# Patient Record
Sex: MALE | Race: WHITE | NOT HISPANIC OR LATINO | Employment: FULL TIME | URBAN - METROPOLITAN AREA
[De-identification: names, ages, dates, MRNs, and addresses within clinical notes are randomized per-mention and may not be internally consistent; named-entity substitution may affect disease eponyms.]

---

## 2017-01-17 ENCOUNTER — COMMUNICATION - HEALTHEAST (OUTPATIENT)
Dept: SCHEDULING | Facility: CLINIC | Age: 32
End: 2017-01-17

## 2017-02-16 ENCOUNTER — COMMUNICATION - HEALTHEAST (OUTPATIENT)
Dept: FAMILY MEDICINE | Facility: CLINIC | Age: 32
End: 2017-02-16

## 2017-02-22 ENCOUNTER — COMMUNICATION - HEALTHEAST (OUTPATIENT)
Dept: SCHEDULING | Facility: CLINIC | Age: 32
End: 2017-02-22

## 2017-03-27 ENCOUNTER — COMMUNICATION - HEALTHEAST (OUTPATIENT)
Dept: SCHEDULING | Facility: CLINIC | Age: 32
End: 2017-03-27

## 2017-04-04 ENCOUNTER — OFFICE VISIT - HEALTHEAST (OUTPATIENT)
Dept: FAMILY MEDICINE | Facility: CLINIC | Age: 32
End: 2017-04-04

## 2017-04-04 DIAGNOSIS — F90.9 ADHD (ATTENTION DEFICIT HYPERACTIVITY DISORDER): ICD-10-CM

## 2017-04-04 DIAGNOSIS — F41.9 ANXIETY: ICD-10-CM

## 2017-04-04 DIAGNOSIS — F90.9 ATTENTION DEFICIT HYPERACTIVITY DISORDER (ADHD), UNSPECIFIED ADHD TYPE: ICD-10-CM

## 2017-04-04 ASSESSMENT — MIFFLIN-ST. JEOR: SCORE: 1859.53

## 2017-07-07 ENCOUNTER — COMMUNICATION - HEALTHEAST (OUTPATIENT)
Dept: FAMILY MEDICINE | Facility: CLINIC | Age: 32
End: 2017-07-07

## 2017-07-07 DIAGNOSIS — F41.9 ANXIETY: ICD-10-CM

## 2017-10-05 ENCOUNTER — COMMUNICATION - HEALTHEAST (OUTPATIENT)
Dept: FAMILY MEDICINE | Facility: CLINIC | Age: 32
End: 2017-10-05

## 2017-10-05 DIAGNOSIS — F41.9 ANXIETY: ICD-10-CM

## 2017-10-26 ENCOUNTER — OFFICE VISIT - HEALTHEAST (OUTPATIENT)
Dept: FAMILY MEDICINE | Facility: CLINIC | Age: 32
End: 2017-10-26

## 2017-10-26 DIAGNOSIS — Z23 NEED FOR DIPHTHERIA-TETANUS-PERTUSSIS (TDAP) VACCINE, ADULT/ADOLESCENT: ICD-10-CM

## 2017-10-26 DIAGNOSIS — Z23 NEED FOR IMMUNIZATION AGAINST INFLUENZA: ICD-10-CM

## 2017-10-26 DIAGNOSIS — H53.9 VISUAL CHANGES: ICD-10-CM

## 2017-10-26 DIAGNOSIS — F90.9 ATTENTION DEFICIT HYPERACTIVITY DISORDER (ADHD), UNSPECIFIED ADHD TYPE: ICD-10-CM

## 2017-10-26 DIAGNOSIS — Z00.00 HEALTH CARE MAINTENANCE: ICD-10-CM

## 2017-10-26 DIAGNOSIS — F41.9 ANXIETY: ICD-10-CM

## 2017-10-26 LAB
CHOLEST SERPL-MCNC: 283 MG/DL
FASTING STATUS PATIENT QL REPORTED: NO
HDLC SERPL-MCNC: 60 MG/DL
LDLC SERPL CALC-MCNC: 206 MG/DL
TRIGL SERPL-MCNC: 86 MG/DL

## 2017-10-31 ENCOUNTER — COMMUNICATION - HEALTHEAST (OUTPATIENT)
Dept: FAMILY MEDICINE | Facility: CLINIC | Age: 32
End: 2017-10-31

## 2017-11-21 ENCOUNTER — COMMUNICATION - HEALTHEAST (OUTPATIENT)
Dept: FAMILY MEDICINE | Facility: CLINIC | Age: 32
End: 2017-11-21

## 2017-11-21 DIAGNOSIS — F90.9 ATTENTION DEFICIT HYPERACTIVITY DISORDER (ADHD), UNSPECIFIED ADHD TYPE: ICD-10-CM

## 2017-12-24 ENCOUNTER — COMMUNICATION - HEALTHEAST (OUTPATIENT)
Dept: SCHEDULING | Facility: CLINIC | Age: 32
End: 2017-12-24

## 2017-12-24 DIAGNOSIS — F90.9 ATTENTION DEFICIT HYPERACTIVITY DISORDER (ADHD), UNSPECIFIED ADHD TYPE: ICD-10-CM

## 2018-01-24 ENCOUNTER — COMMUNICATION - HEALTHEAST (OUTPATIENT)
Dept: FAMILY MEDICINE | Facility: CLINIC | Age: 33
End: 2018-01-24

## 2018-01-24 DIAGNOSIS — F90.9 ATTENTION DEFICIT HYPERACTIVITY DISORDER (ADHD), UNSPECIFIED ADHD TYPE: ICD-10-CM

## 2018-02-27 ENCOUNTER — COMMUNICATION - HEALTHEAST (OUTPATIENT)
Dept: SCHEDULING | Facility: CLINIC | Age: 33
End: 2018-02-27

## 2018-02-27 DIAGNOSIS — F90.9 ATTENTION DEFICIT HYPERACTIVITY DISORDER (ADHD), UNSPECIFIED ADHD TYPE: ICD-10-CM

## 2018-03-26 ENCOUNTER — COMMUNICATION - HEALTHEAST (OUTPATIENT)
Dept: FAMILY MEDICINE | Facility: CLINIC | Age: 33
End: 2018-03-26

## 2018-03-26 DIAGNOSIS — F90.9 ATTENTION DEFICIT HYPERACTIVITY DISORDER (ADHD), UNSPECIFIED ADHD TYPE: ICD-10-CM

## 2018-04-26 ENCOUNTER — COMMUNICATION - HEALTHEAST (OUTPATIENT)
Dept: FAMILY MEDICINE | Facility: CLINIC | Age: 33
End: 2018-04-26

## 2018-04-26 DIAGNOSIS — F90.9 ATTENTION DEFICIT HYPERACTIVITY DISORDER (ADHD), UNSPECIFIED ADHD TYPE: ICD-10-CM

## 2018-04-27 ENCOUNTER — OFFICE VISIT - HEALTHEAST (OUTPATIENT)
Dept: FAMILY MEDICINE | Facility: CLINIC | Age: 33
End: 2018-04-27

## 2018-04-27 DIAGNOSIS — F90.0 ATTENTION DEFICIT HYPERACTIVITY DISORDER (ADHD), PREDOMINANTLY INATTENTIVE TYPE: ICD-10-CM

## 2018-04-27 DIAGNOSIS — Z00.00 HEALTHCARE MAINTENANCE: ICD-10-CM

## 2018-04-27 DIAGNOSIS — E55.9 VITAMIN D DEFICIENCY: ICD-10-CM

## 2018-04-27 DIAGNOSIS — E78.01 FAMILIAL HYPERCHOLESTEROLEMIA: ICD-10-CM

## 2018-04-27 LAB
ALBUMIN SERPL-MCNC: 4.5 G/DL (ref 3.5–5)
ALP SERPL-CCNC: 69 U/L (ref 45–120)
ALT SERPL W P-5'-P-CCNC: 20 U/L (ref 0–45)
ANION GAP SERPL CALCULATED.3IONS-SCNC: 15 MMOL/L (ref 5–18)
AST SERPL W P-5'-P-CCNC: 20 U/L (ref 0–40)
BILIRUB SERPL-MCNC: 0.8 MG/DL (ref 0–1)
BUN SERPL-MCNC: 13 MG/DL (ref 8–22)
CALCIUM SERPL-MCNC: 9.8 MG/DL (ref 8.5–10.5)
CHLORIDE BLD-SCNC: 101 MMOL/L (ref 98–107)
CHOLEST SERPL-MCNC: 255 MG/DL
CO2 SERPL-SCNC: 24 MMOL/L (ref 22–31)
CREAT SERPL-MCNC: 1.05 MG/DL (ref 0.7–1.3)
FASTING STATUS PATIENT QL REPORTED: NO
GFR SERPL CREATININE-BSD FRML MDRD: >60 ML/MIN/1.73M2
GLUCOSE BLD-MCNC: 81 MG/DL (ref 70–125)
HDLC SERPL-MCNC: 54 MG/DL
LDLC SERPL CALC-MCNC: 182 MG/DL
POTASSIUM BLD-SCNC: 4.1 MMOL/L (ref 3.5–5)
PROT SERPL-MCNC: 7.8 G/DL (ref 6–8)
SODIUM SERPL-SCNC: 140 MMOL/L (ref 136–145)
TRIGL SERPL-MCNC: 94 MG/DL

## 2018-04-27 ASSESSMENT — MIFFLIN-ST. JEOR: SCORE: 1832.31

## 2018-04-30 ENCOUNTER — COMMUNICATION - HEALTHEAST (OUTPATIENT)
Dept: FAMILY MEDICINE | Facility: CLINIC | Age: 33
End: 2018-04-30

## 2018-04-30 LAB — 25(OH)D3 SERPL-MCNC: 36.4 NG/ML (ref 30–80)

## 2018-05-29 ENCOUNTER — COMMUNICATION - HEALTHEAST (OUTPATIENT)
Dept: FAMILY MEDICINE | Facility: CLINIC | Age: 33
End: 2018-05-29

## 2018-05-29 DIAGNOSIS — F90.9 ATTENTION DEFICIT HYPERACTIVITY DISORDER (ADHD), UNSPECIFIED ADHD TYPE: ICD-10-CM

## 2018-07-30 ENCOUNTER — COMMUNICATION - HEALTHEAST (OUTPATIENT)
Dept: SCHEDULING | Facility: CLINIC | Age: 33
End: 2018-07-30

## 2018-07-30 DIAGNOSIS — F90.9 ATTENTION DEFICIT HYPERACTIVITY DISORDER (ADHD), UNSPECIFIED ADHD TYPE: ICD-10-CM

## 2018-08-30 ENCOUNTER — COMMUNICATION - HEALTHEAST (OUTPATIENT)
Dept: FAMILY MEDICINE | Facility: CLINIC | Age: 33
End: 2018-08-30

## 2018-08-30 DIAGNOSIS — F90.9 ATTENTION DEFICIT HYPERACTIVITY DISORDER (ADHD), UNSPECIFIED ADHD TYPE: ICD-10-CM

## 2018-10-01 ENCOUNTER — COMMUNICATION - HEALTHEAST (OUTPATIENT)
Dept: FAMILY MEDICINE | Facility: CLINIC | Age: 33
End: 2018-10-01

## 2018-10-01 DIAGNOSIS — F90.9 ATTENTION DEFICIT HYPERACTIVITY DISORDER (ADHD), UNSPECIFIED ADHD TYPE: ICD-10-CM

## 2018-10-31 ENCOUNTER — COMMUNICATION - HEALTHEAST (OUTPATIENT)
Dept: FAMILY MEDICINE | Facility: CLINIC | Age: 33
End: 2018-10-31

## 2018-10-31 DIAGNOSIS — F90.9 ATTENTION DEFICIT HYPERACTIVITY DISORDER (ADHD), UNSPECIFIED ADHD TYPE: ICD-10-CM

## 2018-11-20 ENCOUNTER — OFFICE VISIT - HEALTHEAST (OUTPATIENT)
Dept: FAMILY MEDICINE | Facility: CLINIC | Age: 33
End: 2018-11-20

## 2018-11-20 ENCOUNTER — COMMUNICATION - HEALTHEAST (OUTPATIENT)
Dept: TELEHEALTH | Facility: CLINIC | Age: 33
End: 2018-11-20

## 2018-11-20 DIAGNOSIS — F90.0 ATTENTION DEFICIT HYPERACTIVITY DISORDER (ADHD), PREDOMINANTLY INATTENTIVE TYPE: ICD-10-CM

## 2018-11-20 DIAGNOSIS — F41.9 ANXIETY: ICD-10-CM

## 2018-11-20 ASSESSMENT — MIFFLIN-ST. JEOR: SCORE: 1809.63

## 2018-11-28 ENCOUNTER — COMMUNICATION - HEALTHEAST (OUTPATIENT)
Dept: FAMILY MEDICINE | Facility: CLINIC | Age: 33
End: 2018-11-28

## 2018-11-28 DIAGNOSIS — F90.9 ATTENTION DEFICIT HYPERACTIVITY DISORDER (ADHD), UNSPECIFIED ADHD TYPE: ICD-10-CM

## 2018-12-17 ENCOUNTER — COMMUNICATION - HEALTHEAST (OUTPATIENT)
Dept: FAMILY MEDICINE | Facility: CLINIC | Age: 33
End: 2018-12-17

## 2018-12-17 DIAGNOSIS — F41.9 ANXIETY: ICD-10-CM

## 2018-12-31 ENCOUNTER — COMMUNICATION - HEALTHEAST (OUTPATIENT)
Dept: FAMILY MEDICINE | Facility: CLINIC | Age: 33
End: 2018-12-31

## 2018-12-31 DIAGNOSIS — F90.9 ATTENTION DEFICIT HYPERACTIVITY DISORDER (ADHD), UNSPECIFIED ADHD TYPE: ICD-10-CM

## 2019-02-01 ENCOUNTER — COMMUNICATION - HEALTHEAST (OUTPATIENT)
Dept: FAMILY MEDICINE | Facility: CLINIC | Age: 34
End: 2019-02-01

## 2019-02-01 DIAGNOSIS — F90.9 ATTENTION DEFICIT HYPERACTIVITY DISORDER (ADHD), UNSPECIFIED ADHD TYPE: ICD-10-CM

## 2019-03-04 ENCOUNTER — COMMUNICATION - HEALTHEAST (OUTPATIENT)
Dept: FAMILY MEDICINE | Facility: CLINIC | Age: 34
End: 2019-03-04

## 2019-03-04 DIAGNOSIS — F90.9 ATTENTION DEFICIT HYPERACTIVITY DISORDER (ADHD), UNSPECIFIED ADHD TYPE: ICD-10-CM

## 2019-03-05 ENCOUNTER — COMMUNICATION - HEALTHEAST (OUTPATIENT)
Dept: FAMILY MEDICINE | Facility: CLINIC | Age: 34
End: 2019-03-05

## 2019-03-05 DIAGNOSIS — F90.9 ATTENTION DEFICIT HYPERACTIVITY DISORDER (ADHD), UNSPECIFIED ADHD TYPE: ICD-10-CM

## 2019-03-06 ENCOUNTER — COMMUNICATION - HEALTHEAST (OUTPATIENT)
Dept: FAMILY MEDICINE | Facility: CLINIC | Age: 34
End: 2019-03-06

## 2019-03-06 DIAGNOSIS — F90.9 ATTENTION DEFICIT HYPERACTIVITY DISORDER (ADHD), UNSPECIFIED ADHD TYPE: ICD-10-CM

## 2019-04-04 ENCOUNTER — COMMUNICATION - HEALTHEAST (OUTPATIENT)
Dept: FAMILY MEDICINE | Facility: CLINIC | Age: 34
End: 2019-04-04

## 2019-04-04 DIAGNOSIS — F90.9 ATTENTION DEFICIT HYPERACTIVITY DISORDER (ADHD), UNSPECIFIED ADHD TYPE: ICD-10-CM

## 2019-05-03 ENCOUNTER — COMMUNICATION - HEALTHEAST (OUTPATIENT)
Dept: FAMILY MEDICINE | Facility: CLINIC | Age: 34
End: 2019-05-03

## 2019-05-03 DIAGNOSIS — F90.9 ATTENTION DEFICIT HYPERACTIVITY DISORDER (ADHD), UNSPECIFIED ADHD TYPE: ICD-10-CM

## 2019-06-03 ENCOUNTER — COMMUNICATION - HEALTHEAST (OUTPATIENT)
Dept: FAMILY MEDICINE | Facility: CLINIC | Age: 34
End: 2019-06-03

## 2019-06-03 DIAGNOSIS — F90.9 ATTENTION DEFICIT HYPERACTIVITY DISORDER (ADHD), UNSPECIFIED ADHD TYPE: ICD-10-CM

## 2019-07-02 ENCOUNTER — COMMUNICATION - HEALTHEAST (OUTPATIENT)
Dept: FAMILY MEDICINE | Facility: CLINIC | Age: 34
End: 2019-07-02

## 2019-07-02 DIAGNOSIS — F90.9 ATTENTION DEFICIT HYPERACTIVITY DISORDER (ADHD), UNSPECIFIED ADHD TYPE: ICD-10-CM

## 2019-08-01 ENCOUNTER — COMMUNICATION - HEALTHEAST (OUTPATIENT)
Dept: FAMILY MEDICINE | Facility: CLINIC | Age: 34
End: 2019-08-01

## 2019-08-01 DIAGNOSIS — F90.9 ATTENTION DEFICIT HYPERACTIVITY DISORDER (ADHD), UNSPECIFIED ADHD TYPE: ICD-10-CM

## 2019-09-03 ENCOUNTER — COMMUNICATION - HEALTHEAST (OUTPATIENT)
Dept: FAMILY MEDICINE | Facility: CLINIC | Age: 34
End: 2019-09-03

## 2019-09-03 DIAGNOSIS — F90.9 ATTENTION DEFICIT HYPERACTIVITY DISORDER (ADHD), UNSPECIFIED ADHD TYPE: ICD-10-CM

## 2019-09-06 ENCOUNTER — RECORDS - HEALTHEAST (OUTPATIENT)
Dept: GENERAL RADIOLOGY | Facility: CLINIC | Age: 34
End: 2019-09-06

## 2019-09-06 ENCOUNTER — OFFICE VISIT - HEALTHEAST (OUTPATIENT)
Dept: FAMILY MEDICINE | Facility: CLINIC | Age: 34
End: 2019-09-06

## 2019-09-06 DIAGNOSIS — M54.50 CHRONIC MIDLINE LOW BACK PAIN WITHOUT SCIATICA: ICD-10-CM

## 2019-09-06 DIAGNOSIS — Z98.890 OTHER SPECIFIED POSTPROCEDURAL STATES: ICD-10-CM

## 2019-09-06 DIAGNOSIS — F90.0 ATTENTION DEFICIT HYPERACTIVITY DISORDER (ADHD), PREDOMINANTLY INATTENTIVE TYPE: ICD-10-CM

## 2019-09-06 DIAGNOSIS — G89.29 OTHER CHRONIC PAIN: ICD-10-CM

## 2019-09-06 DIAGNOSIS — M62.830 BACK MUSCLE SPASM: ICD-10-CM

## 2019-09-06 DIAGNOSIS — Z11.4 SCREENING FOR HIV (HUMAN IMMUNODEFICIENCY VIRUS): ICD-10-CM

## 2019-09-06 DIAGNOSIS — Z00.00 HEALTHCARE MAINTENANCE: ICD-10-CM

## 2019-09-06 DIAGNOSIS — E78.01 FAMILIAL HYPERCHOLESTEROLEMIA: ICD-10-CM

## 2019-09-06 DIAGNOSIS — F41.9 ANXIETY: ICD-10-CM

## 2019-09-06 DIAGNOSIS — G89.29 CHRONIC MIDLINE LOW BACK PAIN WITHOUT SCIATICA: ICD-10-CM

## 2019-09-06 DIAGNOSIS — M54.50 LOW BACK PAIN: ICD-10-CM

## 2019-09-06 DIAGNOSIS — Z98.890 HISTORY OF BACK SURGERY: ICD-10-CM

## 2019-09-06 LAB — HIV 1+2 AB+HIV1 P24 AG SERPL QL IA: NEGATIVE

## 2019-09-06 ASSESSMENT — MIFFLIN-ST. JEOR: SCORE: 1849.32

## 2019-09-07 LAB
ALBUMIN SERPL-MCNC: 4.6 G/DL (ref 3.5–5)
ALP SERPL-CCNC: 59 U/L (ref 45–120)
ALT SERPL W P-5'-P-CCNC: 22 U/L (ref 0–45)
ANION GAP SERPL CALCULATED.3IONS-SCNC: 13 MMOL/L (ref 5–18)
AST SERPL W P-5'-P-CCNC: 21 U/L (ref 0–40)
BILIRUB SERPL-MCNC: 0.6 MG/DL (ref 0–1)
BUN SERPL-MCNC: 16 MG/DL (ref 8–22)
CALCIUM SERPL-MCNC: 10.1 MG/DL (ref 8.5–10.5)
CHLORIDE BLD-SCNC: 100 MMOL/L (ref 98–107)
CHOLEST SERPL-MCNC: 264 MG/DL
CO2 SERPL-SCNC: 26 MMOL/L (ref 22–31)
CREAT SERPL-MCNC: 1.1 MG/DL (ref 0.7–1.3)
FASTING STATUS PATIENT QL REPORTED: NO
GFR SERPL CREATININE-BSD FRML MDRD: >60 ML/MIN/1.73M2
GLUCOSE BLD-MCNC: 80 MG/DL (ref 70–125)
HDLC SERPL-MCNC: 65 MG/DL
LDLC SERPL CALC-MCNC: 177 MG/DL
POTASSIUM BLD-SCNC: 4.1 MMOL/L (ref 3.5–5)
PROT SERPL-MCNC: 7.7 G/DL (ref 6–8)
SODIUM SERPL-SCNC: 139 MMOL/L (ref 136–145)
TRIGL SERPL-MCNC: 112 MG/DL

## 2019-09-17 ENCOUNTER — HOSPITAL ENCOUNTER (OUTPATIENT)
Dept: MRI IMAGING | Facility: CLINIC | Age: 34
Discharge: HOME | End: 2019-09-17
Attending: FAMILY MEDICINE

## 2019-09-17 DIAGNOSIS — M54.50 CHRONIC MIDLINE LOW BACK PAIN WITHOUT SCIATICA: ICD-10-CM

## 2019-09-17 DIAGNOSIS — M62.830 BACK MUSCLE SPASM: ICD-10-CM

## 2019-09-17 DIAGNOSIS — Z98.890 HISTORY OF BACK SURGERY: ICD-10-CM

## 2019-09-17 DIAGNOSIS — G89.29 CHRONIC MIDLINE LOW BACK PAIN WITHOUT SCIATICA: ICD-10-CM

## 2019-09-19 ENCOUNTER — OFFICE VISIT - HEALTHEAST (OUTPATIENT)
Dept: PHYSICAL MEDICINE AND REHAB | Facility: REHABILITATION | Age: 34
End: 2019-09-19

## 2019-09-19 ENCOUNTER — COMMUNICATION - HEALTHEAST (OUTPATIENT)
Dept: FAMILY MEDICINE | Facility: CLINIC | Age: 34
End: 2019-09-19

## 2019-09-19 DIAGNOSIS — M48.061 STENOSIS OF LATERAL RECESS OF LUMBAR SPINE: ICD-10-CM

## 2019-09-19 DIAGNOSIS — M54.50 CHRONIC LEFT-SIDED LOW BACK PAIN WITHOUT SCIATICA: ICD-10-CM

## 2019-09-19 DIAGNOSIS — M51.26 PROTRUSION OF LUMBAR INTERVERTEBRAL DISC: ICD-10-CM

## 2019-09-19 DIAGNOSIS — M54.50 ACUTE LEFT-SIDED LOW BACK PAIN WITHOUT SCIATICA: ICD-10-CM

## 2019-09-19 DIAGNOSIS — G89.29 CHRONIC LEFT-SIDED LOW BACK PAIN WITHOUT SCIATICA: ICD-10-CM

## 2019-10-01 ENCOUNTER — COMMUNICATION - HEALTHEAST (OUTPATIENT)
Dept: FAMILY MEDICINE | Facility: CLINIC | Age: 34
End: 2019-10-01

## 2019-10-01 DIAGNOSIS — F90.9 ATTENTION DEFICIT HYPERACTIVITY DISORDER (ADHD), UNSPECIFIED ADHD TYPE: ICD-10-CM

## 2019-10-30 ENCOUNTER — COMMUNICATION - HEALTHEAST (OUTPATIENT)
Dept: FAMILY MEDICINE | Facility: CLINIC | Age: 34
End: 2019-10-30

## 2019-10-30 DIAGNOSIS — F90.9 ATTENTION DEFICIT HYPERACTIVITY DISORDER (ADHD), UNSPECIFIED ADHD TYPE: ICD-10-CM

## 2019-12-02 ENCOUNTER — COMMUNICATION - HEALTHEAST (OUTPATIENT)
Dept: FAMILY MEDICINE | Facility: CLINIC | Age: 34
End: 2019-12-02

## 2019-12-02 DIAGNOSIS — F90.9 ATTENTION DEFICIT HYPERACTIVITY DISORDER (ADHD), UNSPECIFIED ADHD TYPE: ICD-10-CM

## 2019-12-29 ENCOUNTER — COMMUNICATION - HEALTHEAST (OUTPATIENT)
Dept: FAMILY MEDICINE | Facility: CLINIC | Age: 34
End: 2019-12-29

## 2019-12-29 DIAGNOSIS — F41.9 ANXIETY: ICD-10-CM

## 2019-12-30 ENCOUNTER — COMMUNICATION - HEALTHEAST (OUTPATIENT)
Dept: FAMILY MEDICINE | Facility: CLINIC | Age: 34
End: 2019-12-30

## 2019-12-30 DIAGNOSIS — F90.9 ATTENTION DEFICIT HYPERACTIVITY DISORDER (ADHD), UNSPECIFIED ADHD TYPE: ICD-10-CM

## 2020-01-27 ENCOUNTER — COMMUNICATION - HEALTHEAST (OUTPATIENT)
Dept: FAMILY MEDICINE | Facility: CLINIC | Age: 35
End: 2020-01-27

## 2020-01-27 DIAGNOSIS — F90.9 ATTENTION DEFICIT HYPERACTIVITY DISORDER (ADHD), UNSPECIFIED ADHD TYPE: ICD-10-CM

## 2020-02-26 ENCOUNTER — COMMUNICATION - HEALTHEAST (OUTPATIENT)
Dept: FAMILY MEDICINE | Facility: CLINIC | Age: 35
End: 2020-02-26

## 2020-02-26 DIAGNOSIS — F90.9 ATTENTION DEFICIT HYPERACTIVITY DISORDER (ADHD), UNSPECIFIED ADHD TYPE: ICD-10-CM

## 2020-04-13 ENCOUNTER — COMMUNICATION - HEALTHEAST (OUTPATIENT)
Dept: FAMILY MEDICINE | Facility: CLINIC | Age: 35
End: 2020-04-13

## 2020-04-13 DIAGNOSIS — F90.9 ATTENTION DEFICIT HYPERACTIVITY DISORDER (ADHD), UNSPECIFIED ADHD TYPE: ICD-10-CM

## 2020-05-22 ENCOUNTER — COMMUNICATION - HEALTHEAST (OUTPATIENT)
Dept: FAMILY MEDICINE | Facility: CLINIC | Age: 35
End: 2020-05-22

## 2020-05-22 DIAGNOSIS — F90.9 ATTENTION DEFICIT HYPERACTIVITY DISORDER (ADHD), UNSPECIFIED ADHD TYPE: ICD-10-CM

## 2020-06-24 ENCOUNTER — COMMUNICATION - HEALTHEAST (OUTPATIENT)
Dept: FAMILY MEDICINE | Facility: CLINIC | Age: 35
End: 2020-06-24

## 2020-06-24 DIAGNOSIS — F90.9 ATTENTION DEFICIT HYPERACTIVITY DISORDER (ADHD), UNSPECIFIED ADHD TYPE: ICD-10-CM

## 2020-07-30 ENCOUNTER — COMMUNICATION - HEALTHEAST (OUTPATIENT)
Dept: FAMILY MEDICINE | Facility: CLINIC | Age: 35
End: 2020-07-30

## 2020-07-30 DIAGNOSIS — F90.9 ATTENTION DEFICIT HYPERACTIVITY DISORDER (ADHD), UNSPECIFIED ADHD TYPE: ICD-10-CM

## 2020-08-27 ENCOUNTER — COMMUNICATION - HEALTHEAST (OUTPATIENT)
Dept: FAMILY MEDICINE | Facility: CLINIC | Age: 35
End: 2020-08-27

## 2020-08-27 DIAGNOSIS — F90.9 ATTENTION DEFICIT HYPERACTIVITY DISORDER (ADHD), UNSPECIFIED ADHD TYPE: ICD-10-CM

## 2020-08-31 ENCOUNTER — COMMUNICATION - HEALTHEAST (OUTPATIENT)
Dept: FAMILY MEDICINE | Facility: CLINIC | Age: 35
End: 2020-08-31

## 2020-08-31 DIAGNOSIS — F90.9 ATTENTION DEFICIT HYPERACTIVITY DISORDER (ADHD), UNSPECIFIED ADHD TYPE: ICD-10-CM

## 2020-09-16 ENCOUNTER — COMMUNICATION - HEALTHEAST (OUTPATIENT)
Dept: FAMILY MEDICINE | Facility: CLINIC | Age: 35
End: 2020-09-16

## 2020-09-16 DIAGNOSIS — M62.830 BACK MUSCLE SPASM: ICD-10-CM

## 2020-09-17 ENCOUNTER — COMMUNICATION - HEALTHEAST (OUTPATIENT)
Dept: FAMILY MEDICINE | Facility: CLINIC | Age: 35
End: 2020-09-17

## 2020-09-17 DIAGNOSIS — Z98.890 HISTORY OF BACK SURGERY: ICD-10-CM

## 2020-09-17 DIAGNOSIS — M54.50 CHRONIC MIDLINE LOW BACK PAIN WITHOUT SCIATICA: ICD-10-CM

## 2020-09-17 DIAGNOSIS — G89.29 CHRONIC MIDLINE LOW BACK PAIN WITHOUT SCIATICA: ICD-10-CM

## 2020-09-17 DIAGNOSIS — M62.830 BACK MUSCLE SPASM: ICD-10-CM

## 2020-09-17 RX ORDER — CYCLOBENZAPRINE HCL 10 MG
10 TABLET ORAL 3 TIMES DAILY PRN
Qty: 30 TABLET | Refills: 1 | Status: SHIPPED | OUTPATIENT
Start: 2020-09-17 | End: 2022-08-18

## 2020-09-23 ENCOUNTER — COMMUNICATION - HEALTHEAST (OUTPATIENT)
Dept: FAMILY MEDICINE | Facility: CLINIC | Age: 35
End: 2020-09-23

## 2020-09-23 DIAGNOSIS — F41.9 ANXIETY: ICD-10-CM

## 2020-10-07 ENCOUNTER — HOSPITAL ENCOUNTER (OUTPATIENT)
Dept: PHYSICAL MEDICINE AND REHAB | Facility: CLINIC | Age: 35
Discharge: HOME | End: 2020-10-07
Attending: NURSE PRACTITIONER

## 2020-10-07 ENCOUNTER — RECORDS - HEALTHEAST (OUTPATIENT)
Dept: ADMINISTRATIVE | Facility: OTHER | Age: 35
End: 2020-10-07

## 2020-10-07 DIAGNOSIS — M54.50 CHRONIC LEFT-SIDED LOW BACK PAIN WITHOUT SCIATICA: ICD-10-CM

## 2020-10-07 DIAGNOSIS — M51.26 PROTRUSION OF LUMBAR INTERVERTEBRAL DISC: ICD-10-CM

## 2020-10-07 DIAGNOSIS — M48.061 STENOSIS OF LATERAL RECESS OF LUMBAR SPINE: ICD-10-CM

## 2020-10-07 DIAGNOSIS — G89.29 CHRONIC LEFT-SIDED LOW BACK PAIN WITHOUT SCIATICA: ICD-10-CM

## 2020-10-08 ENCOUNTER — AMBULATORY - HEALTHEAST (OUTPATIENT)
Dept: PHYSICAL MEDICINE AND REHAB | Facility: CLINIC | Age: 35
End: 2020-10-08

## 2020-10-08 ENCOUNTER — COMMUNICATION - HEALTHEAST (OUTPATIENT)
Dept: FAMILY MEDICINE | Facility: CLINIC | Age: 35
End: 2020-10-08

## 2020-10-08 DIAGNOSIS — G89.29 CHRONIC LEFT-SIDED LOW BACK PAIN WITHOUT SCIATICA: ICD-10-CM

## 2020-10-08 DIAGNOSIS — F90.9 ATTENTION DEFICIT HYPERACTIVITY DISORDER (ADHD), UNSPECIFIED ADHD TYPE: ICD-10-CM

## 2020-10-08 DIAGNOSIS — M54.50 ACUTE LEFT-SIDED LOW BACK PAIN WITHOUT SCIATICA: ICD-10-CM

## 2020-10-08 DIAGNOSIS — M54.50 CHRONIC LEFT-SIDED LOW BACK PAIN WITHOUT SCIATICA: ICD-10-CM

## 2020-10-08 DIAGNOSIS — M51.26 PROTRUSION OF LUMBAR INTERVERTEBRAL DISC: ICD-10-CM

## 2020-10-08 DIAGNOSIS — M48.061 STENOSIS OF LATERAL RECESS OF LUMBAR SPINE: ICD-10-CM

## 2020-10-09 ENCOUNTER — COMMUNICATION - HEALTHEAST (OUTPATIENT)
Dept: PHYSICAL MEDICINE AND REHAB | Facility: CLINIC | Age: 35
End: 2020-10-09

## 2020-10-16 ENCOUNTER — HOSPITAL ENCOUNTER (OUTPATIENT)
Dept: PHYSICAL MEDICINE AND REHAB | Facility: CLINIC | Age: 35
Discharge: HOME | End: 2020-10-16
Attending: PAIN MEDICINE

## 2020-10-16 DIAGNOSIS — M51.26 PROTRUSION OF LUMBAR INTERVERTEBRAL DISC: ICD-10-CM

## 2020-10-16 DIAGNOSIS — M54.50 CHRONIC LEFT-SIDED LOW BACK PAIN WITHOUT SCIATICA: ICD-10-CM

## 2020-10-16 DIAGNOSIS — M48.061 STENOSIS OF LATERAL RECESS OF LUMBAR SPINE: ICD-10-CM

## 2020-10-16 DIAGNOSIS — M54.50 ACUTE LEFT-SIDED LOW BACK PAIN WITHOUT SCIATICA: ICD-10-CM

## 2020-10-16 DIAGNOSIS — G89.29 CHRONIC LEFT-SIDED LOW BACK PAIN WITHOUT SCIATICA: ICD-10-CM

## 2020-10-20 ENCOUNTER — RECORDS - HEALTHEAST (OUTPATIENT)
Dept: RADIOLOGY | Facility: CLINIC | Age: 35
End: 2020-10-20

## 2020-10-20 ENCOUNTER — OFFICE VISIT - HEALTHEAST (OUTPATIENT)
Dept: FAMILY MEDICINE | Facility: CLINIC | Age: 35
End: 2020-10-20

## 2020-10-20 DIAGNOSIS — F41.9 ANXIETY: ICD-10-CM

## 2020-10-20 DIAGNOSIS — Z00.00 HEALTHCARE MAINTENANCE: ICD-10-CM

## 2020-10-20 DIAGNOSIS — M54.50 CHRONIC MIDLINE LOW BACK PAIN, UNSPECIFIED WHETHER SCIATICA PRESENT: ICD-10-CM

## 2020-10-20 DIAGNOSIS — G89.29 CHRONIC MIDLINE LOW BACK PAIN, UNSPECIFIED WHETHER SCIATICA PRESENT: ICD-10-CM

## 2020-10-20 LAB
ALBUMIN SERPL-MCNC: 4.3 G/DL (ref 3.5–5)
ALP SERPL-CCNC: 59 U/L (ref 45–120)
ALT SERPL W P-5'-P-CCNC: 28 U/L (ref 0–45)
ANION GAP SERPL CALCULATED.3IONS-SCNC: 8 MMOL/L (ref 5–18)
AST SERPL W P-5'-P-CCNC: 20 U/L (ref 0–40)
BILIRUB SERPL-MCNC: 0.6 MG/DL (ref 0–1)
BUN SERPL-MCNC: 16 MG/DL (ref 8–22)
CALCIUM SERPL-MCNC: 9.7 MG/DL (ref 8.5–10.5)
CHLORIDE BLD-SCNC: 100 MMOL/L (ref 98–107)
CHOLEST SERPL-MCNC: 257 MG/DL
CO2 SERPL-SCNC: 30 MMOL/L (ref 22–31)
CREAT SERPL-MCNC: 1 MG/DL (ref 0.7–1.3)
FASTING STATUS PATIENT QL REPORTED: YES
GFR SERPL CREATININE-BSD FRML MDRD: >60 ML/MIN/1.73M2
GLUCOSE BLD-MCNC: 98 MG/DL (ref 70–125)
HDLC SERPL-MCNC: 56 MG/DL
LDLC SERPL CALC-MCNC: 166 MG/DL
POTASSIUM BLD-SCNC: 5.1 MMOL/L (ref 3.5–5)
PROT SERPL-MCNC: 7.2 G/DL (ref 6–8)
SODIUM SERPL-SCNC: 138 MMOL/L (ref 136–145)
TRIGL SERPL-MCNC: 173 MG/DL

## 2020-10-20 RX ORDER — BUPROPION HYDROCHLORIDE 150 MG/1
150 TABLET ORAL DAILY
Qty: 90 TABLET | Refills: 3 | Status: SHIPPED | OUTPATIENT
Start: 2020-10-20 | End: 2021-10-12

## 2020-10-20 ASSESSMENT — MIFFLIN-ST. JEOR: SCORE: 1858.39

## 2020-11-05 ENCOUNTER — HOSPITAL ENCOUNTER (OUTPATIENT)
Dept: PHYSICAL MEDICINE AND REHAB | Facility: CLINIC | Age: 35
Discharge: HOME | End: 2020-11-05
Attending: NURSE PRACTITIONER

## 2020-11-05 DIAGNOSIS — G89.29 CHRONIC LEFT-SIDED LOW BACK PAIN WITHOUT SCIATICA: ICD-10-CM

## 2020-11-05 DIAGNOSIS — M51.26 PROTRUSION OF LUMBAR INTERVERTEBRAL DISC: ICD-10-CM

## 2020-11-05 DIAGNOSIS — M54.50 CHRONIC LEFT-SIDED LOW BACK PAIN WITHOUT SCIATICA: ICD-10-CM

## 2020-11-05 DIAGNOSIS — M51.369 DDD (DEGENERATIVE DISC DISEASE), LUMBAR: ICD-10-CM

## 2020-11-05 DIAGNOSIS — M48.061 STENOSIS OF LATERAL RECESS OF LUMBAR SPINE: ICD-10-CM

## 2020-11-11 ENCOUNTER — COMMUNICATION - HEALTHEAST (OUTPATIENT)
Dept: FAMILY MEDICINE | Facility: CLINIC | Age: 35
End: 2020-11-11

## 2020-11-11 DIAGNOSIS — F90.9 ATTENTION DEFICIT HYPERACTIVITY DISORDER (ADHD), UNSPECIFIED ADHD TYPE: ICD-10-CM

## 2020-11-12 ENCOUNTER — COMMUNICATION - HEALTHEAST (OUTPATIENT)
Dept: FAMILY MEDICINE | Facility: CLINIC | Age: 35
End: 2020-11-12

## 2020-11-12 DIAGNOSIS — F90.9 ATTENTION DEFICIT HYPERACTIVITY DISORDER (ADHD), UNSPECIFIED ADHD TYPE: ICD-10-CM

## 2020-11-13 ENCOUNTER — OFFICE VISIT - HEALTHEAST (OUTPATIENT)
Dept: PHYSICAL THERAPY | Facility: CLINIC | Age: 35
End: 2020-11-13

## 2020-11-13 DIAGNOSIS — M54.50 CHRONIC LEFT-SIDED LOW BACK PAIN WITHOUT SCIATICA: ICD-10-CM

## 2020-11-13 DIAGNOSIS — M51.27 PROTRUSION OF INTERVERTEBRAL DISC OF LUMBOSACRAL REGION: ICD-10-CM

## 2020-11-13 DIAGNOSIS — M62.81 GENERALIZED MUSCLE WEAKNESS: ICD-10-CM

## 2020-11-13 DIAGNOSIS — G89.29 CHRONIC LEFT-SIDED LOW BACK PAIN WITHOUT SCIATICA: ICD-10-CM

## 2020-11-17 ENCOUNTER — OFFICE VISIT - HEALTHEAST (OUTPATIENT)
Dept: PHYSICAL THERAPY | Facility: CLINIC | Age: 35
End: 2020-11-17

## 2020-11-17 DIAGNOSIS — M54.50 CHRONIC LEFT-SIDED LOW BACK PAIN WITHOUT SCIATICA: ICD-10-CM

## 2020-11-17 DIAGNOSIS — M51.27 PROTRUSION OF INTERVERTEBRAL DISC OF LUMBOSACRAL REGION: ICD-10-CM

## 2020-11-17 DIAGNOSIS — G89.29 CHRONIC LEFT-SIDED LOW BACK PAIN WITHOUT SCIATICA: ICD-10-CM

## 2020-11-17 DIAGNOSIS — M62.81 GENERALIZED MUSCLE WEAKNESS: ICD-10-CM

## 2020-11-20 ENCOUNTER — OFFICE VISIT - HEALTHEAST (OUTPATIENT)
Dept: PHYSICAL THERAPY | Facility: CLINIC | Age: 35
End: 2020-11-20

## 2020-11-20 DIAGNOSIS — G89.29 CHRONIC LEFT-SIDED LOW BACK PAIN WITHOUT SCIATICA: ICD-10-CM

## 2020-11-20 DIAGNOSIS — M62.81 GENERALIZED MUSCLE WEAKNESS: ICD-10-CM

## 2020-11-20 DIAGNOSIS — M54.50 CHRONIC LEFT-SIDED LOW BACK PAIN WITHOUT SCIATICA: ICD-10-CM

## 2020-11-20 DIAGNOSIS — M51.27 PROTRUSION OF INTERVERTEBRAL DISC OF LUMBOSACRAL REGION: ICD-10-CM

## 2020-11-27 ENCOUNTER — OFFICE VISIT - HEALTHEAST (OUTPATIENT)
Dept: PHYSICAL THERAPY | Facility: CLINIC | Age: 35
End: 2020-11-27

## 2020-11-27 DIAGNOSIS — G89.29 CHRONIC LEFT-SIDED LOW BACK PAIN WITHOUT SCIATICA: ICD-10-CM

## 2020-11-27 DIAGNOSIS — M54.50 CHRONIC LEFT-SIDED LOW BACK PAIN WITHOUT SCIATICA: ICD-10-CM

## 2020-11-27 DIAGNOSIS — M51.27 PROTRUSION OF INTERVERTEBRAL DISC OF LUMBOSACRAL REGION: ICD-10-CM

## 2020-11-27 DIAGNOSIS — M62.81 GENERALIZED MUSCLE WEAKNESS: ICD-10-CM

## 2020-12-01 ENCOUNTER — OFFICE VISIT - HEALTHEAST (OUTPATIENT)
Dept: PHYSICAL THERAPY | Facility: CLINIC | Age: 35
End: 2020-12-01

## 2020-12-01 DIAGNOSIS — M54.50 CHRONIC LEFT-SIDED LOW BACK PAIN WITHOUT SCIATICA: ICD-10-CM

## 2020-12-01 DIAGNOSIS — M51.27 PROTRUSION OF INTERVERTEBRAL DISC OF LUMBOSACRAL REGION: ICD-10-CM

## 2020-12-01 DIAGNOSIS — G89.29 CHRONIC LEFT-SIDED LOW BACK PAIN WITHOUT SCIATICA: ICD-10-CM

## 2020-12-01 DIAGNOSIS — M62.81 GENERALIZED MUSCLE WEAKNESS: ICD-10-CM

## 2020-12-04 ENCOUNTER — OFFICE VISIT - HEALTHEAST (OUTPATIENT)
Dept: PHYSICAL THERAPY | Facility: CLINIC | Age: 35
End: 2020-12-04

## 2020-12-04 DIAGNOSIS — M54.50 CHRONIC LEFT-SIDED LOW BACK PAIN WITHOUT SCIATICA: ICD-10-CM

## 2020-12-04 DIAGNOSIS — G89.29 CHRONIC LEFT-SIDED LOW BACK PAIN WITHOUT SCIATICA: ICD-10-CM

## 2020-12-04 DIAGNOSIS — M62.81 GENERALIZED MUSCLE WEAKNESS: ICD-10-CM

## 2020-12-04 DIAGNOSIS — M51.27 PROTRUSION OF INTERVERTEBRAL DISC OF LUMBOSACRAL REGION: ICD-10-CM

## 2020-12-08 ENCOUNTER — OFFICE VISIT - HEALTHEAST (OUTPATIENT)
Dept: PHYSICAL THERAPY | Facility: CLINIC | Age: 35
End: 2020-12-08

## 2020-12-08 DIAGNOSIS — G89.29 CHRONIC LEFT-SIDED LOW BACK PAIN WITHOUT SCIATICA: ICD-10-CM

## 2020-12-08 DIAGNOSIS — M54.50 CHRONIC LEFT-SIDED LOW BACK PAIN WITHOUT SCIATICA: ICD-10-CM

## 2020-12-08 DIAGNOSIS — M51.27 PROTRUSION OF INTERVERTEBRAL DISC OF LUMBOSACRAL REGION: ICD-10-CM

## 2020-12-08 DIAGNOSIS — M62.81 GENERALIZED MUSCLE WEAKNESS: ICD-10-CM

## 2020-12-09 ENCOUNTER — HOSPITAL ENCOUNTER (OUTPATIENT)
Dept: PHYSICAL MEDICINE AND REHAB | Facility: CLINIC | Age: 35
Discharge: HOME | End: 2020-12-09
Attending: NURSE PRACTITIONER

## 2020-12-09 DIAGNOSIS — M51.26 PROTRUSION OF LUMBAR INTERVERTEBRAL DISC: ICD-10-CM

## 2020-12-09 DIAGNOSIS — G89.29 CHRONIC LEFT-SIDED LOW BACK PAIN WITHOUT SCIATICA: ICD-10-CM

## 2020-12-09 DIAGNOSIS — M54.50 CHRONIC LEFT-SIDED LOW BACK PAIN WITHOUT SCIATICA: ICD-10-CM

## 2020-12-09 DIAGNOSIS — M48.061 STENOSIS OF LATERAL RECESS OF LUMBAR SPINE: ICD-10-CM

## 2020-12-09 DIAGNOSIS — M51.369 DDD (DEGENERATIVE DISC DISEASE), LUMBAR: ICD-10-CM

## 2020-12-11 ENCOUNTER — COMMUNICATION - HEALTHEAST (OUTPATIENT)
Dept: FAMILY MEDICINE | Facility: CLINIC | Age: 35
End: 2020-12-11

## 2020-12-11 DIAGNOSIS — F90.9 ATTENTION DEFICIT HYPERACTIVITY DISORDER (ADHD), UNSPECIFIED ADHD TYPE: ICD-10-CM

## 2020-12-18 ENCOUNTER — OFFICE VISIT - HEALTHEAST (OUTPATIENT)
Dept: PHYSICAL THERAPY | Facility: CLINIC | Age: 35
End: 2020-12-18

## 2020-12-18 DIAGNOSIS — G89.29 CHRONIC LEFT-SIDED LOW BACK PAIN WITHOUT SCIATICA: ICD-10-CM

## 2020-12-18 DIAGNOSIS — M51.27 PROTRUSION OF INTERVERTEBRAL DISC OF LUMBOSACRAL REGION: ICD-10-CM

## 2020-12-18 DIAGNOSIS — M62.81 GENERALIZED MUSCLE WEAKNESS: ICD-10-CM

## 2020-12-18 DIAGNOSIS — M54.50 CHRONIC LEFT-SIDED LOW BACK PAIN WITHOUT SCIATICA: ICD-10-CM

## 2020-12-22 ENCOUNTER — HOSPITAL ENCOUNTER (OUTPATIENT)
Dept: PHYSICAL MEDICINE AND REHAB | Facility: CLINIC | Age: 35
Discharge: HOME | End: 2020-12-22
Attending: PAIN MEDICINE

## 2020-12-22 DIAGNOSIS — M54.50 CHRONIC LEFT-SIDED LOW BACK PAIN WITHOUT SCIATICA: ICD-10-CM

## 2020-12-22 DIAGNOSIS — M51.369 DDD (DEGENERATIVE DISC DISEASE), LUMBAR: ICD-10-CM

## 2020-12-22 DIAGNOSIS — G89.29 CHRONIC LEFT-SIDED LOW BACK PAIN WITHOUT SCIATICA: ICD-10-CM

## 2020-12-22 DIAGNOSIS — M51.26 PROTRUSION OF LUMBAR INTERVERTEBRAL DISC: ICD-10-CM

## 2020-12-22 DIAGNOSIS — M48.061 STENOSIS OF LATERAL RECESS OF LUMBAR SPINE: ICD-10-CM

## 2021-01-05 ENCOUNTER — OFFICE VISIT - HEALTHEAST (OUTPATIENT)
Dept: PHYSICAL THERAPY | Facility: CLINIC | Age: 36
End: 2021-01-05

## 2021-01-05 DIAGNOSIS — M51.27 PROTRUSION OF INTERVERTEBRAL DISC OF LUMBOSACRAL REGION: ICD-10-CM

## 2021-01-05 DIAGNOSIS — M62.81 GENERALIZED MUSCLE WEAKNESS: ICD-10-CM

## 2021-01-05 DIAGNOSIS — M54.50 CHRONIC LEFT-SIDED LOW BACK PAIN WITHOUT SCIATICA: ICD-10-CM

## 2021-01-05 DIAGNOSIS — G89.29 CHRONIC LEFT-SIDED LOW BACK PAIN WITHOUT SCIATICA: ICD-10-CM

## 2021-01-06 ENCOUNTER — HOSPITAL ENCOUNTER (OUTPATIENT)
Dept: PHYSICAL MEDICINE AND REHAB | Facility: CLINIC | Age: 36
Discharge: HOME | End: 2021-01-06
Attending: NURSE PRACTITIONER

## 2021-01-06 DIAGNOSIS — M51.26 PROTRUSION OF LUMBAR INTERVERTEBRAL DISC: ICD-10-CM

## 2021-01-06 DIAGNOSIS — M48.061 STENOSIS OF LATERAL RECESS OF LUMBAR SPINE: ICD-10-CM

## 2021-01-06 DIAGNOSIS — M54.50 CHRONIC LEFT-SIDED LOW BACK PAIN WITHOUT SCIATICA: ICD-10-CM

## 2021-01-06 DIAGNOSIS — M51.369 DDD (DEGENERATIVE DISC DISEASE), LUMBAR: ICD-10-CM

## 2021-01-06 DIAGNOSIS — G89.29 CHRONIC LEFT-SIDED LOW BACK PAIN WITHOUT SCIATICA: ICD-10-CM

## 2021-01-08 ENCOUNTER — OFFICE VISIT - HEALTHEAST (OUTPATIENT)
Dept: PHYSICAL THERAPY | Facility: CLINIC | Age: 36
End: 2021-01-08

## 2021-01-08 DIAGNOSIS — M54.50 CHRONIC LEFT-SIDED LOW BACK PAIN WITHOUT SCIATICA: ICD-10-CM

## 2021-01-08 DIAGNOSIS — M51.27 PROTRUSION OF INTERVERTEBRAL DISC OF LUMBOSACRAL REGION: ICD-10-CM

## 2021-01-08 DIAGNOSIS — M62.81 GENERALIZED MUSCLE WEAKNESS: ICD-10-CM

## 2021-01-08 DIAGNOSIS — G89.29 CHRONIC LEFT-SIDED LOW BACK PAIN WITHOUT SCIATICA: ICD-10-CM

## 2021-01-12 ENCOUNTER — OFFICE VISIT - HEALTHEAST (OUTPATIENT)
Dept: PHYSICAL THERAPY | Facility: CLINIC | Age: 36
End: 2021-01-12

## 2021-01-12 DIAGNOSIS — M54.50 CHRONIC LEFT-SIDED LOW BACK PAIN WITHOUT SCIATICA: ICD-10-CM

## 2021-01-12 DIAGNOSIS — M62.81 GENERALIZED MUSCLE WEAKNESS: ICD-10-CM

## 2021-01-12 DIAGNOSIS — G89.29 CHRONIC LEFT-SIDED LOW BACK PAIN WITHOUT SCIATICA: ICD-10-CM

## 2021-01-12 DIAGNOSIS — M51.27 PROTRUSION OF INTERVERTEBRAL DISC OF LUMBOSACRAL REGION: ICD-10-CM

## 2021-01-13 ENCOUNTER — COMMUNICATION - HEALTHEAST (OUTPATIENT)
Dept: FAMILY MEDICINE | Facility: CLINIC | Age: 36
End: 2021-01-13

## 2021-01-13 DIAGNOSIS — F90.9 ATTENTION DEFICIT HYPERACTIVITY DISORDER (ADHD), UNSPECIFIED ADHD TYPE: ICD-10-CM

## 2021-01-15 ENCOUNTER — OFFICE VISIT - HEALTHEAST (OUTPATIENT)
Dept: PHYSICAL THERAPY | Facility: CLINIC | Age: 36
End: 2021-01-15

## 2021-01-15 DIAGNOSIS — M51.27 PROTRUSION OF INTERVERTEBRAL DISC OF LUMBOSACRAL REGION: ICD-10-CM

## 2021-01-15 DIAGNOSIS — M54.50 CHRONIC LEFT-SIDED LOW BACK PAIN WITHOUT SCIATICA: ICD-10-CM

## 2021-01-15 DIAGNOSIS — M62.81 GENERALIZED MUSCLE WEAKNESS: ICD-10-CM

## 2021-01-15 DIAGNOSIS — G89.29 CHRONIC LEFT-SIDED LOW BACK PAIN WITHOUT SCIATICA: ICD-10-CM

## 2021-01-22 ENCOUNTER — OFFICE VISIT - HEALTHEAST (OUTPATIENT)
Dept: PHYSICAL THERAPY | Facility: CLINIC | Age: 36
End: 2021-01-22

## 2021-01-22 DIAGNOSIS — M51.27 PROTRUSION OF INTERVERTEBRAL DISC OF LUMBOSACRAL REGION: ICD-10-CM

## 2021-01-22 DIAGNOSIS — G89.29 CHRONIC LEFT-SIDED LOW BACK PAIN WITHOUT SCIATICA: ICD-10-CM

## 2021-01-22 DIAGNOSIS — M54.50 CHRONIC LEFT-SIDED LOW BACK PAIN WITHOUT SCIATICA: ICD-10-CM

## 2021-01-22 DIAGNOSIS — M62.81 GENERALIZED MUSCLE WEAKNESS: ICD-10-CM

## 2021-01-26 ENCOUNTER — OFFICE VISIT - HEALTHEAST (OUTPATIENT)
Dept: PHYSICAL THERAPY | Facility: CLINIC | Age: 36
End: 2021-01-26

## 2021-01-26 DIAGNOSIS — M62.81 GENERALIZED MUSCLE WEAKNESS: ICD-10-CM

## 2021-01-26 DIAGNOSIS — M51.27 PROTRUSION OF INTERVERTEBRAL DISC OF LUMBOSACRAL REGION: ICD-10-CM

## 2021-01-26 DIAGNOSIS — M54.50 CHRONIC LEFT-SIDED LOW BACK PAIN WITHOUT SCIATICA: ICD-10-CM

## 2021-01-26 DIAGNOSIS — G89.29 CHRONIC LEFT-SIDED LOW BACK PAIN WITHOUT SCIATICA: ICD-10-CM

## 2021-01-29 ENCOUNTER — OFFICE VISIT - HEALTHEAST (OUTPATIENT)
Dept: PHYSICAL THERAPY | Facility: CLINIC | Age: 36
End: 2021-01-29

## 2021-01-29 DIAGNOSIS — G89.29 CHRONIC LEFT-SIDED LOW BACK PAIN WITHOUT SCIATICA: ICD-10-CM

## 2021-01-29 DIAGNOSIS — M54.50 CHRONIC LEFT-SIDED LOW BACK PAIN WITHOUT SCIATICA: ICD-10-CM

## 2021-01-29 DIAGNOSIS — M51.27 PROTRUSION OF INTERVERTEBRAL DISC OF LUMBOSACRAL REGION: ICD-10-CM

## 2021-01-29 DIAGNOSIS — M62.81 GENERALIZED MUSCLE WEAKNESS: ICD-10-CM

## 2021-02-05 ENCOUNTER — OFFICE VISIT - HEALTHEAST (OUTPATIENT)
Dept: PHYSICAL THERAPY | Facility: CLINIC | Age: 36
End: 2021-02-05

## 2021-02-05 DIAGNOSIS — M54.50 CHRONIC LEFT-SIDED LOW BACK PAIN WITHOUT SCIATICA: ICD-10-CM

## 2021-02-05 DIAGNOSIS — M51.27 PROTRUSION OF INTERVERTEBRAL DISC OF LUMBOSACRAL REGION: ICD-10-CM

## 2021-02-05 DIAGNOSIS — G89.29 CHRONIC LEFT-SIDED LOW BACK PAIN WITHOUT SCIATICA: ICD-10-CM

## 2021-02-05 DIAGNOSIS — M62.81 GENERALIZED MUSCLE WEAKNESS: ICD-10-CM

## 2021-02-11 ENCOUNTER — COMMUNICATION - HEALTHEAST (OUTPATIENT)
Dept: FAMILY MEDICINE | Facility: CLINIC | Age: 36
End: 2021-02-11

## 2021-02-11 DIAGNOSIS — F90.9 ATTENTION DEFICIT HYPERACTIVITY DISORDER (ADHD), UNSPECIFIED ADHD TYPE: ICD-10-CM

## 2021-02-12 ENCOUNTER — OFFICE VISIT - HEALTHEAST (OUTPATIENT)
Dept: PHYSICAL THERAPY | Facility: CLINIC | Age: 36
End: 2021-02-12

## 2021-02-12 DIAGNOSIS — M54.50 CHRONIC LEFT-SIDED LOW BACK PAIN WITHOUT SCIATICA: ICD-10-CM

## 2021-02-12 DIAGNOSIS — G89.29 CHRONIC LEFT-SIDED LOW BACK PAIN WITHOUT SCIATICA: ICD-10-CM

## 2021-02-12 DIAGNOSIS — M62.81 GENERALIZED MUSCLE WEAKNESS: ICD-10-CM

## 2021-02-12 DIAGNOSIS — M51.27 PROTRUSION OF INTERVERTEBRAL DISC OF LUMBOSACRAL REGION: ICD-10-CM

## 2021-02-26 ENCOUNTER — OFFICE VISIT - HEALTHEAST (OUTPATIENT)
Dept: PHYSICAL THERAPY | Facility: CLINIC | Age: 36
End: 2021-02-26

## 2021-02-26 DIAGNOSIS — M62.81 GENERALIZED MUSCLE WEAKNESS: ICD-10-CM

## 2021-02-26 DIAGNOSIS — M54.50 CHRONIC LEFT-SIDED LOW BACK PAIN WITHOUT SCIATICA: ICD-10-CM

## 2021-02-26 DIAGNOSIS — M51.27 PROTRUSION OF INTERVERTEBRAL DISC OF LUMBOSACRAL REGION: ICD-10-CM

## 2021-02-26 DIAGNOSIS — G89.29 CHRONIC LEFT-SIDED LOW BACK PAIN WITHOUT SCIATICA: ICD-10-CM

## 2021-03-05 ENCOUNTER — OFFICE VISIT - HEALTHEAST (OUTPATIENT)
Dept: PHYSICAL THERAPY | Facility: CLINIC | Age: 36
End: 2021-03-05

## 2021-03-05 DIAGNOSIS — M51.27 PROTRUSION OF INTERVERTEBRAL DISC OF LUMBOSACRAL REGION: ICD-10-CM

## 2021-03-05 DIAGNOSIS — M62.81 GENERALIZED MUSCLE WEAKNESS: ICD-10-CM

## 2021-03-05 DIAGNOSIS — G89.29 CHRONIC LEFT-SIDED LOW BACK PAIN WITHOUT SCIATICA: ICD-10-CM

## 2021-03-05 DIAGNOSIS — M54.50 CHRONIC LEFT-SIDED LOW BACK PAIN WITHOUT SCIATICA: ICD-10-CM

## 2021-03-15 ENCOUNTER — COMMUNICATION - HEALTHEAST (OUTPATIENT)
Dept: FAMILY MEDICINE | Facility: CLINIC | Age: 36
End: 2021-03-15

## 2021-03-15 DIAGNOSIS — F90.9 ATTENTION DEFICIT HYPERACTIVITY DISORDER (ADHD), UNSPECIFIED ADHD TYPE: ICD-10-CM

## 2021-04-13 ENCOUNTER — COMMUNICATION - HEALTHEAST (OUTPATIENT)
Dept: FAMILY MEDICINE | Facility: CLINIC | Age: 36
End: 2021-04-13

## 2021-04-13 DIAGNOSIS — F90.9 ATTENTION DEFICIT HYPERACTIVITY DISORDER (ADHD), UNSPECIFIED ADHD TYPE: ICD-10-CM

## 2021-04-30 ENCOUNTER — OFFICE VISIT - HEALTHEAST (OUTPATIENT)
Dept: PHYSICAL THERAPY | Facility: CLINIC | Age: 36
End: 2021-04-30

## 2021-04-30 DIAGNOSIS — G89.29 CHRONIC LEFT-SIDED LOW BACK PAIN WITHOUT SCIATICA: ICD-10-CM

## 2021-04-30 DIAGNOSIS — M51.27 PROTRUSION OF INTERVERTEBRAL DISC OF LUMBOSACRAL REGION: ICD-10-CM

## 2021-04-30 DIAGNOSIS — M54.50 CHRONIC LEFT-SIDED LOW BACK PAIN WITHOUT SCIATICA: ICD-10-CM

## 2021-04-30 DIAGNOSIS — M62.81 GENERALIZED MUSCLE WEAKNESS: ICD-10-CM

## 2021-05-14 ENCOUNTER — COMMUNICATION - HEALTHEAST (OUTPATIENT)
Dept: FAMILY MEDICINE | Facility: CLINIC | Age: 36
End: 2021-05-14

## 2021-05-14 DIAGNOSIS — F90.9 ATTENTION DEFICIT HYPERACTIVITY DISORDER (ADHD), UNSPECIFIED ADHD TYPE: ICD-10-CM

## 2021-05-19 ENCOUNTER — HOSPITAL ENCOUNTER (OUTPATIENT)
Dept: PHYSICAL MEDICINE AND REHAB | Facility: CLINIC | Age: 36
Discharge: HOME | End: 2021-05-19
Attending: NURSE PRACTITIONER

## 2021-05-19 DIAGNOSIS — M51.26 PROTRUSION OF LUMBAR INTERVERTEBRAL DISC: ICD-10-CM

## 2021-05-19 DIAGNOSIS — M48.061 STENOSIS OF LATERAL RECESS OF LUMBAR SPINE: ICD-10-CM

## 2021-05-19 DIAGNOSIS — G89.29 CHRONIC LEFT-SIDED LOW BACK PAIN WITH LEFT-SIDED SCIATICA: ICD-10-CM

## 2021-05-19 DIAGNOSIS — M51.369 DDD (DEGENERATIVE DISC DISEASE), LUMBAR: ICD-10-CM

## 2021-05-19 DIAGNOSIS — M54.42 CHRONIC LEFT-SIDED LOW BACK PAIN WITH LEFT-SIDED SCIATICA: ICD-10-CM

## 2021-05-19 DIAGNOSIS — M54.16 LEFT LUMBAR RADICULITIS: ICD-10-CM

## 2021-05-27 NOTE — TELEPHONE ENCOUNTER
Controlled Substance Refill Request  Medication:   Requested Prescriptions     Pending Prescriptions Disp Refills     dextroamphetamine-amphetamine (ADDERALL XR) 25 MG 24 hr capsule 30 capsule 0     Sig: Take 1 capsule (25 mg total) by mouth daily. In morning     dextroamphetamine-amphetamine (ADDERALL) 10 mg Tab tablet 30 tablet 0     Sig: Take 1 tablet by mouth daily. In afternoon     Date Last Fill: 3/7/19  Pharmacy: [id number]   Submit electronically to pharmacy  Controlled Substance Agreement on File:   Encounter-Level CSA Scan Date:    There are no encounter-level csa scan date.       Last office visit: Last office visit pertaining to requested medication was 11/20/18.

## 2021-05-28 NOTE — TELEPHONE ENCOUNTER
Controlled Substance Refill Request  Medication:   Requested Prescriptions     Pending Prescriptions Disp Refills     dextroamphetamine-amphetamine (ADDERALL XR) 25 MG 24 hr capsule 30 capsule 0     Sig: Take 1 capsule (25 mg total) by mouth daily. In morning     dextroamphetamine-amphetamine (ADDERALL) 10 mg Tab tablet 30 tablet 0     Sig: Take 1 tablet by mouth daily. In afternoon     Date Last Fill: 4/5/19  Pharmacy: [facility] [address] [place] [address] [address] AT [place] [id number]   Submit electronically to pharmacy  Controlled Substance Agreement on File:   Encounter-Level CSA Scan Date:    There are no encounter-level csa scan date.       Last office visit: Last office visit pertaining to requested medication was 11/20/18.  Fariba Sun RN, MA  [facility] RN Triage Nurse Advisor

## 2021-05-29 NOTE — TELEPHONE ENCOUNTER
Controlled Substance Refill Request  Medication:   Requested Prescriptions     Pending Prescriptions Disp Refills     dextroamphetamine-amphetamine (ADDERALL XR) 25 MG 24 hr capsule 30 capsule 0     Sig: Take 1 capsule (25 mg total) by mouth daily. In morning     dextroamphetamine-amphetamine (ADDERALL) 10 mg Tab tablet 30 tablet 0     Sig: Take 1 tablet by mouth daily. In afternoon     Date Last Fill: 5/6/19  Pharmacy: [facility]   Submit electronically to pharmacy    Controlled Substance Agreement on File:   Encounter-Level CSA Scan Date:    There are no encounter-level csa scan date.       Last office visit with primary: Visit date not found

## 2021-05-30 VITALS — WEIGHT: 197 LBS | BODY MASS INDEX: 26.11 KG/M2 | HEIGHT: 73 IN

## 2021-05-30 NOTE — TELEPHONE ENCOUNTER
Controlled Substance Refill Request  Medication:   Requested Prescriptions     Pending Prescriptions Disp Refills     dextroamphetamine-amphetamine (ADDERALL XR) 25 MG 24 hr capsule 30 capsule 0     Sig: Take 1 capsule (25 mg total) by mouth daily. In morning     dextroamphetamine-amphetamine (ADDERALL) 10 mg Tab tablet 30 tablet 0     Sig: Take 1 tablet by mouth daily. In afternoon     Date Last Fill: 6/3/19  Pharmacy: [id number]   Submit electronically to pharmacy  Controlled Substance Agreement on File:   Encounter-Level CSA Scan Date:    There are no encounter-level csa scan date.       Last office visit: Last office visit pertaining to requested medication was 11/20/18.

## 2021-05-31 VITALS — WEIGHT: 195.13 LBS | BODY MASS INDEX: 26.1 KG/M2

## 2021-05-31 NOTE — TELEPHONE ENCOUNTER
Controlled Substance Refill Request  Medication:   Requested Prescriptions     Pending Prescriptions Disp Refills     dextroamphetamine-amphetamine (ADDERALL XR) 25 MG 24 hr capsule 30 capsule 0     Sig: Take 1 capsule (25 mg total) by mouth daily. In morning     dextroamphetamine-amphetamine (ADDERALL) 10 mg Tab tablet 30 tablet 0     Sig: Take 1 tablet by mouth daily. In afternoon     Date Last Fill: 7/3/19  Pharmacy: [id number]   Submit electronically to pharmacy  Controlled Substance Agreement on File:   Encounter-Level CSA Scan Date:    There are no encounter-level csa scan date.       Last office visit: Last office visit pertaining to requested medication was 11/20/18.

## 2021-05-31 NOTE — TELEPHONE ENCOUNTER
Controlled Substance Refill Request  Medication:   Requested Prescriptions     Pending Prescriptions Disp Refills     dextroamphetamine-amphetamine (ADDERALL XR) 25 MG 24 hr capsule 30 capsule 0     Sig: Take 1 capsule (25 mg total) by mouth daily. In morning     dextroamphetamine-amphetamine (ADDERALL) 10 mg Tab tablet 30 tablet 0     Sig: Take 1 tablet by mouth daily. In afternoon     Date Last Fill: 8/1/19  Pharmacy: [id number]   Submit electronically to pharmacy  Controlled Substance Agreement on File:   Encounter-Level CSA Scan Date:    There are no encounter-level csa scan date.       Last office visit: Last office visit pertaining to requested medication was 11/20/18.

## 2021-06-01 ENCOUNTER — HOSPITAL ENCOUNTER (OUTPATIENT)
Dept: MRI IMAGING | Facility: CLINIC | Age: 36
Discharge: HOME | End: 2021-06-01

## 2021-06-01 VITALS — BODY MASS INDEX: 25.31 KG/M2 | HEIGHT: 73 IN | WEIGHT: 191 LBS

## 2021-06-01 DIAGNOSIS — M51.369 DDD (DEGENERATIVE DISC DISEASE), LUMBAR: ICD-10-CM

## 2021-06-01 DIAGNOSIS — M48.061 STENOSIS OF LATERAL RECESS OF LUMBAR SPINE: ICD-10-CM

## 2021-06-01 DIAGNOSIS — M54.42 CHRONIC LEFT-SIDED LOW BACK PAIN WITH LEFT-SIDED SCIATICA: ICD-10-CM

## 2021-06-01 DIAGNOSIS — G89.29 CHRONIC LEFT-SIDED LOW BACK PAIN WITH LEFT-SIDED SCIATICA: ICD-10-CM

## 2021-06-01 DIAGNOSIS — M54.16 LEFT LUMBAR RADICULITIS: ICD-10-CM

## 2021-06-01 DIAGNOSIS — M51.26 PROTRUSION OF LUMBAR INTERVERTEBRAL DISC: ICD-10-CM

## 2021-06-01 NOTE — TELEPHONE ENCOUNTER
Controlled Substance Refill Request  Medication:   Requested Prescriptions     Pending Prescriptions Disp Refills     dextroamphetamine-amphetamine (ADDERALL XR) 25 MG 24 hr capsule 30 capsule 0     Sig: Take 1 capsule (25 mg total) by mouth daily. In morning     dextroamphetamine-amphetamine (ADDERALL) 10 mg Tab tablet 30 tablet 0     Sig: Take 1 tablet by mouth daily. In afternoon     Date Last Fill: 9/3/19  Pharmacy: [id number]   Submit electronically to pharmacy  Controlled Substance Agreement on File:   Encounter-Level CSA Scan Date:    There are no encounter-level csa scan date.       Last office visit: Last office visit pertaining to requested medication was 9/6/19.

## 2021-06-01 NOTE — PATIENT INSTRUCTIONS - HE
[facility] Physical Therapy location  [facility]  [phone]    Discussed the importance of core strengthening, ROM, stretching exercises with the patient and how each of these entities is important in decreasing pain.  Explained to the patient that the purpose of physical therapy is to teach the patient a home exercise program.  These exercises need to be performed every day in order to decrease pain and prevent future occurrences of pain.        Prescribed naproxen today. Please take 1 tablet 2-3 times daily as needed for pain control as well as to aid in decreasing inflammation. Take medication with a full glass of water and food. Do not take Advil, Ibuprofen, Aleve, or Naproxen while taking this medication.     ~Please call Nurse Navigation line [phone] with any questions or concerns about your treatment plan, if symptoms worsen and you would like to be seen urgently, or if you have problems controlling bladder and bowel function.  ~Follow Up Appointment time slots with Brittney Mejia, CNP with the Spine Center, are also available at the [facility] location near [facility] on the first and third THURSDAY afternoons of each month.

## 2021-06-01 NOTE — PROGRESS NOTES
ASSESSMENT: Grady Koo is a 34 y.o. male who presents for consultation at the request of HE PCP Kristen Royal MD, with a past medical history significant for anxiety, ADHD, familial hypercholesterolemia, history of bilateral carpal tunnel release surgery who presents today for new patient evaluation of:    -Chronic intermittent left low back pain with recent flareup x4 to 6 weeks.  MRI shows degenerative disc changes at L4-5 with disc protrusion and left lateral recess stenosis.    Patient is neurologically intact on exam. No myelopathic or red flag symptoms.     MARILOU Score: 26    WHO 5: 16     Diagnoses and all orders for this visit:    Acute left-sided low back pain without sciatica  -     Ambulatory referral to PT/OT  -     gabapentin (NEURONTIN) 300 MG capsule; Take 1 capsule (300 mg total) by mouth 3 (three) times a day. Follow Gabapentin Dosing chart given  Dispense: 90 capsule; Refill: 3  -     naproxen (NAPROSYN) 500 MG tablet; Take 1 tablet (500 mg total) by mouth 3 (three) times a day as needed.  Dispense: 42 tablet; Refill: 1    Chronic left-sided low back pain without sciatica  -     Ambulatory referral to PT/OT  -     gabapentin (NEURONTIN) 300 MG capsule; Take 1 capsule (300 mg total) by mouth 3 (three) times a day. Follow Gabapentin Dosing chart given  Dispense: 90 capsule; Refill: 3    Protrusion of lumbar intervertebral disc  -     Ambulatory referral to PT/OT    Stenosis of lateral recess of lumbar spine  -     Ambulatory referral to PT/OT      PLAN:  Reviewed spine anatomy and disease process. Discussed diagnosis and treatment options with the patient today. A shared decision making model was used.  The patient's values and choices were respected. The following represents what was discussed and decided upon by the provider and the patient.      -DIAGNOSTIC TESTS:  Images were personally reviewed and interpreted and explained to patient today using spine model.   --Lumbar spine MRI  9/17/2019 shows L4-5 left disc protrusion with mild spinal canal and left greater than right subarticular stenosis, foraminal stenosis.    -PHYSICAL THERAPY: Referral to physical therapy [facility] for lumbar MedX program for more intensive core strengthening as well as to establish home exercise program.  Discussed the importance of core strengthening, ROM, stretching exercises with the patient and how each of these entities is important in decreasing pain.  Explained to the patient that the purpose of physical therapy is to teach the patient a home exercise program.  These exercises need to be performed every day in order to decrease pain and prevent future occurrences of pain.        -MEDICATIONS: Prescribed naproxen 500 mg 1 tablet 3 times daily as needed pain inflammation at this time.  Advised patient to take this medication with food and full glass water and to avoid other NSAIDs while taking this medication.  -Also prescribed gabapentin 300 mg 1 tablet 3 times daily as tolerated for radicular type left low back pain.  Discussed multiple medication options today with patient. Discussed risks, side effects, and proper use of medications. Patient verbalized understanding.    -INTERVENTIONS: No recommendations for injections at this time.  Discussed however that his symptoms are not improving with physical therapy or aggravated by PT at any time I would recommend trialing a left L4-5 transforaminal epidural steroid injection.  Discussed risks and benefits of injections with patient today.    -PATIENT EDUCATION:  45 minutes of total visit time was spent face to face with the patient today, greater than 50% of total time spent with patient was spent on counseling, education, and coordinating care.   -10 minutes spent outside of visit time, non-face-to-face time, reviewing chart.    -FOLLOW-UP:   Follow-up in 4 weeks if symptoms are not improving, sooner pain is worsening or new symptoms  arise.    Advised patient to call the Spine Center if symptoms worsen or you have problems controlling bladder and bowel function.   ______________________________________________________________________    SUBJECTIVE:  HPI:  Grady Koo  Is a 34 y.o. male who presents today for new patient evaluation of low back pain left low back at the beltline that is been ongoing intermittent since 2006 however typically only flares up once or so a year lasting 5 to 7 days, has typically resolved on its own.  Symptoms had flared up after bending and twisting 4 to 6 weeks ago and has been more constant since currently 6/10, 9 at its worst, a 3 at its best it is more of a toothache/sore type of feeling the left low back again, patient currently denies any lower extremity pain, does report that back in 2006 he was having pain into the left leg at that time posterior hamstring however that did improve after he had an injection and completely resolved.  Patient denies numbness or tingling sensations, denies recent trips falls or balance changes, denies lower externally weakness, denies bowel or bladder loss control.    Patient has been quite active in the past, this summer had been running 3 to 4 miles on a regular basis, wondering if that did aggravate, for the last month or so he has not been able to do his normal activities due to limitations from his pain.    -Treatment to Date: No prior spinal surgery.  No prior physical therapy.    Lumbar Inj 2006 with resolution pain.    -Medications:  Flexeril 10 mg    Current Outpatient Medications on File Prior to Visit   Medication Sig Dispense Refill     cyclobenzaprine (FLEXERIL) 10 MG tablet Take 1 tablet (10 mg total) by mouth 3 (three) times a day as needed for muscle spasms. 30 tablet 1     buPROPion (WELLBUTRIN XL) 300 MG 24 hr tablet Take 1 tablet (300 mg total) by mouth daily. 90 tablet 3     dextroamphetamine-amphetamine (ADDERALL XR) 25 MG 24 hr capsule Take 1 capsule (25  mg total) by mouth daily. In morning 30 capsule 0     dextroamphetamine-amphetamine (ADDERALL) 10 mg Tab tablet Take 1 tablet by mouth daily. In afternoon 30 tablet 0     No current facility-administered medications on file prior to visit.        Allergies   Allergen Reactions     Morphine Nausea And Vomiting     Penicillins        Past Medical History:   Diagnosis Date     ADHD (attention deficit hyperactivity disorder) 9/23/2016     Anxiety 9/23/2016        Patient Active Problem List   Diagnosis     ADHD (attention deficit hyperactivity disorder)     Anxiety     Familial hypercholesterolemia       Past Surgical History:   Procedure Laterality Date     BICEPS TENDON REPAIR Right      CYST REMOVAL Left     behind left eye       Family History   Problem Relation Age of Onset     Hyperlipidemia Mother      Hyperlipidemia Father      Cancer Neg Hx      Heart disease Neg Hx        Reviewed past medical, surgical, and family history with patient found on new patient intake packet located in EMR Media tab.     SOCIAL HX: Patient is  works as an .  Patient denies smoking/tobacco use, does drink 3-5 drinks on weekends but denies history being heavy drinker, denies occasional drug use.    ROS: Positive for joint pain, muscle pain, anxiety/depression.  Specifically negative for bowel/bladder dysfunction, balance changes, headache, dizziness, foot drop, fevers, chills, appetite changes, nausea/vomiting, unexplained weight loss. Otherwise 13 systems reviewed are negative. Please see the patient's intake questionnaire from today for details.    OBJECTIVE:  /79 (Patient Site: Right Arm, Patient Position: Sitting)   Pulse 70   Wt 188 lb (85.3 kg)   BMI 24.80 kg/m      PHYSICAL EXAMINATION:    --CONSTITUTIONAL:  Vital signs as above.  No acute distress.  The patient is well nourished and well groomed.  --PSYCHIATRIC:  Appropriate mood and affect. The patient is awake, alert, oriented to person, place, time  and answering questions appropriately with clear speech.    --SKIN:  Skin over the face, bilateral lower extremities, and posterior torso is clean, dry, intact without rashes.    --RESPIRATORY: Normal rhythm and effort. No abnormal accessory muscle breathing patterns noted.   --STANDING EXAMINATION:  Normal lumbar lordosis noted, no lateral shift.  --MUSCULOSKELETAL: Lumbar spine inspection reveals no evidence of deformity. Range of motion is not limited in lumbar flexion, lateral rotation, limitations with lumbar extension however due to pain. No tenderness to palpation lumbar spine. Straight leg raising in the seated position is negative to radicular pain, positive to left low back pain on the left. Sciatic notch non-tender.  --SACROILIAC JOINT: One Finger point test negative.  --GROSS MOTOR: Gait is non-antalgic. Easily arises from a seated position. Toe walking and heel walking are normal without significant difficulty.    --LOWER EXTREMITY MOTOR TESTING:  Plantar flexion left 5/5, right 5/5   Dorsiflexion left 5/5, right 5/5   Great toe MTP extension left 5/5, right 5/5  Knee flexion left 5/5, right 5/5  Knee extension left 5/5, right 5/5   Hip flexion left 5/5, right 5/5  Hip abduction left 5/5, right 5/5  Hip adduction left 5/5, right 5/5   --HIPS: Full range of motion bilaterally.   --NEUROLOGICAL:  2/4 patellar, medial hamstring, and achilles reflexes bilaterally.  Sensation to light touch is intact in the bilateral L4, L5, and S1 dermatomes. Babinski is negative. No clonus.  Negative Rohan reflex bilaterally.  --VASCULAR:  2/4 dorsalis pedis and posterior tibialsi pulses bilaterally.  Bilateral lower extremities are warm.  There is no pitting edema of the bilateral lower extremities.    RESULTS: Prior medical records from [facility] 10/10/2016 to current were reviewed today.    Imaging: Lumbar spine Imaging was personally reviewed and interpreted today. The images were shown to the patient and the  findings were explained using a spine model.      Xr Lumbar Spine 2 Or 3 Vws  Result Date: 9/9/2019  EXAM: XR LUMBAR SPINE 2 OR 3 VWS LOCATION: [facility] DATE/TIME: 9/6/2019 4:21 PM INDICATION: Low back pain COMPARISON: None.   5 nonrib-bearing lumbar type vertebral bodies. Lumbar spine lordosis is maintained. Vertebral body heights are maintained. Lateral alignment is anatomic. Intervertebral disc space heights are maintained.      Mr Lumbar Spine Without Contrast  Result Date: 9/18/2019  INDICATION: Back pain or radiculopathy, > 6 weeks. Back pain or radiculopathy. COMPARISON: 09/06/2019 radiograph. TECHNIQUE: Without IV contrast FINDINGS: Nomenclature is based on five lumbar-type vertebral bodies. Vertebral body heights are maintained. Developing Schmorl's node along the inferior endplate of L4 with reactive marrow edema. The conus tip is identified at T12-L1. Cauda equina nerve roots are  unremarkable. Posterior paraspinal soft tissues are within normal limits.   T12-L1: Normal disc height and signal. No herniation. No facet arthropathy. No spinal canal stenosis. No right neural foraminal stenosis. No left neural foraminal stenosis.   L1-L2: Normal disc height and signal. No herniation. No facet arthropathy. No spinal canal stenosis. No right neural foraminal stenosis. No left neural foraminal stenosis.   L2-L3: Normal disc height and signal. No herniation. No facet arthropathy. No spinal canal stenosis. No right neural foraminal stenosis. No left neural foraminal stenosis.   L3-L4: Mild diffuse disc bulge. No facet arthropathy. No significant spinal canal stenosis. No right neural foraminal stenosis. No left neural foraminal stenosis.   L4-L5: Central/left paracentral disc protrusion. This results in mild spinal canal and left greater than right subarticular zone stenosis. No significant foraminal narrowing. No facet arthropathy.   L5-S1: Normal disc height and signal. No herniation. No facet arthropathy.  No spinal canal stenosis. No right neural foraminal stenosis. No left neural foraminal stenosis.   1.  At L4-L5, there is a central/left paracentral disc protrusion which results in mild spinal canal and left greater than right subarticular zone stenosis.   2.  No high-grade foraminal narrowing.   3.  Developing Schmorl's node along the inferior endplate of L4 with marrow edema.

## 2021-06-01 NOTE — PROGRESS NOTES
Assessment/Plan:     Patient presents today for routine physical examination.    Healthcare Maintenance: USPSTF recommendations for age 34;  Patient has been counseled on/screened for:  - intimate partner violence and there are no concerns at this time  - a healthful diet and physical activity for CVD disease prevention  - Diabetes and hyperlipidemia: screening was performed.   Sexually transmitted infections: Patient would not like to be screened for chlamydia, gonorrhea, syphilis, HIV, and hepatitis, ok for HIV  Immunizations: up to date except for influenza which was recommended    Additional concerns are as detailed below:    1. History of back surgery  2. Chronic midline low back pain without sciatica  3. Back muscle spasm  - xray lumbar spine, anticipate needing an MRI; will then refer to spine center  - cyclobenzaprine (FLEXERIL) 10 MG tablet; Take 1 tablet (10 mg total) by mouth 3 (three) times a day as needed for muscle spasms.  Dispense: 30 tablet; Refill: 1  - Ambulatory referral to [facility]    4. Attention deficit hyperactivity disorder (ADHD), predominantly inattentive type  Continue current medication; no sleep disturbance, no weight concerns  Controlled substance agreement signed    5. Anxiety  wellbutrin controlling symptoms well, no interest in changing dosing    6. Familial hypercholesterolemia  - Lipid Cascade RANDOM      Return to clinic in 1 year.    I have had an Advance Directives discussion with the patient.    Total time spent with patient was 40 minutes with greater than 50% spent in face-to-face counseling regarding the above plan.    This note has been dictated using voice recognition software. Any grammatical or context distortions are unintentional and inherent to the the software.     Kristen Royal MD  Family Medicine [facility]    Subjective:     Grady Koo is a 34 y.o. male who presents to clinic for routine physical.    Additional concerns include:    1. ADHD  -  no concerns, medication working well, no side effects, desires no change in dosing    2. Back pain:  - in 2006 patient had a 'minor surgery' where they 'put some fluid' in between his joints  - he has also had a steroid shot  - he thought he pulled a hamstring in college and it got worse and worse  - he then had MRI indicated a 'nerve was pinched'; after surgery it was miraculously better  - he used to have symptoms of radiating numbness in his foot  - he had several years where he felt much better and had no symptoms  - now he is starting to notice a recurrence; it is not triggered by lifting heavy things  - it only occurs intermittently, he thinks it might be triggered by running  - he often has to stop running to resolve the back pain  - however about 1 month ago he felt he was 'walking sideways' 2/2 to pain, today is the first day he feels good in a full month; he has been going to see the chiropractor which helped but it's not perfect  - he wonders if this is a recurrence, if the nerve is again pinched, if he has a herniated disk or if this will get worse; he is not noticing any numbness yet  - he often awakens and knows his back is warning him that it could 'go out' at any sudden movement; those days he is very cautious  - the pain is so sudden it will take your breath away, he knows he sometimes has nerve pain and sometimes has muscle spasms; it was so severe on Monday that he couldn't move and had to crawl to the bathrooms  - the pain is located just left of midline in the low back; it feels like there should be a bruise there    Diet: well balanced diet  Physical Activity: The patient maintains a regular, healthy exercise program.    PHQ-2 Score:  0    Health Care Directive: not on file but handout provided    Patient Care Team:   PCP: Kristen Royal     Past Medical History, Family History, and Social History reviewed.     Review of systems is as stated in HPI.  Patient endorses: back pain  The  "remainder of the 10 system review is otherwise negative.    Objective:     /88   Pulse 76   Ht 6' 1\" (1.854 m)   Wt 193 lb (87.5 kg)   SpO2 99%   BMI 25.46 kg/m    Gen: Alert, NAD, appears stated age, normal hygiene   Eyes: conjunctivae without injection, sclera clear, EOMI  ENT/mouth: nares clear, septum midline, absent rhinorrhea,absent pharyngeal injection, neck is supple, no thyroid enlargement  CV: RRR, no murmur appreciated, pedal edema absent bilaterally  Resp: CTAB, no wheezes, rales or ronchi  ABD: normoactive, non-tender to palpation, nondistended  MSK: Left shoulder is approximate 1 cm higher than the spine, no significant tenderness palpation along any of the spinous processes, no piriformis pain, able to toe touch without difficulty but refused to back bend, side bending to the right is painful, side bending to the left was normal and twisting was without difficulty  Neuro: CN II-XII grossly intact, no deficits in coordination  Psych: no apparent hallucinations or delusions, no pressured speech; alert, oriented x3  SKIN: dry and without lesions  Heme/lymph: no pallor, no active bleeding/bruising, no adenopathy appreciated    Medications:  Current Outpatient Medications   Medication Sig     buPROPion (WELLBUTRIN XL) 300 MG 24 hr tablet Take 1 tablet (300 mg total) by mouth daily.     dextroamphetamine-amphetamine (ADDERALL XR) 25 MG 24 hr capsule Take 1 capsule (25 mg total) by mouth daily. In morning     dextroamphetamine-amphetamine (ADDERALL) 10 mg Tab tablet Take 1 tablet by mouth daily. In afternoon     cyclobenzaprine (FLEXERIL) 10 MG tablet Take 1 tablet (10 mg total) by mouth 3 (three) times a day as needed for muscle spasms.       Allergies:  Allergies   Allergen Reactions     Penicillins        PMH:  Past Medical History:   Diagnosis Date     ADHD (attention deficit hyperactivity disorder) 9/23/2016     Anxiety 9/23/2016       PSH:  Past Surgical History:   Procedure Laterality Date "     BICEPS TENDON REPAIR Right      CYST REMOVAL Left     behind left eye       Family Hx:  Family History   Problem Relation Age of Onset     Hyperlipidemia Mother      Hyperlipidemia Father      Cancer Neg Hx      Heart disease Neg Hx        Social History:  Social History     Socioeconomic History     Marital status:      Spouse name: Not on file     Number of children: Not on file     Years of education: Not on file     Highest education level: Not on file   Occupational History     Not on file   Social Needs     Financial resource strain: Not on file     Food insecurity:     Worry: Not on file     Inability: Not on file     Transportation needs:     Medical: Not on file     Non-medical: Not on file   Tobacco Use     Smoking status: Never Smoker     Smokeless tobacco: Never Used   Substance and Sexual Activity     Alcohol use: Yes     Comment: social 2-3 times a week     Drug use: No     Sexual activity: Yes     Partners: Female   Lifestyle     Physical activity:     Days per week: Not on file     Minutes per session: Not on file     Stress: Not on file   Relationships     Social connections:     Talks on phone: Not on file     Gets together: Not on file     Attends Confucianist service: Not on file     Active member of club or organization: Not on file     Attends meetings of clubs or organizations: Not on file     Relationship status: Not on file     Intimate partner violence:     Fear of current or ex partner: Not on file     Emotionally abused: Not on file     Physically abused: Not on file     Forced sexual activity: Not on file   Other Topics Concern     Not on file   Social History Narrative     Not on file       LDL Calculated (mg/dL)   Date Value   04/27/2018 182 (H)   10/26/2017 206 (H)        Immunization History   Administered Date(s) Administered     Influenza, inj, historic,unspecified 10/26/2017     Influenza,seasonal,quad inj =/> 6months 10/26/2017, 11/20/2018, 09/06/2019     Tdap 10/26/2017      There are no preventive care reminders to display for this patient.

## 2021-06-02 ENCOUNTER — COMMUNICATION - HEALTHEAST (OUTPATIENT)
Dept: PHYSICAL MEDICINE AND REHAB | Facility: CLINIC | Age: 36
End: 2021-06-02

## 2021-06-02 ENCOUNTER — AMBULATORY - HEALTHEAST (OUTPATIENT)
Dept: PHYSICAL MEDICINE AND REHAB | Facility: CLINIC | Age: 36
End: 2021-06-02

## 2021-06-02 VITALS — WEIGHT: 186 LBS | HEIGHT: 73 IN | BODY MASS INDEX: 24.65 KG/M2

## 2021-06-02 DIAGNOSIS — G89.29 CHRONIC LEFT-SIDED LOW BACK PAIN WITH LEFT-SIDED SCIATICA: ICD-10-CM

## 2021-06-02 DIAGNOSIS — M54.16 LEFT LUMBAR RADICULITIS: ICD-10-CM

## 2021-06-02 DIAGNOSIS — M51.26 PROTRUSION OF LUMBAR INTERVERTEBRAL DISC: ICD-10-CM

## 2021-06-02 DIAGNOSIS — M54.42 CHRONIC LEFT-SIDED LOW BACK PAIN WITH LEFT-SIDED SCIATICA: ICD-10-CM

## 2021-06-02 DIAGNOSIS — M48.061 STENOSIS OF LATERAL RECESS OF LUMBAR SPINE: ICD-10-CM

## 2021-06-02 DIAGNOSIS — M51.369 DDD (DEGENERATIVE DISC DISEASE), LUMBAR: ICD-10-CM

## 2021-06-02 NOTE — TELEPHONE ENCOUNTER
Controlled Substance Refill Request  Medication:   Requested Prescriptions     Pending Prescriptions Disp Refills     dextroamphetamine-amphetamine (ADDERALL XR) 25 MG 24 hr capsule 30 capsule 0     Sig: Take 1 capsule (25 mg total) by mouth daily. In morning     dextroamphetamine-amphetamine (ADDERALL) 10 mg Tab tablet 30 tablet 0     Sig: Take 1 tablet by mouth daily. In afternoon     Date Last Fill: 10/3/19  Pharmacy: [id number]   Submit electronically to pharmacy  Controlled Substance Agreement on File:   Encounter-Level CSA Scan Date:    There are no encounter-level csa scan date.       Last office visit: Last office visit pertaining to requested medication was 9/6/19.

## 2021-06-03 VITALS
SYSTOLIC BLOOD PRESSURE: 137 MMHG | BODY MASS INDEX: 24.8 KG/M2 | WEIGHT: 188 LBS | HEART RATE: 70 BPM | DIASTOLIC BLOOD PRESSURE: 79 MMHG

## 2021-06-03 VITALS
WEIGHT: 193 LBS | HEIGHT: 73 IN | BODY MASS INDEX: 25.58 KG/M2 | OXYGEN SATURATION: 99 % | SYSTOLIC BLOOD PRESSURE: 120 MMHG | DIASTOLIC BLOOD PRESSURE: 88 MMHG | HEART RATE: 76 BPM

## 2021-06-03 NOTE — TELEPHONE ENCOUNTER
Controlled Substance Refill Request  Medication:   Requested Prescriptions     Pending Prescriptions Disp Refills     dextroamphetamine-amphetamine (ADDERALL XR) 25 MG 24 hr capsule 30 capsule 0     Sig: Take 1 capsule (25 mg total) by mouth daily. In morning     dextroamphetamine-amphetamine (ADDERALL) 10 mg Tab tablet 30 tablet 0     Sig: Take 1 tablet by mouth daily. In afternoon     Date Last Fill: 10.31.19  [facility]   Submit electronically to pharmacy  Controlled Substance Agreement on File:   Encounter-Level CSA Scan Date:    There are no encounter-level csa scan date.       Last office visit: Last office visit pertaining to requested medication was 9.19.19.

## 2021-06-04 NOTE — TELEPHONE ENCOUNTER
Refill Approved    Rx renewed per Medication Renewal Policy. Medication was last renewed on 11/20/18.    Lupe Orellana, [facility] Triage/Med Refill 12/30/2019     Requested Prescriptions   Pending Prescriptions Disp Refills     buPROPion (WELLBUTRIN XL) 300 MG 24 hr tablet [Pharmacy Med Name: BUPROPION XL 300MG TABLETS] 90 tablet 3     Sig: TAKE 1 TABLET BY MOUTH DAILY       Tricyclics/Misc Antidepressant/Antianxiety Meds Refill Protocol Passed - 12/29/2019  2:46 PM        Passed - PCP or prescribing provider visit in last year     Last office visit with prescriber/PCP: 11/20/2018 Kristen Royal MD OR same dept: Visit date not found OR same specialty: 11/20/2018 Kristen Royal MD  Last physical: 9/6/2019 Last MTM visit: Visit date not found   Next visit within 3 mo: Visit date not found  Next physical within 3 mo: Visit date not found  Prescriber OR PCP: Kristen Royal MD  Last diagnosis associated with med order: 1. Anxiety  - buPROPion (WELLBUTRIN XL) 300 MG 24 hr tablet [Pharmacy Med Name: BUPROPION XL 300MG TABLETS]; TAKE 1 TABLET BY MOUTH DAILY  Dispense: 90 tablet; Refill: 3    If protocol passes may refill for 12 months if within 3 months of last provider visit (or a total of 15 months).

## 2021-06-04 NOTE — TELEPHONE ENCOUNTER
Controlled Substance Refill Request  Medication:   Requested Prescriptions     Pending Prescriptions Disp Refills     dextroamphetamine-amphetamine (ADDERALL XR) 25 MG 24 hr capsule 30 capsule 0     Sig: Take 1 capsule (25 mg total) by mouth daily. In morning     dextroamphetamine-amphetamine (ADDERALL) 10 mg Tab tablet 30 tablet 0     Sig: Take 1 tablet by mouth daily. In afternoon     Date Last Fill:   dextroamphetamine-amphetamine (ADDERALL XR) 25 MG 24 hr capsule 30 capsule 0 12/3/2019  No   Sig - Route: Take 1 capsule (25 mg total) by mouth daily. In morning - Oral   Sent to pharmacy as: dextroamphetamine-amphetamine ER 25 mg 24hr capsule,extend release (ADDERALL XR)   Earliest Fill Date: 12/3/2019   Notes to Pharmacy: Rx 1 of 3     dextroamphetamine-amphetamine (ADDERALL) 10 mg Tab tablet 30 tablet 0 12/3/2019  No   Sig - Route: Take 1 tablet by mouth daily. In afternoon - Oral   Sent to pharmacy as: dextroamphetamine-amphetamine 10 mg tablet (ADDERALL)   Earliest Fill Date: 12/3/2019   E-Prescribing Status: Receipt confirmed by pharmacy (12/3/2019  7:00 AM CST)       [facility]   Submit electronically to pharmacy  Controlled Substance Agreement on File:   Encounter-Level CSA Scan Date:    There are no encounter-level csa scan date.       Last office visit: Last office visit pertaining to requested medication was 9/6/19.

## 2021-06-05 VITALS
SYSTOLIC BLOOD PRESSURE: 128 MMHG | HEIGHT: 73 IN | DIASTOLIC BLOOD PRESSURE: 88 MMHG | WEIGHT: 195 LBS | HEART RATE: 66 BPM | BODY MASS INDEX: 25.84 KG/M2 | OXYGEN SATURATION: 98 %

## 2021-06-06 NOTE — TELEPHONE ENCOUNTER
Controlled Substance Refill Request  Medication Name:   Requested Prescriptions     Pending Prescriptions Disp Refills     dextroamphetamine-amphetamine (ADDERALL) 10 mg Tab tablet 30 tablet 0     Sig: Take 1 tablet by mouth daily. In afternoon     dextroamphetamine-amphetamine (ADDERALL XR) 15 MG 24 hr capsule 30 capsule 0     Sig: Take 1 capsule (15 mg total) by mouth daily.     Date Last Fill: 1/27/20  [facility]ed [facility]  Submit electronically to pharmacy  Controlled Substance Agreement on file:   Encounter-Level CSA Scan Date:    There are no encounter-level csa scan date.        Last office visit:  9/19/19

## 2021-06-07 NOTE — TELEPHONE ENCOUNTER
Controlled Substance Refill Request  Medication Name:   Requested Prescriptions     Pending Prescriptions Disp Refills     dextroamphetamine-amphetamine (ADDERALL) 10 mg Tab tablet 30 tablet 0     Sig: Take 1 tablet by mouth daily. In afternoon     dextroamphetamine-amphetamine (ADDERALL XR) 15 MG 24 hr capsule 30 capsule 0     Sig: Take 1 capsule (15 mg total) by mouth daily.     Date Last Fill: 2/27/20  [facility]ed [facility]  Submit electronically to pharmacy  Controlled Substance Agreement on file:   Encounter-Level CSA Scan Date:    There are no encounter-level csa scan date.        Last office visit:  9/19/19

## 2021-06-08 NOTE — TELEPHONE ENCOUNTER
Controlled Substance Refill Request  Medication Name:   Requested Prescriptions     Pending Prescriptions Disp Refills     dextroamphetamine-amphetamine (ADDERALL) 10 mg Tab tablet 30 tablet 0     Sig: Take 1 tablet by mouth daily. In afternoon     dextroamphetamine-amphetamine (ADDERALL XR) 15 MG 24 hr capsule 30 capsule 0     Sig: Take 1 capsule (15 mg total) by mouth daily.     Date Last Fill: 4/13/20  [facility]ed [facility]  Submit electronically to pharmacy  Controlled Substance Agreement on file:   Encounter-Level CSA Scan Date:    There are no encounter-level csa scan date.        Last office visit:  9/6/19

## 2021-06-09 NOTE — PROGRESS NOTES
Assessment/Plan:     1. Anxiety  Currently well controlled on Wellbutrin refill provided declines need for therapy or further workup at this time.      2. ADHD (attention deficit hyperactivity disorder)  Currently well controlled without any signs of abuse or misuse.  Patient states it is working well has been on medication long-term.  Reviewed [place] prescription monitoring report without any apparent use.  Provided refill with 6 months postdated prescriptions.  Patient should plan to follow-up 6 months from today's visit for evaluation or if there are any concerns with dosage adjustment.    Recent labs or preventative care workup but he declines today.  May consider in future.      Subjective:      Grady Koo is a 32 y.o. male comes in today for medication review.  Saw him approximately 6 months ago as a new patient.  He was stable on adult ADHD medications.  Provided refill he states it is working well.  He is not having any significant difficulty concentrating.  Weight has been stable and actually slightly increased from previous visit but that is okay as patient is on the inside.  Blood pressures been stable no other significant side effects.  He has no other new or significant concerns.  Feels that anxiety is well controlled.  Did review with patient that we have no    Current Outpatient Prescriptions   Medication Sig Dispense Refill     buPROPion (WELLBUTRIN XL) 300 MG 24 hr tablet Take 1 tablet (300 mg total) by mouth daily. 90 tablet 0     dextroamphetamine-amphetamine (ADDERALL XR) 20 MG 24 hr capsule TAKE 1 CAPSULE BY MOUTH EVERY MORNING 30 capsule 0     dextroamphetamine-amphetamine (ADDERALL) 10 mg Tab tablet TAKE 1 TABLET BY MOUTH AT 4PM DAILY. 30 tablet 0     No current facility-administered medications for this visit.        Past Medical History, Family History, and Social History reviewed.  Past Medical History:   Diagnosis Date     ADHD (attention deficit hyperactivity disorder)  "9/23/2016     Anxiety 9/23/2016     Past Surgical History:   Procedure Laterality Date     BICEPS TENDON REPAIR Right      Penicillins  Family History   Problem Relation Age of Onset     No Medical Problems Mother      Hyperlipidemia Father      Cancer Neg Hx      Heart disease Neg Hx      Social History     Social History     Marital status:      Spouse name: N/A     Number of children: N/A     Years of education: N/A     Occupational History     Not on file.     Social History Main Topics     Smoking status: Never Smoker     Smokeless tobacco: Not on file     Alcohol use Yes      Comment: social 2-3 times a week     Drug use: No     Sexual activity: Yes     Partners: Female     Other Topics Concern     Not on file     Social History Narrative         Review of systems is as stated in HPI, and the remainder of the 10 system review is otherwise negative.    Objective:     Vitals:    04/04/17 1411   BP: 122/72   Pulse: 85   SpO2: 99%   Weight: 197 lb (89.4 kg)   Height: 6' 0.5\" (1.842 m)    Body mass index is 26.35 kg/(m^2).      General Appearance:    Alert, cooperative, no distress, appears stated age   Head:    Normocephalic, without obvious abnormality, atraumatic   Neck:   Supple, symmetrical, no adenopathy, no thyromegally       Lungs:     Clear to auscultation bilaterally, respirations unlabored    Heart:    Regular rate and rhythm, S1 and S2 normal, no murmur   Psych :    normal mood and affect without acute psychosis or anxiety.     Extremities:   Extremities normal, atraumatic, no cyanosis or edema   Skin:   No rashes or lesions       This note has been dictated using voice recognition software. Any grammatical or context distortions are unintentional and inherent to the the software.   "

## 2021-06-09 NOTE — TELEPHONE ENCOUNTER
Controlled Substance Refill Request  Medication Name:   Requested Prescriptions     Pending Prescriptions Disp Refills     dextroamphetamine-amphetamine (ADDERALL) 10 mg Tab tablet 30 tablet 0     Sig: Take 1 tablet by mouth daily. In afternoon     dextroamphetamine-amphetamine (ADDERALL XR) 15 MG 24 hr capsule 30 capsule 0     Sig: Take 1 capsule (15 mg total) by mouth daily.     Date Last Fill: 5/26/20  [facility]  Submit electronically to pharmacy  Controlled Substance Agreement on file:   Encounter-Level CSA Scan Date:    There are no encounter-level csa scan date.        Last office visit:  9/19/19       EXAM DESCRIPTION:  PICC INSERTION; FLUORO/CV PLACEMENT; U/S GUIDE FOR VASCULAR ACCESS



COMPLETED DATE/TIME:  3/5/2019 11:23 am



REASON FOR STUDY:  PICC LINE INSERTION FOR IV ANTIBIOTICS; IV ABX; IV ACCESS



COMPARISON:  AP chest 3/3/2019



FLUOROSCOPY TIME:  4 seconds

1 ultrasound and 1 C-arm fluoro images saved to PACS.



TECHNIQUE:  Fluoroscopic and ultrasound guided PICC placement.



LIMITATIONS:  None.



PROCEDURE:  After written consent and assessment were obtained, the patient was brought into the fluo
roscopy room and placed supine on the table. Ultrasound evaluation of potential access sites were per
formed. After successfully identifying a patent right basilic vein, the right arm was prepped and liban
ped in a sterile fashion along with the ultrasound probe. The entry site was anesthetized with 1% lid
ocaine. A 21 gauge 7 cm needle was advanced through the skin and into the basilic vein under live ult
rasound guidance.  An ultrasound image was saved to PACS confirming access site.  A .018 guide wire w
as then inserted through the needle and into the venous system. The needle was then removed and an 11
 blade scalpel was used to make a 1cm skin incision.  A 5 fr peel-away sheath was advanced over the w
kevan and into the venous system. A measurement was then made using the existing wire and live fluorosc
opic guidance. The wire was then removed and trimmed. The PICC was advanced through the peel-away she
ath and into the venous system. The peel-away sheath was removed and the catheter was adhered to the 
patients arm with a stat lock. The catheter was then aspirated and flushed and a sterile bandage was 
placed over the access site.  A fluoroscopic spot image was saved to PACS confirming the catheter tip
 within the superior vena cava.



IMPRESSION:  SUCCESSFUL PLACEMENT OF A 5 FR DUAL LUMEN 41 CM PICC IN THE RIGHT BASILIC VEIN.



COMMENT:  Patient medication list reviewed: Yes- Quality ID# 130:Eligible professional attests to doc
umenting in the medical record they obtained, updated, or reviewed the patient's current medications.
.

Quality :  Final reports for procedures using fluoroscopy that document radiation exposure lópez
yayo, or exposure time and number of fluorographic images (if radiation exposure indices are not avail
able)

Quality ID #[id number]: The patient was prepped and draped using maximum sterile barrier technique including 
cap, mask, sterile gown, sterile gloves, a large sterile sheet, hand hygiene, and 2% Chlorhexidine fo
r cutaneous antisepsis. When ultrasound is used, sterile ultrasound techniques are followed requiring
 sterile gel and sterile probes.



TECHNICAL DOCUMENTATION:  JOB ID:  [id number]

 2011 [employer]- All Rights Reserved                           rev-5/18



Reading location - IP/workstation name: [email or web address]

## 2021-06-10 NOTE — TELEPHONE ENCOUNTER
Controlled Substance Refill Request  Medication Name:   Requested Prescriptions     Pending Prescriptions Disp Refills     dextroamphetamine-amphetamine (ADDERALL) 10 mg Tab tablet 30 tablet 0     Sig: Take 1 tablet by mouth daily. In afternoon     dextroamphetamine-amphetamine (ADDERALL XR) 15 MG 24 hr capsule 30 capsule 0     Sig: Take 1 capsule (15 mg total) by mouth daily.     Date Last Fill: 6/25/20  [facility]  Submit electronically to pharmacy  Controlled Substance Agreement on file:   Encounter-Level CSA Scan Date:    There are no encounter-level csa scan date.        Last office visit:  9/6/19

## 2021-06-11 ENCOUNTER — COMMUNICATION - HEALTHEAST (OUTPATIENT)
Dept: FAMILY MEDICINE | Facility: CLINIC | Age: 36
End: 2021-06-11

## 2021-06-11 DIAGNOSIS — F90.9 ATTENTION DEFICIT HYPERACTIVITY DISORDER (ADHD), UNSPECIFIED ADHD TYPE: ICD-10-CM

## 2021-06-11 RX ORDER — DEXTROAMPHETAMINE SACCHARATE, AMPHETAMINE ASPARTATE MONOHYDRATE, DEXTROAMPHETAMINE SULFATE AND AMPHETAMINE SULFATE 3.75; 3.75; 3.75; 3.75 MG/1; MG/1; MG/1; MG/1
15 CAPSULE, EXTENDED RELEASE ORAL DAILY
Qty: 30 CAPSULE | Refills: 0 | Status: SHIPPED | OUTPATIENT
Start: 2021-06-11 | End: 2021-07-16

## 2021-06-11 RX ORDER — DEXTROAMPHETAMINE SACCHARATE, AMPHETAMINE ASPARTATE, DEXTROAMPHETAMINE SULFATE AND AMPHETAMINE SULFATE 2.5; 2.5; 2.5; 2.5 MG/1; MG/1; MG/1; MG/1
10 TABLET ORAL DAILY
Qty: 30 TABLET | Refills: 0 | Status: SHIPPED | OUTPATIENT
Start: 2021-06-11 | End: 2021-07-16

## 2021-06-11 NOTE — TELEPHONE ENCOUNTER
Controlled Substance Refill Request  Medication Name:   Requested Prescriptions     Pending Prescriptions Disp Refills     dextroamphetamine-amphetamine (ADDERALL) 10 mg Tab tablet 30 tablet 0     Sig: Take 1 tablet by mouth daily. In afternoon     dextroamphetamine-amphetamine (ADDERALL XR) 15 MG 24 hr capsule 30 capsule 0     Sig: Take 1 capsule (15 mg total) by mouth daily.     Date Last Fill: 7/30/20  [facility]  Submit electronically to pharmacy  Controlled Substance Agreement on file:   Encounter-Level CSA Scan Date:    There are no encounter-level csa scan date.        Last office visit:  9/19/19

## 2021-06-11 NOTE — TELEPHONE ENCOUNTER
RN cannot approve Refill Request    RN can NOT refill this medication Protocol failed and NO refill given. Last office visit: 11/20/2018 Kristen Royal MD Last Physical: 9/6/2019 Last MTM visit: Visit date not found Last visit same specialty: 11/20/2018 Kristen Royal MD.  Next visit within 3 mo: Visit date not found  Next physical within 3 mo: Visit date not found      Lupe Orellana, [facility] Triage/Med Refill 9/24/2020    Requested Prescriptions   Pending Prescriptions Disp Refills     buPROPion (WELLBUTRIN XL) 300 MG 24 hr tablet 90 tablet 2     Sig: Take 1 tablet (300 mg total) by mouth daily.       Tricyclics/Misc Antidepressant/Antianxiety Meds Refill Protocol Failed - 9/23/2020  1:48 PM        Failed - PCP or prescribing provider visit in last year     Last office visit with prescriber/PCP: 11/20/2018 Kristen Royal MD OR same dept: Visit date not found OR same specialty: 11/20/2018 Kristen Royal MD  Last physical: 9/6/2019 Last MTM visit: Visit date not found   Next visit within 3 mo: Visit date not found  Next physical within 3 mo: Visit date not found  Prescriber OR PCP: Kristen Royal MD  Last diagnosis associated with med order: 1. Anxiety  - buPROPion (WELLBUTRIN XL) 300 MG 24 hr tablet; Take 1 tablet (300 mg total) by mouth daily.  Dispense: 90 tablet; Refill: 2    If protocol passes may refill for 12 months if within 3 months of last provider visit (or a total of 15 months).

## 2021-06-12 NOTE — PROGRESS NOTES
Assessment:     Diagnoses and all orders for this visit:    Chronic left-sided low back pain without sciatica  -     Ambulatory referral to PT/OT    Stenosis of lateral recess of lumbar spine  -     Ambulatory referral to PT/OT    Protrusion of lumbar intervertebral disc  -     Ambulatory referral to PT/OT    DDD (degenerative disc disease), lumbar  -     Ambulatory referral to PT/OT       Grady YENI Koo is a 35 y.o. y.o. male with past medical history significant for anxiety, ADHD, familial hypercholesterolemia, history of bilateral carpal tunnel release surgery who presents today for follow-up regarding:    -Acute on chronic midline low back pain with left lower extremity paresthesias with good relief with recent left L4-5 TFESI.  MRI 2019 showed degenerative disc changes with protrusion at L4-5 with left subarticular recess stenosis, no high-grade foraminal stenosis.     Plan:     A shared decision making plan was used. The patient's values and choices were respected. Prior medical records from 10/7/2020 to current were reviewed today. The following represents what was discussed and decided upon by the provider and the patient.        -DIAGNOSTIC TESTS: Images were personally reviewed and interpreted.   --Lumbar spine MRI 9/17/2019 shows L4-5 left disc protrusion with mild spinal canal and left greater than right subarticular stenosis, no foraminal stenosis at any level.    -INTERVENTIONS: No further injection recommendations at this time.    -MEDICATIONS: Patient is not interested in further medications and doing well without.  Discussed side effects of medications and proper use. Patient verbalized understanding.    -PHYSICAL THERAPY: Referral to physical therapy placed to [facility] for intensive core strengthening lumbar MedX program as well as establishing home exercises for core strengthening long-term maintenance and nerve glides.  Discussed the importance of core strengthening, ROM,  stretching exercises with the patient and how each of these entities is important in decreasing pain.  Explained to the patient that the purpose of physical therapy is to teach the patient a home exercise program.  These exercises need to be performed every day in order to decrease pain and prevent future occurrences of pain.        -PATIENT EDUCATION:  20 minutes of total visit time was spent face to face with the patient today, 60 % of the visit was spent on counseling, education, and coordinating care.   -5 minutes spent outside of visit time, non-face-to-face time, reviewing chart.  -Today we also discussed the issues related to the current COVID-19 pandemic, the pros and cons of the current treatment plan, the CDC guidelines such as social distancing, washing the hands, and covering the cough.    -FOLLOW UP: Follow-up as needed if symptoms are worsening or changing at any time.  Advised to contact clinic if symptoms worsen or change.    Subjective:     Grady Koo is a 35 y.o. male who presents today for follow-up regarding ongoing intermittent chronic bilateral low back pain that have been worse in the last couple of years on the left lumbosacral junction where he was having numbness and tingling down the left lower extremity as well.  He has gotten good relief with recent left L4-5 TFESI with Dr. Schwartz and does feel much improvement.  Currently his pain is a 3/10 from a 6 at its worst me 1 at its best still.  He does report that being active is aggravating or sitting does make his pain feel better.  Patient denies any lower extremity weakness, denies any episodes of his legs giving out on him, denies recent trips or falls or balance changes, denies bowel or bladder loss control.    -Treatment to Date: No prior spinal surgery.  No prior physical therapy.     Lumbar Inj 2006 with resolution pain.  Lumbar CHRISTIANO July 2020 in [place], awaiting records for specific level and laterality completed.  Left  L4-5 TFESI 10/16/2020 with significant relief.  Preprocedure pain 5/10, post 3/10.     -Medications:  Flexeril 10 mg     Past medications:  Gabapentin 300 mg with no relief and significant side effects    Patient Active Problem List   Diagnosis     ADHD (attention deficit hyperactivity disorder)     Anxiety     Familial hypercholesterolemia     Chronic back pain       Current Outpatient Medications on File Prior to Encounter   Medication Sig Dispense Refill     cyclobenzaprine (FLEXERIL) 10 MG tablet Take 1 tablet (10 mg total) by mouth 3 (three) times a day as needed for muscle spasms. 30 tablet 1     buPROPion (WELLBUTRIN XL) 150 MG 24 hr tablet Take 1 tablet (150 mg total) by mouth daily. 90 tablet 3     dextroamphetamine-amphetamine (ADDERALL XR) 15 MG 24 hr capsule Take 1 capsule (15 mg total) by mouth daily. 30 capsule 0     dextroamphetamine-amphetamine (ADDERALL) 10 mg Tab tablet Take 1 tablet by mouth daily. In afternoon 30 tablet 0     No current facility-administered medications on file prior to encounter.        Allergies   Allergen Reactions     Morphine Nausea And Vomiting     Penicillins        Past Medical History:   Diagnosis Date     ADHD (attention deficit hyperactivity disorder) 9/23/2016     Anxiety 9/23/2016        Review of Systems  ROS:  Specifically negative for bowel/bladder dysfunction, balance changes, headache, dizziness, foot drop, fevers, chills, appetite changes, nausea/vomiting, unexplained weight loss. Otherwise 13 systems reviewed are negative. Please see the patient's intake questionnaire from today for details.    Reviewed Social, Family, Past Medical and Past Surgical history with patient, no significant changes noted since prior visit.     Objective:     /72 (Patient Site: Right Arm, Patient Position: Sitting)     PHYSICAL EXAMINATION:    --CONSTITUTIONAL: Well developed, well nourished, healthy appearing individual.  --PSYCHIATRIC: Appropriate mood and affect. No  difficulty interacting due to temper, social withdrawal, or memory issues.  --SKIN: Lumbar region is dry and intact.   --RESPIRATORY: Normal rhythm and effort. No abnormal accessory muscle breathing patterns noted.   --MUSCULOSKELETAL:  Normal lumbar lordosis noted, no lateral shift.  --GROSS MOTOR: Easily arises from a seated position. Gait is non-antalgic  --LUMBAR SPINE:  Inspection reveals no evidence of deformity.     RESULTS:   Imaging: Lumbar spine imaging was reviewed today. The images were shown to the patient and the findings were explained using a spine model.      Xr Lumbar Spine 2 Or 3 Vws  Result Date: 9/9/2019  EXAM: XR LUMBAR SPINE 2 OR 3 VWS LOCATION: [facility] DATE/TIME: 9/6/2019 4:21 PM INDICATION: Low back pain COMPARISON: None.   5 nonrib-bearing lumbar type vertebral bodies. Lumbar spine lordosis is maintained. Vertebral body heights are maintained. Lateral alignment is anatomic. Intervertebral disc space heights are maintained.        Mr Lumbar Spine Without Contrast  Result Date: 9/18/2019  INDICATION: Back pain or radiculopathy, > 6 weeks. Back pain or radiculopathy. COMPARISON: 09/06/2019 radiograph. TECHNIQUE: Without IV contrast FINDINGS: Nomenclature is based on five lumbar-type vertebral bodies. Vertebral body heights are maintained. Developing Schmorl's node along the inferior endplate of L4 with reactive marrow edema. The conus tip is identified at T12-L1. Cauda equina nerve roots are  unremarkable. Posterior paraspinal soft tissues are within normal limits.   T12-L1: Normal disc height and signal. No herniation. No facet arthropathy. No spinal canal stenosis. No right neural foraminal stenosis. No left neural foraminal stenosis.   L1-L2: Normal disc height and signal. No herniation. No facet arthropathy. No spinal canal stenosis. No right neural foraminal stenosis. No left neural foraminal stenosis.   L2-L3: Normal disc height and signal. No herniation. No facet arthropathy. No  spinal canal stenosis. No right neural foraminal stenosis. No left neural foraminal stenosis.   L3-L4: Mild diffuse disc bulge. No facet arthropathy. No significant spinal canal stenosis. No right neural foraminal stenosis. No left neural foraminal stenosis.   L4-L5: Central/left paracentral disc protrusion. This results in mild spinal canal and left greater than right subarticular zone stenosis. No significant foraminal narrowing. No facet arthropathy.   L5-S1: Normal disc height and signal. No herniation. No facet arthropathy. No spinal canal stenosis. No right neural foraminal stenosis. No left neural foraminal stenosis.   1.  At L4-L5, there is a central/left paracentral disc protrusion which results in mild spinal canal and left greater than right subarticular zone stenosis.   2.  No high-grade foraminal narrowing.   3.  Developing Schmorl's node along the inferior endplate of L4 with marrow edema.         Lumbar spine x-ray 7/22/2020 [facility]  Transitional vertebra lumbar  thoracolumbar junction.  Mild list to the left.  Some sclerosis of the sacroiliac joints is noted.  Lateral x-ray shows flattening of the lumbar spine lordosis.  Irregularity at the L4-5 disc space involving superior endplate of L5.  Left oblique view shows some narrowing of the L4-5 facet.  No evidence of spondylolysis.  Right oblique view shows some narrowing of the L4-5 facet.  Facet arthropathy L5-S1.        Lumbar spine MRI 2005-[facility]– [place][note 2]  Impression: Degenerative disc findings at L4-5 with posterior annular tear at L4-5, small central disc protrusion and left neuroforaminal disc protrusion.

## 2021-06-12 NOTE — PATIENT INSTRUCTIONS - HE
Follow-up visit in 2 weeks with Brittney Mejia CNP to discuss injection outcome and determine care plan going forward.     DISCHARGE INSTRUCTIONS    During office hours (8:00 a.m.- 4:00 p.m.) questions or concerns may be answered  by calling Spine Center Navigation Nurses at  [phone].  Messages received after hours will be returned the following business day.      In the case of an emergency, please dial 911 or seek assistance at the nearest Emergency Room/Urgent Care facility.     All Patients:    ? You may experience an increase in your symptoms for the first 2 days (It may take anywhere between 2 days- 2 weeks for the steroid to have maximum effect).    ? You may use ice on the injection site, as frequently as 20 minutes each hour if needed.    ? You may take your pain medicine.    ? You may continue taking your regular medication after your injection. If you have had a Medial Branch Block you may resume pain medication once your pain diary is completed.    ? You may shower. No swimming, tub bath or hot tub for 48 hours.  You may remove your bandaid/bandage as soon as you are home.    ? You may resume light activities, as tolerated.    ? Resume your usual diet as tolerated.    ? It is strongly advised that you do not drive for 1-3 hours post injection.    ? If you have had oral sedation:  Do not drive for 8 hours post injection.      ? If you have had IV sedation:  Do not drive for 24 hours post injection.  Do not operate hazardous machinery or make important personal/business decisions for 24 hours.      POSSIBLE STEROID SIDE EFFECTS (If steroid/cortisone was used for your procedure)    -If you experience these symptoms, it should only last for a short period      Swelling of the legs                Skin redness (flushing)       Mouth (oral) irritation     Blood sugar (glucose) levels              Sweats                      Mood changes    Headache    Sleeplessness         POSSIBLE PROCEDURE SIDE  EFFECTS  -Call the Spine Center if you are concerned    Increased Pain             Increased numbness/tingling        Nausea/Vomiting            Bruising/bleeding at site        Redness or swelling                                                Difficulty walking        Weakness             Fever greater than 100.5    *In the event of a severe headache after an epidural steroid injection that is relieved by lying down, please call the [facility] to speak with a clinical staff member*

## 2021-06-12 NOTE — PATIENT INSTRUCTIONS - HE
~Please call our [facility] Nurse Navigation line [phone] with any questions or concerns about your treatment plan, if symptoms worsen and you would like to be seen urgently, or if you have problems controlling bladder and bowel function.      You have been referred for Physical Therapy to [facility] for lumbar MedX program. They will call you to schedule an appointment.   Discussed the importance of core strengthening, ROM, stretching exercises and how each of these entities is important in decreasing pain and improving long term spine health.  The purpose of physical therapy is to teach you an individualized home exercise program.  These exercises need to be performed every day in order to decrease pain and prevent future occurrences of pain.          [facility] location:  Spine Center - [phone]  [address] [place] [id number]

## 2021-06-12 NOTE — PROGRESS NOTES
Assessment:     Diagnoses and all orders for this visit:    Chronic left-sided low back pain without sciatica    Protrusion of lumbar intervertebral disc    Stenosis of lateral recess of lumbar spine       Grady YENI Koo is a 35 y.o. y.o. male with past medical history significant for anxiety, ADHD, familial hypercholesterolemia, history of bilateral carpal tunnel release surgery who presents today for follow-up regarding:    -Acute on chronic midline low back pain slightly off to the left at the L4-5 level with intermittent left lower extremity paresthesias.  MRI of 2019 showed degenerative changes with protrusion at L4-5 on the left with left subarticular recess stenosis but no high-grade foraminal stenosis.    -Left lateral tilt of the thoracic spine on standing exam which could be related to muscle spasm/tightness, no clear scoliosis noted but this cannot ruled out at this time.  Patient does feel that this is muscular because of his pain at this time.     Plan:     A shared decision making plan was used. The patient's values and choices were respected. Prior medical records from 9/19/2019 were reviewed today. The following represents what was discussed and decided upon by the provider and the patient.        -DIAGNOSTIC TESTS: Images were personally reviewed and interpreted.   --Consider thoracic imaging if lateral shift does not improve in the thoracic region and shoulders.  --Discussed if he does not get further relief with conservative treatments would recommend updated imaging however currently he is neurologically intact on exam and his symptoms are chronic and recurrent therefore we can hold off.  Patient agrees with this plan.  --Lumbar spine MRI 9/17/2019 shows L4-5 left disc protrusion with mild spinal canal and left greater than right subarticular stenosis, no foraminal stenosis at any level.    -INTERVENTIONS: Could consider further injections however awaiting what injection was completed in July  as he got minimal lasting benefit.  We could potentially consider left L4-5 TFESI versus bilateral L4-5 TFESI depending on what was done.  Patient is hoping for another injection to calm down symptoms as he does not feel he would tolerate physical therapy at this time.    -MEDICATIONS: Encourage patient continue naproxen as needed as well as Flexeril.  He has trialed multiple medications in the past including multiple muscle relaxant and gabapentin and does not like the effects of these medications and got minimal relief therefore is not interested in further medication options at this time.  Discussed side effects of medications and proper use. Patient verbalized understanding.    -PHYSICAL THERAPY: Did discuss that we would highly recommend physical therapy at some point however he wants to obtain an injection first as he does not feel he would tolerate physical therapy.  He is done PT in the past but years ago and would likely benefit from reestablishing home exercises once tolerable.  If symptoms improve he may be a good candidate for lumbar MedX program but currently would not tolerate this intensive core strengthening program.  Discussed the importance of core strengthening, ROM, stretching exercises with the patient and how each of these entities is important in decreasing pain.  Explained to the patient that the purpose of physical therapy is to teach the patient a home exercise program.  These exercises need to be performed every day in order to decrease pain and prevent future occurrences of pain.        -PATIENT EDUCATION:  20 minutes of total visit time was spent face to face with the patient today, 60 % of the visit was spent on counseling, education, and coordinating care.   -5 minutes spent outside of visit time, non-face-to-face time, reviewing chart.  -Today we also discussed the issues related to the current COVID-19 pandemic, the pros and cons of the current treatment plan, the CDC guidelines such  as social distancing, washing the hands, and covering the cough.    -FOLLOW UP: Awaiting records to see what injection was completed in July, then we can call patient and discuss other options which he prefers.  Advised patient to notify us if he has not heard anything in the next week and we can resubmit for records.  Advised to contact clinic if symptoms worsen or change.    Subjective:     Grady Koo is a 35 y.o. male who presents today for follow-up regarding ongoing intermittent chronic low back pain that is been more constant in the last couple of years typically worse on the left at the belt line with intermittent numbness and tingling into his lower extremities specifically the left lateral thigh and posterior thigh and the bottoms of his feet bilaterally.  He does report that his back pain is his biggest concern currently a 6/1029 at its worst, a 4 at its best in any type of movement is aggravating as well as position changes at nighttime with sleeping and driving, sitting does give him some benefit.  Patient denies any lower extremity weakness or episodes of his leg giving out on him but he does report there is been a couple of times where his back pain is been severe and then he has felt his leg buckle a bit.  Otherwise he denies any weakness where his feet are flopping when he walks or catching his  toes.  Patient denies bowel or bladder loss control, denies saddle anesthesia.     -Treatment to Date: No prior spinal surgery.  No prior physical therapy.     Lumbar Inj 2006 with resolution pain.  Lumbar CHRISTIANO July 2020 in [place], awaiting records for specific level and laterality completed.     -Medications:  Flexeril 10 mg    Past medications:  Gabapentin 300 mg with no relief and significant side effects    Patient Active Problem List   Diagnosis     ADHD (attention deficit hyperactivity disorder)     Anxiety     Familial hypercholesterolemia       Current Outpatient Medications on File Prior to  Visit   Medication Sig Dispense Refill     buPROPion (WELLBUTRIN XL) 300 MG 24 hr tablet Take 1 tablet (300 mg total) by mouth daily. 90 tablet 3     cyclobenzaprine (FLEXERIL) 10 MG tablet Take 1 tablet (10 mg total) by mouth 3 (three) times a day as needed for muscle spasms. 30 tablet 1     dextroamphetamine-amphetamine (ADDERALL XR) 15 MG 24 hr capsule Take 1 capsule (15 mg total) by mouth daily. 30 capsule 0     dextroamphetamine-amphetamine (ADDERALL) 10 mg Tab tablet Take 1 tablet by mouth daily. In afternoon 30 tablet 0     gabapentin (NEURONTIN) 300 MG capsule Take 1 capsule (300 mg total) by mouth 3 (three) times a day. Follow Gabapentin Dosing chart given 90 capsule 3     naproxen (NAPROSYN) 500 MG tablet Take 1 tablet (500 mg total) by mouth 3 (three) times a day as needed. 42 tablet 1     No current facility-administered medications on file prior to visit.        Allergies   Allergen Reactions     Morphine Nausea And Vomiting     Penicillins        Past Medical History:   Diagnosis Date     ADHD (attention deficit hyperactivity disorder) 9/23/2016     Anxiety 9/23/2016        Review of Systems  ROS: Positive for numbness and tingling and weakness. Specifically negative for bowel/bladder dysfunction, balance changes, headache, dizziness, foot drop, fevers, chills, appetite changes, nausea/vomiting, unexplained weight loss. Otherwise 13 systems reviewed are negative. Please see the patient's intake questionnaire from today for details.    Reviewed Social, Family, Past Medical and Past Surgical history with patient, no significant changes noted since prior visit.     Objective:     There were no vitals taken for this visit.    PHYSICAL EXAMINATION:    --CONSTITUTIONAL: Well developed, well nourished, healthy appearing individual.  --PSYCHIATRIC: Appropriate mood and affect. No difficulty interacting due to temper, social withdrawal, or memory issues.  --SKIN: Lumbar region is dry and intact.    --RESPIRATORY: Normal rhythm and effort. No abnormal accessory muscle breathing patterns noted.   --MUSCULOSKELETAL:  Normal lumbar lordosis noted, no lateral shift.  --GROSS MOTOR: Easily arises from a seated position. Gait is non-antalgic  --LUMBAR SPINE:  Inspection reveals left lateral shift of the thoracic spine and shoulders. Range of motion is limited in all movements: Lumbar flexion, extension, lateral rotation. No tenderness to palpation lumbar spine. Straight leg raising in the seated position is negative to radicular pain. Sciatic notch non-tender.   --SACROILIAC JOINT:  One Finger point test negative.  --LOWER EXTREMITY MOTOR TESTING:  Plantar flexion left 5/5, right 5/5   Dorsiflexion left 5/5, right 5/5   Great toe MTP extension left 5/5, right 5/5  Knee flexion left 5/5, right 5/5  Knee extension left 5/5, right 5/5   Hip flexion left 5/5, right 5/5  Hip abduction left 5/5, right 5/5  Hip adduction left 5/5, right 5/5   --HIPS: Full range of motion bilaterally.    --NEUROLOGIC: Bilateral patellar and achilles reflexes are physiologic and symmetric. Sensation to light touch is intact in the bilateral L4, L5, and S1 dermatomes.    RESULTS:   Imaging: Lumbar spine imaging was reviewed today. The images were shown to the patient and the findings were explained using a spine model.      Xr Lumbar Spine 2 Or 3 Vws  Result Date: 9/9/2019  EXAM: XR LUMBAR SPINE 2 OR 3 VWS LOCATION: [facility] DATE/TIME: 9/6/2019 4:21 PM INDICATION: Low back pain COMPARISON: None.   5 nonrib-bearing lumbar type vertebral bodies. Lumbar spine lordosis is maintained. Vertebral body heights are maintained. Lateral alignment is anatomic. Intervertebral disc space heights are maintained.        Mr Lumbar Spine Without Contrast  Result Date: 9/18/2019  INDICATION: Back pain or radiculopathy, > 6 weeks. Back pain or radiculopathy. COMPARISON: 09/06/2019 radiograph. TECHNIQUE: Without IV contrast FINDINGS: Nomenclature is based  on five lumbar-type vertebral bodies. Vertebral body heights are maintained. Developing Schmorl's node along the inferior endplate of L4 with reactive marrow edema. The conus tip is identified at T12-L1. Cauda equina nerve roots are  unremarkable. Posterior paraspinal soft tissues are within normal limits.   T12-L1: Normal disc height and signal. No herniation. No facet arthropathy. No spinal canal stenosis. No right neural foraminal stenosis. No left neural foraminal stenosis.   L1-L2: Normal disc height and signal. No herniation. No facet arthropathy. No spinal canal stenosis. No right neural foraminal stenosis. No left neural foraminal stenosis.   L2-L3: Normal disc height and signal. No herniation. No facet arthropathy. No spinal canal stenosis. No right neural foraminal stenosis. No left neural foraminal stenosis.   L3-L4: Mild diffuse disc bulge. No facet arthropathy. No significant spinal canal stenosis. No right neural foraminal stenosis. No left neural foraminal stenosis.   L4-L5: Central/left paracentral disc protrusion. This results in mild spinal canal and left greater than right subarticular zone stenosis. No significant foraminal narrowing. No facet arthropathy.   L5-S1: Normal disc height and signal. No herniation. No facet arthropathy. No spinal canal stenosis. No right neural foraminal stenosis. No left neural foraminal stenosis.   1.  At L4-L5, there is a central/left paracentral disc protrusion which results in mild spinal canal and left greater than right subarticular zone stenosis.   2.  No high-grade foraminal narrowing.   3.  Developing Schmorl's node along the inferior endplate of L4 with marrow edema.        Lumbar spine x-ray 7/22/2020 [facility]  Transitional vertebra lumbar  thoracolumbar junction.  Mild list to the left.  Some sclerosis of the sacroiliac joints is noted.  Lateral x-ray shows flattening of the lumbar spine lordosis.  Irregularity at the L4-5 disc space involving  superior endplate of L5.  Left oblique view shows some narrowing of the L4-5 facet.  No evidence of spondylolysis.  Right oblique view shows some narrowing of the L4-5 facet.  Facet arthropathy L5-S1.      Lumbar spine MRI 2005-[facility]  Impression: Degenerative disc findings at L4-5 with posterior annular tear at L4-5, small central disc protrusion and left neuroforaminal disc protrusion.

## 2021-06-12 NOTE — TELEPHONE ENCOUNTER
Controlled Substance Refill Request  Medication Name:   Requested Prescriptions     Pending Prescriptions Disp Refills     dextroamphetamine-amphetamine (ADDERALL) 10 mg Tab tablet 30 tablet 0     Sig: Take 1 tablet by mouth daily. In afternoon     dextroamphetamine-amphetamine (ADDERALL XR) 15 MG 24 hr capsule 30 capsule 0     Sig: Take 1 capsule (15 mg total) by mouth daily.     Date Last Fill: 8/31/20  [facility]ed [facility]: [facility]  Submit electronically to pharmacy  Controlled Substance Agreement on file:   Encounter-Level CSA Scan Date:    There are no encounter-level csa scan date.        Last office visit:  9/6/19

## 2021-06-12 NOTE — PROGRESS NOTES
Assessment/Plan:     Patient presents today for routine physical examination.    Healthcare Maintenance: USPSTF recommendations for age 35;  Patient has been counseled on/screened for:  - intimate partner violence and there are no concerns at this time  - a healthful diet and physical activity for CVD prevention  - Diabetes and hyperlipidemia: screening was performed.   Sexually transmitted infections: Patient would not like to be screened for chlamydia, gonorrhea, syphilis, HIV, and hepatitis  Immunizations: up to date except for influenza which was recommended      Additional concerns are as detailed below:    1. Chronic midline low back pain, unspecified whether sciatica present  Followed by orthopedics.  Recently had an injection and is hopeful that it will improve quality of life.    2.  Anxiety  Patient was feeling slightly flat and would like to trial a lower dose to see if he notes any improvement.  - buPROPion (WELLBUTRIN XL) 150 MG 24 hr tablet; Take 1 tablet (150 mg total) by mouth daily.  Dispense: 90 tablet; Refill: 3      AVS printed and given to patient.  Return to clinic in 6 months.    I have had an Advance Directives discussion with the patient.    This note has been dictated using voice recognition software. Any grammatical or context distortions are unintentional and inherent to the the software.     Kristen Royal MD  Family Medicine [facility]    Subjective:     Grady Koo is a 35 y.o. male who presents to clinic for routine physical.    Additional concerns include:    1.  Patient is doing well with his ADHD medication.  He does not feel that the dose needs to be adjusted at all.  He never requests early refills and has been doing full and coming in when requested.    PHQ-2 Score:  0    Health Care Directive: discussed    Patient Care Team:   PCP: Kristen Royal     Past Medical History, Family History, and Social History reviewed.     Review of systems:  Patient endorses:  "back pain  The remainder of the 10 system review is otherwise negative.    Objective:     /88   Pulse 66   Ht 6' 1\" (1.854 m)   Wt 195 lb (88.5 kg)   SpO2 98%   BMI 25.73 kg/m    Gen: Alert, NAD, appears stated age, normal hygiene   Eyes: conjunctivae without injection, sclera clear, EOMI  ENT/mouth: nares clear, septum midline, absent rhinorrhea,absent pharyngeal injection, neck is supple, no thyroid enlargement  CV: RRR, no murmur appreciated, pedal edema absent bilaterally  Resp: CTAB, no wheezes, rales or ronchi  ABD: normoactive, non-tender to palpation, nondistended  MSK: grossly full range of motion in all joints, no obvious deformity  Neuro: CN II-XII grossly intact, no deficits in coordination  Psych: no apparent hallucinations or delusions, no pressured speech; alert, oriented x3  SKIN: dry and without lesions  Heme/lymph: no pallor, no active bleeding/bruising, no adenopathy appreciated    Medications:  Current Outpatient Medications   Medication Sig     cyclobenzaprine (FLEXERIL) 10 MG tablet Take 1 tablet (10 mg total) by mouth 3 (three) times a day as needed for muscle spasms.     dextroamphetamine-amphetamine (ADDERALL XR) 15 MG 24 hr capsule Take 1 capsule (15 mg total) by mouth daily.     dextroamphetamine-amphetamine (ADDERALL) 10 mg Tab tablet Take 1 tablet by mouth daily. In afternoon     buPROPion (WELLBUTRIN XL) 150 MG 24 hr tablet Take 1 tablet (150 mg total) by mouth daily.       Allergies:  Allergies   Allergen Reactions     Morphine Nausea And Vomiting     Penicillins        PMH:  Past Medical History:   Diagnosis Date     ADHD (attention deficit hyperactivity disorder) 9/23/2016     Anxiety 9/23/2016       PSH:  Past Surgical History:   Procedure Laterality Date     BICEPS TENDON REPAIR Right      CYST REMOVAL Left     behind left eye       Family Hx:  Family History   Problem Relation Age of Onset     Hyperlipidemia Mother      Hyperlipidemia Father      Cancer Neg Hx      " Heart disease Neg Hx        Social History:  Social History     Socioeconomic History     Marital status:      Spouse name: Not on file     Number of children: Not on file     Years of education: Not on file     Highest education level: Not on file   Occupational History     Not on file   Social Needs     Financial resource strain: Not on file     Food insecurity     Worry: Not on file     Inability: Not on file     Transportation needs     Medical: Not on file     Non-medical: Not on file   Tobacco Use     Smoking status: Never Smoker     Smokeless tobacco: Never Used   Substance and Sexual Activity     Alcohol use: Yes     Comment: social 2-3 times a week     Drug use: No     Sexual activity: Yes     Partners: Female   Lifestyle     Physical activity     Days per week: Not on file     Minutes per session: Not on file     Stress: Not on file   Relationships     Social connections     Talks on phone: Not on file     Gets together: Not on file     Attends Mosque service: Not on file     Active member of club or organization: Not on file     Attends meetings of clubs or organizations: Not on file     Relationship status: Not on file     Intimate partner violence     Fear of current or ex partner: Not on file     Emotionally abused: Not on file     Physically abused: Not on file     Forced sexual activity: Not on file   Other Topics Concern     Not on file   Social History Narrative     Not on file       LDL Calculated (mg/dL)   Date Value   09/06/2019 177 (H)   04/27/2018 182 (H)   10/26/2017 206 (H)        Immunization History   Administered Date(s) Administered     Influenza, inj, historic,unspecified 10/26/2017     Influenza,seasonal,quad inj =/> 6months 10/26/2017, 11/20/2018, 09/06/2019     Tdap 10/26/2017     There are no preventive care reminders to display for this patient.

## 2021-06-12 NOTE — PATIENT INSTRUCTIONS - HE

## 2021-06-12 NOTE — TELEPHONE ENCOUNTER
----- Message from Brittney eMjia CNP sent at 10/8/2020  4:52 PM CDT -----  Regarding: Injection order  Please call patient and notify him that I did get the information on his previous injections that was completed 7/22/2020.  It does look like they did a bilateral facet joint steroid injection at L4-5 as well as a left L5-S1 epidural steroid injection all at the same time.  We do not typically do these injections at the same time. We could trial a left L4-5 transforaminal epidural steroid injection to target the disc bulge at this level and lateral recess stenosis.  An order was placed and he can schedule this injection with Dr. Schwartz if you would like.  We did discuss this in clinic previously.  Please remind him of the flu shot specifics for injections as well.  Thanks,  Brittney   ----- Message -----  From: Brittney Mejia CNP  Sent: 10/7/2020  12:20 PM CDT  To: Brittney Mejia CNP    Awaiting Injection level completed

## 2021-06-12 NOTE — TELEPHONE ENCOUNTER
Call to pt with treatment plan recommendations. Pt stated understanding and would like to proceed with the ordered injection. Did review flu shot specifics for injections. Pt was planning on getting his flu shot at his appt with his PCP on 10/21, however, he states he will do his flu shot at a later time.     Pt was transferred to scheduling to make appt.

## 2021-06-13 NOTE — PROGRESS NOTES
[facility] Daily Progress     Patient Name: Grady Koo  Today's Date: 12/18/2020  Visit #: 8/16  Date of evaluation: 11/13/2020  Referral Diagnosis: Chronic left-sided low back pain without sciatica  Referring provider: Brittney Mejia CNP  Visit Diagnosis:     ICD-10-CM    1. Chronic left-sided low back pain without sciatica  M54.5     G89.29    2. Protrusion of intervertebral disc of lumbosacral region  M51.27    3. Generalized muscle weakness  M62.81        From Evaluation:  Grady is a 35 y.o. male who presents to therapy today with complaints of ongoing intermittent chronic bilateral low back pain that have been worse in the last couple of years on the left lumbosacral junction where he was having numbness and tingling down the left lower extremity as well.  He had gotten good relief with left L4-5 TFESI at the end of October with Dr. Schwartz but over the past week the symptoms have slowly returned  Assessment:     Patient is in his third week of MedX and he didn't feel he has made much progress, though with the two traction sessions, he thinks there may be some slight improvement. So, We discussed continuing with PT (traction and medX) longer as well as him getting an injection next week.       Goal Status:  Pt. will demonstrate/verbalize independence in self-management of condition in : 12 weeks  Pt. will be independent with home exercise program in : 12 weeks    Pt will: Able to roll in bed and not wake due to pain and able to get out of bed in AM with pain 0-2/10 within 16 visits  Pt will: Able to get up from floor with more ease as strength improves and pain levels decrease 0-2/10 within 16 visits      Plan / Patient Education:       Continue per POC Patient agrees with plan.    Continue with PT - traction and medX combo.    Injection on 12-      Subjective:     Pain Rating: 3    The traction might be making a bit of a difference, has had a little more good days. He  "is going to get an injection next week.     Objective:     Less of a shift noted today in clinic.     Treatment Today     Enter Week / Visit #: Wk 3 V 2  Weight (lbs): 81#  Reps (#): 30  ROM (degrees): 0-33    Exercises:  Exercise #1: current stretches:  LTR (tries to \"pop\" back), hamstring     Anything to take tension off left side  Comment #1: sidelying positional traction, standing sideglide, sideglide with extension, lateral shift correction in standing  Exercise #2: stretches:  sitting hamstring, SKC, LTR, piriformis below 90 degrees   hold 30 seconds X 1-3 reps  Comment #2: treadmill for warm up    5 minutes  Exercise #3: supine LE nerve glide   repeat 12-15 reps    3-5X/day  Comment #3: rotary torso   90 seconds, L,   34#  Exercise #4: prone lying 1 minute - added to HEP  Comment #4: prone on elbows - repeated 5x added to HEP, 2 times with PT pressure on SIJ regiong to correct shift      TREATMENT MINUTES COMMENTS   Evaluation     Self-care/ Home management     Manual therapy Not today Sacral offloading in prone.   Manual double leg traction and left unilateral traction with a bias to the right in supine.   Neuromuscular Re-education     Therapeutic Activity     Therapeutic Exercises 10 See flow sheet or above  [id number]   Gait training     Modality__________________ 23 Set up and take down time included.       Supine  15 degree angle  100# max weight  60 sec on, 10 sec off  15 minutes  3 min ramp up and 3 min ramp down              Total 33    Blank areas are intentional and mean the treatment did not include these items.       Liane Leach, PT  12/18/2020  "

## 2021-06-13 NOTE — PROGRESS NOTES
[facility] Daily Progress     Patient Name: Grady Koo  Today's Date: 11/27/2020  Visit #: 4/16  Date of evaluation: 11/13/2020  Referral Diagnosis: Chronic left-sided low back pain without sciatica  Referring provider: Brittney Mejia CNP  Visit Diagnosis:     ICD-10-CM    1. Chronic left-sided low back pain without sciatica  M54.5     G89.29    2. Protrusion of intervertebral disc of lumbosacral region  M51.27    3. Generalized muscle weakness  M62.81        From Evaluation:  Grady is a 35 y.o. male who presents to therapy today with complaints of ongoing intermittent chronic bilateral low back pain that have been worse in the last couple of years on the left lumbosacral junction where he was having numbness and tingling down the left lower extremity as well.  He had gotten good relief with left L4-5 TFESI at the end of October with Dr. Schwartz but over the past week the symptoms have slowly returned  Assessment:     Patient felt the manual traction done in clinic today was helpful, he felt neutral (not shifted) and looser following. We will continue to do this. We have decided on manual traction vs. Mechanical d/t focusing on the bias to the right to help correct his shift and so that it can be gentle. He has done his inversion table in the past and it seems to bring him out of the pull too quickly and he is worse following. He feels the glides have helped to manage his symptoms.   He is doing prone press ups at home now and tolerating well. He was encouraged to do them more frequently throughout the day.    He continues with all other exercises.     We will continue per POC and assess the first week of Dec for progress. He was encouraged to make a f/u with referring provider in the next 2-3 weeks to check back in with progress. The last time he saw her, he was doing well s/p injection, but it didn't last long.     Patient is appropriate to continue with skilled physical therapy  "intervention, as indicated by initial plan of care.    Goal Status:  Pt. will demonstrate/verbalize independence in self-management of condition in : 12 weeks  Pt. will be independent with home exercise program in : 12 weeks    Pt will: Able to roll in bed and not wake due to pain and able to get out of bed in AM with pain 0-2/10 within 16 visits  Pt will: Able to get up from floor with more ease as strength improves and pain levels decrease 0-2/10 within 16 visits      Plan / Patient Education:     review stretches prn  Lumbar ext and rotary torso Med X exercise   Continue to trail manual traction in session. Did discuss with patient that we can trial the mechanical traction at somepoint, but he has rebound pain after inversion table and even a little after manual traction, so this way we can keep it gentle for a few more session instead of doing mechanical.       Subjective:     Pain Rating: 3-4  Patient is shifted to left today    The stretches are helpful to decrease the symptoms.    Mornings remain painful.      Objective:     Patient Active Problem List   Diagnosis     ADHD (attention deficit hyperactivity disorder)     Anxiety     Familial hypercholesterolemia     Chronic back pain     Treatment Today     Enter Week / Visit #: Wk 2 V 2  Weight (lbs): 73#  Reps (#): 30  ROM (degrees): 0-33    Exercises:  Exercise #1: current stretches:  LTR (tries to \"pop\" back), hamstring     Anything to take tension off left side  Comment #1: sidelying positional traction, standing sideglide, sideglide with extension, lateral shift correction in standing  Exercise #2: stretches:  sitting hamstring, SKC, LTR, piriformis below 90 degrees   hold 30 seconds X 1-3 reps  Comment #2: treadmill for warm up    5 minutes  Exercise #3: supine LE nerve glide   repeat 12-15 reps    3-5X/day  Comment #3: rotary torso   90 seconds, R,   30#  Exercise #4: prone lying 1 minute - added to HEP  Comment #4: prone on elbows - repeated 5x added " to HEP, 2 times with PT pressure on SIJ regiong to correct shift      TREATMENT MINUTES COMMENTS   Evaluation     Self-care/ Home management     Manual therapy 15 Sacral offloading in prone.   Manual double leg traction and left unilateral traction with a bias to the right in supine.   Neuromuscular Re-education     Therapeutic Activity     Therapeutic Exercises 15 See flow sheet or above  [id number]   Gait training     Modality__________________                Total 30    Blank areas are intentional and mean the treatment did not include these items.       Liane Leach, PT  11/27/2020

## 2021-06-13 NOTE — PROGRESS NOTES
[facility] Daily Progress     Patient Name: Grady Koo  Today's Date: 12/8/2020  Visit #: 7/16  Date of evaluation: 11/13/2020  Referral Diagnosis: Chronic left-sided low back pain without sciatica  Referring provider: Brittney Mejia CNP  Visit Diagnosis:     ICD-10-CM    1. Chronic left-sided low back pain without sciatica  M54.5     G89.29    2. Protrusion of intervertebral disc of lumbosacral region  M51.27    3. Generalized muscle weakness  M62.81        From Evaluation:  Grady is a 35 y.o. male who presents to therapy today with complaints of ongoing intermittent chronic bilateral low back pain that have been worse in the last couple of years on the left lumbosacral junction where he was having numbness and tingling down the left lower extremity as well.  He had gotten good relief with left L4-5 TFESI at the end of October with Dr. Schwartz but over the past week the symptoms have slowly returned  Assessment:     Patient is in his third week of MedX and he doesn't feel he has made much progress.  He continue to be laterally shifted and pain levels remain variable.  He had a second treatment of mechanical traction today and felt the same after the treatment.  Liked the slow release of pressure, did a couple stretches before getting up from the table and felt ok.    He will have a virtual visit with Brittney Mejia CNP tomorrow so will discuss different options and if he feels therapy is helping enough to continue.   Patient is appropriate to continue with skilled physical therapy intervention, as indicated by initial plan of care.    Goal Status:  Pt. will demonstrate/verbalize independence in self-management of condition in : 12 weeks  Pt. will be independent with home exercise program in : 12 weeks    Pt will: Able to roll in bed and not wake due to pain and able to get out of bed in AM with pain 0-2/10 within 16 visits  Pt will: Able to get up from floor with more ease as  "strength improves and pain levels decrease 0-2/10 within 16 visits      Plan / Patient Education:       Virtual visit with Brittney Mejia CNP.  Will discuss plan.  Do another round of mechanical traction next session       Subjective:     Pain Rating: 3-4  Patient is shifted to left still.    Didn't hurt as much after the traction last visit as the inversion table has in the past.    Think I got a very small relief after the traction but the muscles did tighten quickly.    This weekend was bad again but today was better than over the weekend.    Overall, hasn't noticed a huge change with PT thus far.     Objective:     Treatment Today     Enter Week / Visit #: Wk 3 V 2  Weight (lbs): 81#  Reps (#): 30  ROM (degrees): 0-33    Exercises:  Exercise #1: current stretches:  LTR (tries to \"pop\" back), hamstring     Anything to take tension off left side  Comment #1: sidelying positional traction, standing sideglide, sideglide with extension, lateral shift correction in standing  Exercise #2: stretches:  sitting hamstring, SKC, LTR, piriformis below 90 degrees   hold 30 seconds X 1-3 reps  Comment #2: treadmill for warm up    5 minutes  Exercise #3: supine LE nerve glide   repeat 12-15 reps    3-5X/day  Comment #3: rotary torso   90 seconds, L,   34#  Exercise #4: prone lying 1 minute - added to HEP  Comment #4: prone on elbows - repeated 5x added to HEP, 2 times with PT pressure on SIJ regiong to correct shift      TREATMENT MINUTES COMMENTS   Evaluation     Self-care/ Home management     Manual therapy Not today Sacral offloading in prone.   Manual double leg traction and left unilateral traction with a bias to the right in supine.   Neuromuscular Re-education     Therapeutic Activity     Therapeutic Exercises 8 See flow sheet or above  [id number]   Gait training     Modality__________________ 27 Set up and take down time included.       Supine  15 degree angle  100# max weight  60 sec on, 10 sec off  15 minutes  3 " min ramp up and 3 min ramp down              Total 35 Pt a few minutes late but kept him a few minutes over   Blank areas are intentional and mean the treatment did not include these items.       Adelina Duque, PT  12/8/2020

## 2021-06-13 NOTE — PROGRESS NOTES
Assessment/Plan:     1. Health care maintenance  Age appropriate health care screening and guidance discussed including nutrition, exercise, immunization updates and vitamin supplementation.   Screening labs and exams ordered below.   Patient declines STD screening  - Lipid [place] RANDOM  - Glucose  - Hemoglobin  - Vitamin D, Total (25-Hydroxy)  - Thyroid [place]    2. Need for immunization against influenza  Given as below  - Influenza, Seasonal,Quad Inj, 36+ MOS    3. Need for diphtheria-tetanus-pertussis (Tdap) vaccine, adult/adolescent  Given as below  - Tdap vaccine,  8yo or older,  IM    4. Attention deficit hyperactivity disorder (ADHD), unspecified ADHD type  Will increase extended release dose from 20 mg to the 25 mg dose.  Provided refill of 10 mg.  Patient will let us know how this works for him will adjust dose as needed.  If works well we will plan to send in refills.  Patient signed controlled substance agreement  - dextroamphetamine-amphetamine (ADDERALL XR) 25 MG 24 hr capsule; Take 1 capsule (25 mg total) by mouth daily.  Dispense: 30 capsule; Refill: 0  - dextroamphetamine-amphetamine (ADDERALL) 10 mg Tab tablet; TAKE 1 TABLET BY MOUTH daily in the afternoon if needed  Dispense: 30 tablet; Refill: 0    5. Anxiety  Works well for patient provided refill  - buPROPion (WELLBUTRIN XL) 300 MG 24 hr tablet; Take 1 tablet (300 mg total) by mouth daily.  Dispense: 90 tablet; Refill: 3    6. Visual changes  No significant change on vision screening.  I did recommend that he may want to visit with ophthalmologist or optometrist for further evaluation.  May want to get special glasses for looking at computers at long periods of time or take breaks when looking at the computer for long period of time.     Visual Acuity Screening    Right eye Left eye Both eyes   Without correction: 20/20 20/25 20/20-1   With correction:              Subjective:      Grady Koo is a 32 y.o. male in today for annual  physical and for medication refill.  He feels well overall.  He has been taking Wellbutrin for anxiety it works very well for him.  He also has been on Adderall for some time for ADHD.  He states that he has been on several doses with the last few years.  He states that previously when he thought a dose was not working well he did increase but the jump was too big and he had side effects.  He is noticing that his extended-release Adderall is wearing off sooner in the day and he is having to take the 10 mg dose around noon.  When he previously tried 30 mg it was too strong but he feels 20 is not enough.  Wonders if he could try 25.  He is sleeping overall well.  Vitals are stable.  No significant fluctuation in weight.  He would like to get labs.  It has been over year since last labs states that in the past he did have some elevated cholesterol he takes fish oil.  Father also has history of high cholesterol but no other significant cardiovascular concerns.  She states he is very active works out 1-2 times a week and states he feels he eats fairly well.  He also noticed some changes with his vision he thinks that it may be due to the computer and that his distance vision is getting worse he has not recently seen optometrist.  He feels he is okay when he is reading up close.  He states he feels like his eye is straining towards the end of the day.    Current Outpatient Prescriptions   Medication Sig Dispense Refill     buPROPion (WELLBUTRIN XL) 300 MG 24 hr tablet Take 1 tablet (300 mg total) by mouth daily. 90 tablet 3     dextroamphetamine-amphetamine (ADDERALL) 10 mg Tab tablet TAKE 1 TABLET BY MOUTH daily in the afternoon if needed 30 tablet 0     dextroamphetamine-amphetamine (ADDERALL XR) 25 MG 24 hr capsule Take 1 capsule (25 mg total) by mouth daily. 30 capsule 0     No current facility-administered medications for this visit.      Allergies   Allergen Reactions     Penicillins      Past Medical History,  Family History, and Social History reviewed.  Past Medical History:   Diagnosis Date     ADHD (attention deficit hyperactivity disorder) 9/23/2016     Anxiety 9/23/2016     Past Surgical History:   Procedure Laterality Date     BICEPS TENDON REPAIR Right      Penicillins  Family History   Problem Relation Age of Onset     No Medical Problems Mother      Hyperlipidemia Father      Cancer Neg Hx      Heart disease Neg Hx      Social History     Social History     Marital status:      Spouse name: N/A     Number of children: N/A     Years of education: N/A     Occupational History     Not on file.     Social History Main Topics     Smoking status: Never Smoker     Smokeless tobacco: Never Used     Alcohol use Yes      Comment: social 2-3 times a week     Drug use: No     Sexual activity: Yes     Partners: Female     Other Topics Concern     Not on file     Social History Narrative         Review of systems is as stated in HPI, and the remainder of the 10 system review is otherwise negative.    Objective:     Vitals:    10/26/17 1126   BP: 120/80   Patient Site: Right Arm   Patient Position: Standing   Cuff Size: Adult Large   Pulse: 65   Temp: 97.8  F (36.6  C)   TempSrc: Tympanic   SpO2: 99%   Weight: 195 lb 2 oz (88.5 kg)    Body mass index is 26.1 kg/(m^2).  Wt Readings from Last 3 Encounters:   10/26/17 195 lb 2 oz (88.5 kg)   04/04/17 197 lb (89.4 kg)   10/10/16 185 lb (83.9 kg)       General Appearance:    Alert, cooperative, no distress, appears stated age    Head:    Normocephalic, without obvious abnormality, atraumatic   Eyes:    PERRL, EOM's intact, no conjunctivitis    Ears:    Normal TM's and external ear canals   Nose:   Mucosa normal, no drainage     or sinus tenderness   Throat:   Oropharynx is clear   Neck:   Supple, symmetrical, no adenopathy, no thyromegally, no carotid bruit        Lungs:     Clear to auscultation bilaterally, respirations unlabored   Chest Wall:    No tenderness or deformity     Heart:    Regular rate and rhythm, S1 and S2 normal, no murmur, rub    or gallop       Abdomen:     Soft, non-tender, bowel sounds active all four quadrants,     no masses, no organomegaly           Extremities:   Extremities normal, atraumatic, no cyanosis or edema   Pulses:   2+ and symmetric all extremities   Neuro:   cranial nerves grossly intact, grossly normal sensation and reflexes in extremities    Psych:   grossly normal mood and affect without acute anxiety or psychosis    Skin:   No rashes or lesions   :          This note has been dictated using voice recognition software. Any grammatical or context distortions are unintentional and inherent to the software.

## 2021-06-13 NOTE — PROGRESS NOTES
[facility]  Lumbo-Pelvic Initial Evaluation    Patient Name: Grady Koo  Date of evaluation: 11/13/2020  Referral Diagnosis: Chronic left-sided low back pain without sciatica  Referring provider: Brittney Mejia C*  Visit Diagnosis:     ICD-10-CM    1. Chronic left-sided low back pain without sciatica  M54.5     G89.29    2. Protrusion of intervertebral disc of lumbosacral region  M51.27    3. Generalized muscle weakness  M62.81        Assessment:       Patient presents with left sided chronic low back pain and disc protrusion which symptoms decreased after an injection in October 2020 but within the past week the symptoms have all returned. He does use a TENS unit and in the past has tried to use an inversion table to symptoms increased trying to get out of the machine.   He demonstrates left trunk lateral shift, decreased trunk AROM, pain to palpation + slump test.  He is unable to perform RANCHO due to pain levels.  Functional limitations are described as occurring with: rolling in bed, getting out of bed, getting off floor, twisting and long distance driving.  Pt. is appropriate and a good candidate for skilled PT intervention as outlined in the Plan of Care (POC) to improve pain levels and function.    Goals:  Pt. will demonstrate/verbalize independence in self-management of condition in : 12 weeks  Pt. will be independent with home exercise program in : 12 weeks    Pt will: Able to roll in bed and not wake due to pain and able to get out of bed in AM with pain 0-2/10 within 16 visits  Pt will: Able to get up from floor with more ease as strength improves and pain levels decrease 0-2/10 within 16 visits      Patient's expectations/goals are realistic.    Barriers to Learning or Achieving Goals:  Patient Active Problem List   Diagnosis     ADHD (attention deficit hyperactivity disorder)     Anxiety     Familial hypercholesterolemia     Chronic back pain       POC, goals and  pathology of condition were reviewed with the patient.  Patient is in agreement with the POC.       Plan / Patient Instructions:        Plan of Care:   Authorization / Certification Start Date: 20  Authorization / Certification Number of Visits: 16  Communication with: Referral Source  Patient Related Instruction: Nature of Condition;Treatment plan and rationale;Self Care instruction;Basis of treatment;Body mechanics;Posture  Times per Week: 2  Number of Visits: 16  Therapeutic Exercise: ROM;Stretching  Neuromuscular Reeducation: kinesio tape;posture;core  Modalities: traction;TENS      Plan for next visit:   Review side glides  Intro LE and back stretches  Lumbar ext and rotary torso Med X exercise  Trial traction       Subjective:       History of Present Illness:    Grady is a 35 y.o. male who presents to therapy today with complaints of ongoing intermittent chronic bilateral low back pain that have been worse in the last couple of years on the left lumbosacral junction where he was having numbness and tingling down the left lower extremity as well.  He had gotten good relief with left L4-5 TFESI at the end of October with Dr. Schwartz but over the past week the symptoms have slowly returned.  He feels the symptoms are back to what they were prior to the injection with pain mainly on the left and more tingling into his foot when his back spasms or is tight.  Symptoms are constant and getting worse.     For about a year he did see a chiropractor, ending this past August, but stopped going due to lack of progress. Has an inversion table, used it a few times and had pain getting out of the machine each time.  No longer uses it.  He does have a TENS unit which he does use for pain control at times.    Pain Ratin  Pain rating at best: 2  Pain rating at worst: 8  Pain description: aching, sharp, shooting, tingling and weakness    Functional limitations are described as occurring with:   rolling in bed,  getting out of bed, getting off floor, twisting, driving long distances    Patient reports benefit from:  sit, rest         Objective:      Note: Items left blank indicates the item was not performed or not indicated at the time of the evaluation.    Patient Outcome Measures :        Examination  1. Chronic left-sided low back pain without sciatica     2. Protrusion of intervertebral disc of lumbosacral region     3. Generalized muscle weakness       Involved side: Left  Posture Observation:            Left lateral shift    Lumbar ROM:         Within Normal Limits unless noted  Date: 11/13/20     *Indicate scale AROM AROM AROM   Lumbar Flexion Hands to knees, leans to left, Pain     Lumbar Extension Unable,  pain      Right Left Right Left Right Left   Lumbar Sidebending 50% limited, pain on left Pain, unable       Lumbar Rotation         Thoracic Flexion      Thoracic Extension      Thoracic Sidebending         Thoracic Rotation           Lower Extremity Strength:     Date: 11/13/20     LE strength/5 Right Left Right Left Right Left   Hip Flexion (L1-3) 5 5       Hip Extension (L5-S1)         Hip Abduction (L4-5) 5 5       Hip Adduction (L2-3) 5 5       Hip External Rotation         Hip Internal Rotation         Knee Extension (L3-4) 5 5       Knee Flexion 5 5       Ankle Dorsiflexion (L4-5)         Great Toe Extension (L5)         Ankle Plantar flexion (S1)         Abdominals        Sensation           Reflex Testing  Lumbar Dermatomes Right Left UE Reflexes Right Left   Iliac Crest and Groin (L1)   Biceps (C5-6)     Anterior Medial Thigh (L2)   Brachioradialis (C5-6)     Anterior Thigh, Medial Epicondyle Femur (L3)   Triceps (C7-8)     Lateral Thigh, Anterior Knee, Medial Leg/Malleolus (L4)   Rohan s test     Lateral Leg, Dorsal Foot (L5)   LE Reflexes     Lateral Foot (S1)   Patellar (L3-4)     Posterior Leg (S2)   Achilles (S1-2)     Other:   Babinski Response       Palpation: slight discomfort left lumbar  "paraspinals  Bridging: pain  Able to toe and heel walk    Lumbar Special Tests:     Lumbar Special Tests Right Left SI Tests Right  Left   Quadrant test   SI Compression     Straight leg raise   SI Distraction     Crossover response   POSH Test     Slump  Pain in back while slumping Sacral Thrust     Sit-up test  FADIR     Trunk extensor endurance test  Resisted Abduction     Prone instability test  Other:     Pubic shotgun  Other:       Unable to get into prone on elbows due to pain    LE Screen:  hamsting length normal    Treatment Today     Enter Week / Visit #: W1V1  Weight (lbs): 65#  Reps (#): 17, stopped due to pain starting  ROM (degrees): 0-33      Exercises:  Exercise #1: current stretches:  LTR (tries to \"pop\" back), hamstring     Anything to take tension off left side  Comment #1: sidelying positional traction, standing sideglide, sideglide with extension, lateral shift correction in standing        TREATMENT MINUTES COMMENTS   Evaluation 30 lumbar   Self-care/ Home management     Manual therapy     Neuromuscular Re-education     Therapeutic Activity     Therapeutic Exercises 25 See flow sheet or above  [id number]   Gait training     Modality__________________                Total 55    Blank areas are intentional and mean the treatment did not include these items.       PT Evaluation Code: (Please list factors)  Patient History/Comorbidities:   Patient Active Problem List   Diagnosis     ADHD (attention deficit hyperactivity disorder)     Anxiety     Familial hypercholesterolemia     Chronic back pain     Examination: altered posture, poor core strength, decrease trunk AROM  Clinical Presentation: stable  Clinical Decision Making: low    Patient History/  Comorbidities Examination  (body structures and functions, activity limitations, and/or participation restrictions) Clinical Presentation Clinical Decision Making (Complexity)   No documented Comorbidities or personal factors 1-2 Elements Stable and/or " uncomplicated Low   1-2 documented comorbidities or personal factor 3 Elements Evolving clinical presentation with changing characteristics Moderate   3-4 documented comorbidities or personal factors 4 or more Unstable and unpredictable High              Adelina Duque, PT  11/13/2020  6:58 AM

## 2021-06-13 NOTE — TELEPHONE ENCOUNTER
Controlled Substance Refill Request  Medication Name:   Requested Prescriptions     Pending Prescriptions Disp Refills     dextroamphetamine-amphetamine (ADDERALL) 10 mg Tab tablet 30 tablet 0     Sig: Take 1 tablet by mouth daily. In afternoon     dextroamphetamine-amphetamine (ADDERALL XR) 15 MG 24 hr capsule 30 capsule 0     Sig: Take 1 capsule (15 mg total) by mouth daily.     Date Last Fill: last filled 10/9/20  Requested Pharmacy: [facility]  Submit electronically to pharmacy  Controlled Substance Agreement on file:   Encounter-Level CSA Scan Date:    There are no encounter-level csa scan date.        Last office visit:  10/20/20

## 2021-06-13 NOTE — PROGRESS NOTES
"Grady Koo is a 35 y.o. male who is being evaluated via a billable video visit.      The patient has been notified of following:     \"This video visit will be conducted via a call between you and your physician/provider. We have found that certain health care needs can be provided without the need for an in-person physical exam.  This service lets us provide the care you need with a video conversation.  If a prescription is necessary we can send it directly to your pharmacy.  If lab work is needed we can place an order for that and you can then stop by our lab to have the test done at a later time.    Video visits are billed at different rates depending on your insurance coverage. Please reach out to your insurance provider with any questions.    If during the course of the call the physician/provider feels a video visit is not appropriate, you will not be charged for this service.\"    Patient has given verbal consent to a Video visit? Yes  How would you like to obtain your AVS? AVS Preference: MyChart.        Video Start Time: 2:18 PM    Video-Visit Details    Type of service:  Video Visit    Video End Time (time video stopped): 2:29 PM  Originating Location (pt. Location): Home    Distant Location (provider location):  [facility]     Platform used for Video Visit: Farzana Mejia CNP    Assessment:     Diagnoses and all orders for this visit:    Chronic left-sided low back pain without sciatica  -     OPS TFESI Lumbar Bilateral; Future; Expected date: 12/09/2020    Stenosis of lateral recess of lumbar spine  -     OPS TFESI Lumbar Bilateral; Future; Expected date: 12/09/2020    Protrusion of lumbar intervertebral disc  -     OPS TFESI Lumbar Bilateral; Future; Expected date: 12/09/2020    DDD (degenerative disc disease), lumbar  -     OPS TFESI Lumbar Bilateral; Future; Expected date: 12/09/2020       Grady Koo is a 35 y.o. y.o. male with past medical " history significant for anxiety, ADHD, familial hypercholesterolemia, history of bilateral carpal tunnel release surgery who presents today for follow-up regarding:    -Acute on chronic midline low back pain with left lower extremity paresthesias, some relief previously with left L4-5 TFESI.  MRI does show left disc protrusion with mild spinal stenosis and left greater than right subarticular recess stenosis.     Plan:     A shared decision making plan was used. The patient's values and choices were respected. Prior medical records from 11/5/2020 to current were reviewed today. The following represents what was discussed and decided upon by the provider and the patient.      -DIAGNOSTIC TESTS: Images were personally reviewed and interpreted.   --Lumbar spine MRI 9/17/2019 shows L4-5 left disc protrusion with mild spinal canal and left greater than right subarticular stenosis, no foraminal stenosis at any level.    -INTERVENTIONS: Ordered bilateral L4-5 transforaminal epidural steroid injection see if we get further relief of his chronic bilateral low back pain and left lower extremity paresthesias.  He got some relief previously with the left L4-5 TFESI however his back pain is bilateral.    -MEDICATIONS: No changes in medication advised patient continue with Flexeril and Aleve as needed.  Discussed side effects of medications and proper use. Patient verbalized understanding.    -PHYSICAL THERAPY: Encourage patient continue with physical therapy as he is currently working on MedX program and tolerating, minimal benefit so far however.  Discussed the importance of core strengthening, ROM, stretching exercises with the patient and how each of these entities is important in decreasing pain.  Explained to the patient that the purpose of physical therapy is to teach the patient a home exercise program.  These exercises need to be performed every day in order to decrease pain and prevent future occurrences of pain.         -PATIENT EDUCATION:  11 minutes of total visit time was spent face to face with the patient today, 60 % of the visit was spent on counseling, education, and coordinating care.   -5 minutes spent outside of visit time, non-face-to-face time, reviewing chart.  -Today we also discussed the issues related to the current COVID-19 pandemic, the pros and cons of the current treatment plan, the CDC guidelines such as social distancing, washing the hands, and covering the cough.    -FOLLOW UP: Follow-up for injection with Dr. Balderas 2 weeks postinjection  Advised to contact clinic if symptoms worsen or change.    Subjective:     Grady Koo is a 35 y.o. male who presents today for follow-up regarding chronic intermittent bilateral low back pain slightly worse on the left with left lower extremity numbness and tingling.  Significant initial relief with previous left L4-5 TFESI however he is still having bilateral low back pain that has slightly progressed again currently his pain is a 4/10, a 9 at its worst, 3 at its best.  Does report that his symptoms are more bothersome at nighttime as well as first thing in the morning and with moving and walking.    -Treatment to Date: No prior spinal surgery.  Physical therapy optimum Lumbar MedX program x7 sessions 12/8/2020.     Lumbar Inj 2006 with resolution pain.  Lumbar CHRISTIANO July 2020 in [place], awaiting records for specific level and laterality completed.  Left L4-5 TFESI 10/16/2020 with significant relief.  Preprocedure pain 5/10, post 3/10.     -Medications:  Flexeril 10 mg     Past medications:  Gabapentin 300 mg with no relief and significant side effects    Patient Active Problem List   Diagnosis     ADHD (attention deficit hyperactivity disorder)     Anxiety     Familial hypercholesterolemia     Chronic back pain       Current Outpatient Medications on File Prior to Encounter   Medication Sig Dispense Refill     cyclobenzaprine (FLEXERIL) 10 MG tablet Take 1  tablet (10 mg total) by mouth 3 (three) times a day as needed for muscle spasms. 30 tablet 1     buPROPion (WELLBUTRIN XL) 150 MG 24 hr tablet Take 1 tablet (150 mg total) by mouth daily. 90 tablet 3     dextroamphetamine-amphetamine (ADDERALL XR) 15 MG 24 hr capsule Take 1 capsule (15 mg total) by mouth daily. 30 capsule 0     dextroamphetamine-amphetamine (ADDERALL) 10 mg Tab tablet Take 1 tablet by mouth daily. In afternoon 30 tablet 0     No current facility-administered medications on file prior to encounter.        Allergies   Allergen Reactions     Morphine Nausea And Vomiting     Penicillins        Past Medical History:   Diagnosis Date     ADHD (attention deficit hyperactivity disorder) 9/23/2016     Anxiety 9/23/2016        Review of Systems  ROS:  Specifically negative for bowel/bladder dysfunction, balance changes, headache, dizziness, foot drop, fevers, chills, appetite changes, nausea/vomiting, unexplained weight loss. Otherwise 13 systems reviewed are negative. Please see the patient's intake questionnaire from today for details.    Reviewed Social, Family, Past Medical and Past Surgical history with patient, no significant changes noted since prior visit.     Objective:     VIRTUAL PHYSICAL EXAM:   --CONSTITUTIONAL: Well developed, well nourished, healthy appearing individual.  --PSYCHIATRIC: Appropriate mood and affect. No difficulty interacting due to temper, social withdrawal, or memory issues.  --SKIN: Lumbar region is visually dry and intact.   --RESPIRATORY: Normal rhythm and effort. No abnormal accessory muscle breathing patterns noted.   --STANDING EXAMINATION:  Normal lumbar lordosis noted, no lateral shift.  --MUSCULOSKELETAL: Lumbar spine inspection reveals no evidence of deformity.    RESULTS:   Imaging: Lumbar spine imaging was reviewed today. The images were shown to the patient and the findings were explained using a spine model.      Xr Lumbar Spine 2 Or 3 Vws  Result Date:  9/9/2019  EXAM: XR LUMBAR SPINE 2 OR 3 VWS LOCATION: [facility] DATE/TIME: 9/6/2019 4:21 PM INDICATION: Low back pain COMPARISON: None.   5 nonrib-bearing lumbar type vertebral bodies. Lumbar spine lordosis is maintained. Vertebral body heights are maintained. Lateral alignment is anatomic. Intervertebral disc space heights are maintained.        Mr Lumbar Spine Without Contrast  Result Date: 9/18/2019  INDICATION: Back pain or radiculopathy, > 6 weeks. Back pain or radiculopathy. COMPARISON: 09/06/2019 radiograph. TECHNIQUE: Without IV contrast FINDINGS: Nomenclature is based on five lumbar-type vertebral bodies. Vertebral body heights are maintained. Developing Schmorl's node along the inferior endplate of L4 with reactive marrow edema. The conus tip is identified at T12-L1. Cauda equina nerve roots are  unremarkable. Posterior paraspinal soft tissues are within normal limits.   T12-L1: Normal disc height and signal. No herniation. No facet arthropathy. No spinal canal stenosis. No right neural foraminal stenosis. No left neural foraminal stenosis.   L1-L2: Normal disc height and signal. No herniation. No facet arthropathy. No spinal canal stenosis. No right neural foraminal stenosis. No left neural foraminal stenosis.   L2-L3: Normal disc height and signal. No herniation. No facet arthropathy. No spinal canal stenosis. No right neural foraminal stenosis. No left neural foraminal stenosis.   L3-L4: Mild diffuse disc bulge. No facet arthropathy. No significant spinal canal stenosis. No right neural foraminal stenosis. No left neural foraminal stenosis.   L4-L5: Central/left paracentral disc protrusion. This results in mild spinal canal and left greater than right subarticular zone stenosis. No significant foraminal narrowing. No facet arthropathy.   L5-S1: Normal disc height and signal. No herniation. No facet arthropathy. No spinal canal stenosis. No right neural foraminal stenosis. No left neural foraminal  stenosis.   1.  At L4-L5, there is a central/left paracentral disc protrusion which results in mild spinal canal and left greater than right subarticular zone stenosis.   2.  No high-grade foraminal narrowing.   3.  Developing Schmorl's node along the inferior endplate of L4 with marrow edema.         Lumbar spine x-ray 7/22/2020 [facility]  Transitional vertebra lumbar  thoracolumbar junction.  Mild list to the left.  Some sclerosis of the sacroiliac joints is noted.  Lateral x-ray shows flattening of the lumbar spine lordosis.  Irregularity at the L4-5 disc space involving superior endplate of L5.  Left oblique view shows some narrowing of the L4-5 facet.  No evidence of spondylolysis.  Right oblique view shows some narrowing of the L4-5 facet.  Facet arthropathy L5-S1.        Lumbar spine MRI 2005-[facility]  Impression: Degenerative disc findings at L4-5 with posterior annular tear at L4-5, small central disc protrusion and left neuroforaminal disc protrusion.

## 2021-06-13 NOTE — PATIENT INSTRUCTIONS - HE
DISCHARGE INSTRUCTIONS    During office hours (8:00 a.m.- 4:00 p.m.) questions or concerns may be answered  by calling Spine Center Navigation Nurses at  [phone].  Messages received after hours will be returned the following business day.      In the case of an emergency, please dial 911 or seek assistance at the nearest Emergency Room/Urgent Care facility.     All Patients:    ? You may experience an increase in your symptoms for the first 2 days (It may take anywhere between 2 days- 2 weeks for the steroid to have maximum effect).    ? You may use ice on the injection site, as frequently as 20 minutes each hour if needed.    ? You may take your pain medicine.    ? You may continue taking your regular medication after your injection. If you have had a Medial Branch Block you may resume pain medication once your pain diary is completed.    ? You may shower. No swimming, tub bath or hot tub for 48 hours.  You may remove your bandaid/bandage as soon as you are home.    ? You may resume light activities, as tolerated.    ? Resume your usual diet as tolerated.    ? It is strongly advised that you do not drive for 1-3 hours post injection.    ? If you have had oral sedation:  Do not drive for 8 hours post injection.      ? If you have had IV sedation:  Do not drive for 24 hours post injection.  Do not operate hazardous machinery or make important personal/business decisions for 24 hours.      POSSIBLE STEROID SIDE EFFECTS (If steroid/cortisone was used for your procedure)    -If you experience these symptoms, it should only last for a short period      Swelling of the legs                Skin redness (flushing)       Mouth (oral) irritation     Blood sugar (glucose) levels              Sweats                      Mood changes    Headache    Sleeplessness    Weakened immune system for up to 14 days, which could increase the risk of radha the COVID-19 virus and/or experiencing more severe symptoms of the  disease, if exposed.    Decreased effectiveness of the flu vaccine if given within 2 weeks of the steroid.         POSSIBLE PROCEDURE SIDE EFFECTS  -Call the Spine Center if you are concerned    Increased Pain             Increased numbness/tingling        Nausea/Vomiting            Bruising/bleeding at site        Redness or swelling                                                Difficulty walking        Weakness             Fever greater than 100.5    *In the event of a severe headache after an epidural steroid injection that is relieved by lying down, please call the [facility] to speak with a clinical staff member*

## 2021-06-13 NOTE — TELEPHONE ENCOUNTER
Controlled Substance Refill Request  Medication Name:   Requested Prescriptions     Pending Prescriptions Disp Refills     dextroamphetamine-amphetamine (ADDERALL) 10 mg Tab tablet 30 tablet 0     Sig: Take 1 tablet by mouth daily. In afternoon     dextroamphetamine-amphetamine (ADDERALL XR) 15 MG 24 hr capsule 30 capsule 0     Sig: Take 1 capsule (15 mg total) by mouth daily.     Date Last Fill: 11/16/20   [facility]ed [facility]acy: [facility]  Submit electronically to pharmacy  Controlled Substance Agreement on file:   Encounter-Level CSA Scan Date:    There are no encounter-level csa scan date.        Last office visit:  10/20/20  Fariba Sun RN, MA  [facility]    Triage Nurse Advisor

## 2021-06-13 NOTE — PROGRESS NOTES
[facility] Rehabilitation Daily Progress     Patient Name: Grady Koo  Today's Date: 12/4/2020  Visit #: 6/16  Date of evaluation: 11/13/2020  Referral Diagnosis: Chronic left-sided low back pain without sciatica  Referring provider: Brittney Mejia CNP  Visit Diagnosis:     ICD-10-CM    1. Chronic left-sided low back pain without sciatica  M54.5     G89.29    2. Protrusion of intervertebral disc of lumbosacral region  M51.27    3. Generalized muscle weakness  M62.81        From Evaluation:  Grady is a 35 y.o. male who presents to therapy today with complaints of ongoing intermittent chronic bilateral low back pain that have been worse in the last couple of years on the left lumbosacral junction where he was having numbness and tingling down the left lower extremity as well.  He had gotten good relief with left L4-5 TFESI at the end of October with Dr. Schwartz but over the past week the symptoms have slowly returned  Assessment:     Today, we decided to try mechanical traction today. He states it was pretty gentle, not painful at all during, then when he got up, it was slightly sore. Patient did remain shifted even following traction today. He will see how it feels over the weekend.     We discussed that we can resume mechanical traction again next session.   He will also discuss further options with Brittney next Wed as well. We can continue with PT longer, but wanted him to touch base with Brittney regarding further treatment options.       Patient is appropriate to continue with skilled physical therapy intervention, as indicated by initial plan of care.    Goal Status:  Pt. will demonstrate/verbalize independence in self-management of condition in : 12 weeks  Pt. will be independent with home exercise program in : 12 weeks    Pt will: Able to roll in bed and not wake due to pain and able to get out of bed in AM with pain 0-2/10 within 16 visits  Pt will: Able to get up from floor with more ease  "as strength improves and pain levels decrease 0-2/10 within 16 visits      Plan / Patient Education:     Do another round of mechanical traction next session (if it was tolerable).  Then, we may schedule more visits for continuing with mechanical traction longer. He will discuss with Brittney the plan as well.     Subjective:     Pain Rating: 3-4  Patient is shifted to left still.    Mornings and later in the day remain painful.    Today is a decent day.     Overall, hasn't noticed a huge change with PT thus far.     Objective:     Treatment Today     Enter Week / Visit #: Wk 3 V 1  Weight (lbs): 78#  Reps (#): 30  ROM (degrees): 0-33    Exercises:  Exercise #1: current stretches:  LTR (tries to \"pop\" back), hamstring     Anything to take tension off left side  Comment #1: sidelying positional traction, standing sideglide, sideglide with extension, lateral shift correction in standing  Exercise #2: stretches:  sitting hamstring, SKC, LTR, piriformis below 90 degrees   hold 30 seconds X 1-3 reps  Comment #2: treadmill for warm up    5 minutes  Exercise #3: supine LE nerve glide   repeat 12-15 reps    3-5X/day  Comment #3: rotary torso   90 seconds, L,   34#  Exercise #4: prone lying 1 minute - added to HEP  Comment #4: prone on elbows - repeated 5x added to HEP, 2 times with PT pressure on SIJ regiong to correct shift      TREATMENT MINUTES COMMENTS   Evaluation     Self-care/ Home management     Manual therapy  Sacral offloading in prone.   Manual double leg traction and left unilateral traction with a bias to the right in supine.   Neuromuscular Re-education     Therapeutic Activity     Therapeutic Exercises  See flow sheet or above  [id number]   Gait training     Modality__________________ 30 Set up and take down time included.   explaination of traction    Supine  15 degree angle  100# max weight  60 sec on, 10 sec off  15 minutes  3 min ramp up and 3 min ramp down              Total 30    Blank areas are " intentional and mean the treatment did not include these items.       Liane Leach, PT  12/4/2020

## 2021-06-13 NOTE — PROGRESS NOTES
[facility] Rehabilitation Daily Progress     Patient Name: Grady Koo  Today's Date: 12/1/2020  Visit #: 5/16  Date of evaluation: 11/13/2020  Referral Diagnosis: Chronic left-sided low back pain without sciatica  Referring provider: Brittney Mejia CNP  Visit Diagnosis:     ICD-10-CM    1. Chronic left-sided low back pain without sciatica  M54.5     G89.29    2. Protrusion of intervertebral disc of lumbosacral region  M51.27    3. Generalized muscle weakness  M62.81        From Evaluation:  Grady is a 35 y.o. male who presents to therapy today with complaints of ongoing intermittent chronic bilateral low back pain that have been worse in the last couple of years on the left lumbosacral junction where he was having numbness and tingling down the left lower extremity as well.  He had gotten good relief with left L4-5 TFESI at the end of October with Dr. Schwartz but over the past week the symptoms have slowly returned  Assessment:     Patient is in week 3 of  MedX.  The first couple repetitions are a challenge then everything loosens up.  He feels the manual traction is helpful aligning himself more in neutral versus shifted but not long lasting. Will continue 2 more visits until his MD follow-up to see if he gets longer lasting results.   We have decided on manual traction vs. Mechanical d/t focusing on the bias to the right to help correct his shift and so that it can be gentle. He has done his inversion table in the past and it seems to bring him out of the pull too quickly and he is worse following. He feels the glides have helped to manage his symptoms.   He is doing prone press ups at home now and tolerating well. He was encouraged to do them more frequently throughout the day.    He continues with all other exercises.     We will continue per POC and assess the first week of Dec for progress. He was encouraged to make a f/u with referring provider in the next 2-3 weeks to check back in with  "progress. The last time he saw her, he was doing well s/p injection, but it didn't last long.     Patient is appropriate to continue with skilled physical therapy intervention, as indicated by initial plan of care.    Goal Status:  Pt. will demonstrate/verbalize independence in self-management of condition in : 12 weeks  Pt. will be independent with home exercise program in : 12 weeks    Pt will: Able to roll in bed and not wake due to pain and able to get out of bed in AM with pain 0-2/10 within 16 visits  Pt will: Able to get up from floor with more ease as strength improves and pain levels decrease 0-2/10 within 16 visits      Plan / Patient Education:     review stretches prn  Lumbar ext and rotary torso Med X exercise   Continue to trail manual traction in session. Did discuss with patient that we can trial the mechanical traction at somepoint, but he has rebound pain after inversion table and even a little after manual traction, so this way we can keep it gentle for a few more session instead of doing mechanical.       Subjective:     Pain Rating: 3-4  Patient is shifted to left today    Mornings and later in the day remain painful.    Yesterday I was stiff and today is what my normal has typically been  Don't think I have had much of an improvement in symptoms  The manual traction helps for about 30-60 minutes then my back is tight again and all symptoms return.    Objective:     Patient Active Problem List   Diagnosis     ADHD (attention deficit hyperactivity disorder)     Anxiety     Familial hypercholesterolemia     Chronic back pain     Treatment Today     Enter Week / Visit #: Wk 3 V 1  Weight (lbs): 78#  Reps (#): 30  ROM (degrees): 0-33    Exercises:  Exercise #1: current stretches:  LTR (tries to \"pop\" back), hamstring     Anything to take tension off left side  Comment #1: sidelying positional traction, standing sideglide, sideglide with extension, lateral shift correction in standing  Exercise #2: " stretches:  sitting hamstring, SKC, LTR, piriformis below 90 degrees   hold 30 seconds X 1-3 reps  Comment #2: treadmill for warm up    5 minutes  Exercise #3: supine LE nerve glide   repeat 12-15 reps    3-5X/day  Comment #3: rotary torso   90 seconds, L,   34#  Exercise #4: prone lying 1 minute - added to HEP  Comment #4: prone on elbows - repeated 5x added to HEP, 2 times with PT pressure on SIJ regiong to correct shift      TREATMENT MINUTES COMMENTS   Evaluation     Self-care/ Home management     Manual therapy 12 Sacral offloading in prone.   Manual double leg traction and left unilateral traction with a bias to the right in supine.   Neuromuscular Re-education     Therapeutic Activity     Therapeutic Exercises 12 See flow sheet or above  [id number]   Gait training     Modality__________________                Total 24 Pt 6 minutes late today   Blank areas are intentional and mean the treatment did not include these items.       Adelina Duque, PT  12/1/2020

## 2021-06-13 NOTE — PROGRESS NOTES
[facility] Rehabilitation Daily Progress     Patient Name: Grady Koo  Date: 11/17/2020  Visit #: 2/16  POC Dates: 11/13/2020  Referral Diagnosis: Chronic left-sided low back pain without sciatica  Referring provider: Kristen Royal MD  Visit Diagnosis:     ICD-10-CM    1. Chronic left-sided low back pain without sciatica  M54.5     G89.29    2. Protrusion of intervertebral disc of lumbosacral region  M51.27    3. Generalized muscle weakness  M62.81        From Evaluation:  Grady is a 35 y.o. male who presents to therapy today with complaints of ongoing intermittent chronic bilateral low back pain that have been worse in the last couple of years on the left lumbosacral junction where he was having numbness and tingling down the left lower extremity as well.  He had gotten good relief with left L4-5 TFESI at the end of October with Dr. Schwartz but over the past week the symptoms have slowly returned  Assessment:     Patient returns for their first physical therapy follow-up visit.  He feels the glides have helped to manage his symptoms.  He still will get pain but the the glides help.  Today he felt the nerve glides and other stretches will help with the other muscle groups to help with flexibility and movement.  Patient is appropriate to continue with skilled physical therapy intervention, as indicated by initial plan of care.    Goal Status:  Pt. will demonstrate/verbalize independence in self-management of condition in : 12 weeks  Pt. will be independent with home exercise program in : 12 weeks    Pt will: Able to roll in bed and not wake due to pain and able to get out of bed in AM with pain 0-2/10 within 16 visits  Pt will: Able to get up from floor with more ease as strength improves and pain levels decrease 0-2/10 within 16 visits      Plan / Patient Education:     review stretches prn  Lumbar ext and rotary torso Med X exercise  Trial traction     Subjective:     Pain Rating: 3    The  "stretches are helpful to decrease the symptoms.    As the day progresses I feel a little better  Mornings are painful      Objective:     Patient Active Problem List   Diagnosis     ADHD (attention deficit hyperactivity disorder)     Anxiety     Familial hypercholesterolemia     Chronic back pain       Treatment Today     Enter Week / Visit #: W1V2  Weight (lbs): 68#  Reps (#): 30  ROM (degrees): 0-33    Exercises:  Exercise #1: current stretches:  LTR (tries to \"pop\" back), hamstring     Anything to take tension off left side  Comment #1: sidelying positional traction, standing sideglide, sideglide with extension, lateral shift correction in standing  Exercise #2: stretches:  sitting hamstring, SKC, LTR, piriformis below 90 degrees   hold 30 seconds X 1-3 reps  Comment #2: treadmill for warm up    5 minutes  Exercise #3: supine LE nerve glide   repeat 12-15 reps    3-5X/day  Comment #3: rotary torso   90 seconds, R,   30#      TREATMENT MINUTES COMMENTS   Evaluation     Self-care/ Home management     Manual therapy     Neuromuscular Re-education     Therapeutic Activity     Therapeutic Exercises 28 See flow sheet or above  [id number]   Gait training     Modality__________________                Total 28    Blank areas are intentional and mean the treatment did not include these items.       Adelina Duque, PT  11/17/2020    "

## 2021-06-13 NOTE — PATIENT INSTRUCTIONS - HE
~Please call our [facility] Nurse Navigation line [phone] with any questions or concerns about your treatment plan, if symptoms worsen and you would like to be seen urgently, or if you have problems controlling bladder and bowel function.      Discussed the importance of core strengthening, ROM, stretching exercises with the patient and how each of these entities is important in decreasing pain.  Explained to the patient that the purpose of physical therapy is to teach the patient a home exercise program.  These exercises need to be performed every day in order to decrease pain and prevent future occurrences of pain.        A bilateral L4-5 transforaminal epidural steroid injection has been ordered today to potentially help with your pain symptoms. These injections do not fix what is going on in your back, therefore they typically do not take away the pain completely, however they can many times help improve symptoms. Injections should always be completed along with other modalities such as physical therapy for the best long term outcomes. If injections alone are done, then pain will likely return.     [facility] Injection Requirements      A  is required for all fluoroscopically-guided injections.    Injection appointments may be cancelled if there are signs/symptoms of an active infection or if the patient is being actively treated with antibiotics for a diagnosed infection.    Patients may have their steroid injection cancelled if they have had another steroid injection within 2 weeks.    Diabetic patients will have their blood glucose levels checked the day of their injection and the appointment will be rescheduled if the blood glucose level is 300 or higher.    Patients with allergies to cortisone, local anesthetics, iodine, or contrast dye should contact the Spine Center to further discuss these considerations.    Patients scheduled for medial branch block  diagnostic injections should refrain from taking pain medication the day of the procedure.  The medial branch block injection appointment will be rescheduled if the patient's pain rating is not 5/10 or greater at the time of the procedure.    Patients taking warfarin/Coumadin will have their INR checked the day of the procedure and the procedure may be rescheduled if the INR is greater than 3.0.    Please contact the Spine Center [phone]) if you are taking any prescription blood-thinning medications (warfarin, Plavix, Lovenox, Eliquis, Brilinta, Effient, etc.) as special dosing adjustments may need to be made depending on the type of injection you are scheduled to receive.    It is recommended that you delay having your steroid injection if you have received a flu shot or shingles vaccine within 2 weeks.

## 2021-06-13 NOTE — PROGRESS NOTES
[facility] Rehabilitation Daily Progress     Patient Name: Grady Koo  Today's Date: 11/20/2020  Visit #: 3/16  Date of evaluation: 11/13/2020  Referral Diagnosis: Chronic left-sided low back pain without sciatica  Referring provider: Kristen Royal MD  Visit Diagnosis:     ICD-10-CM    1. Chronic left-sided low back pain without sciatica  M54.5     G89.29    2. Protrusion of intervertebral disc of lumbosacral region  M51.27    3. Generalized muscle weakness  M62.81        From Evaluation:  Grady is a 35 y.o. male who presents to therapy today with complaints of ongoing intermittent chronic bilateral low back pain that have been worse in the last couple of years on the left lumbosacral junction where he was having numbness and tingling down the left lower extremity as well.  He had gotten good relief with left L4-5 TFESI at the end of October with Dr. Schwartz but over the past week the symptoms have slowly returned  Assessment:     Patient felt the manual traction done in clinic today was helpful. We will continue to do this. We decided on manual traction vs. Mechanical d/t focusing on the bias to the right to help correct his shift and so that it can be gentle. He feels his inversion table brings him out of the pull too quickly and he is worse following. He feels the glides have helped to manage his symptoms.   We added prone and prone on elbows to HEP today.   He continues with all other exercises.   We will continue per POC and assess the first week of Dec for progress.  Patient is appropriate to continue with skilled physical therapy intervention, as indicated by initial plan of care.    Goal Status:  Pt. will demonstrate/verbalize independence in self-management of condition in : 12 weeks  Pt. will be independent with home exercise program in : 12 weeks    Pt will: Able to roll in bed and not wake due to pain and able to get out of bed in AM with pain 0-2/10 within 16 visits  Pt will: Able  "to get up from floor with more ease as strength improves and pain levels decrease 0-2/10 within 16 visits      Plan / Patient Education:     review stretches prn  Lumbar ext and rotary torso Med X exercise  Trial traction     Subjective:     Pain Rating: 3-4  Patient is shifted to left today    The stretches are helpful to decrease the symptoms.    As the day progresses I feel a little better  Mornings are painful      Objective:     Patient Active Problem List   Diagnosis     ADHD (attention deficit hyperactivity disorder)     Anxiety     Familial hypercholesterolemia     Chronic back pain       Treatment Today     Enter Week / Visit #: W2V1  Weight (lbs): 70#  Reps (#): 30  ROM (degrees): 0-33    Exercises:  Exercise #1: current stretches:  LTR (tries to \"pop\" back), hamstring     Anything to take tension off left side  Comment #1: sidelying positional traction, standing sideglide, sideglide with extension, lateral shift correction in standing  Exercise #2: stretches:  sitting hamstring, SKC, LTR, piriformis below 90 degrees   hold 30 seconds X 1-3 reps  Comment #2: treadmill for warm up    5 minutes  Exercise #3: supine LE nerve glide   repeat 12-15 reps    3-5X/day  Comment #3: rotary torso   90 seconds, R,   30#  Exercise #4: prone lying 1 minute - added to HEP  Comment #4: prone on elbows - repeated 5x added to HEP, 2 times with PT pressure on SIJ regiong to correct shift      TREATMENT MINUTES COMMENTS   Evaluation     Self-care/ Home management     Manual therapy 10 Sacral offloading in prone.   Manual double leg traction and left unilateral traction with a bias to the right in supine.   Neuromuscular Re-education     Therapeutic Activity     Therapeutic Exercises 25 See flow sheet or above  [id number]   Gait training     Modality__________________                Total 35    Blank areas are intentional and mean the treatment did not include these items.       Liane Leach, PT  11/20/2020  "

## 2021-06-14 NOTE — PROGRESS NOTES
[facility] Rehabilitation Daily Progress     Patient Name: Grady Koo  Today's Date: 2021  Visit #:   Date of evaluation: 2020  Referral Diagnosis: Chronic left-sided low back pain without sciatica  Referring provider: Brittney Mejia CNP  Visit Diagnosis:     ICD-10-CM    1. Chronic left-sided low back pain without sciatica  M54.5     G89.29    2. Protrusion of intervertebral disc of lumbosacral region  M51.27    3. Generalized muscle weakness  M62.81        From Evaluation:  Grady is a 35 y.o. male who presents to therapy today with complaints of ongoing intermittent chronic bilateral low back pain that have been worse in the last couple of years on the left lumbosacral junction where he was having numbness and tingling down the left lower extremity as well.  He had gotten good relief with left L4-5 TFESI at the end of October with Dr. Schwartz but over the past week the symptoms have slowly returned  Assessment:     Patient is in his fourth week of MedX.  This is his first visit back since the injections and he feels he is standing straighter and is able get out of bed easier and up from the floor.  He does feel the traction has been helpful so would like to continue this with the MedX.   Patient is appropriate to continue with skilled physical therapy intervention, as indicated by initial plan of care.      Goal Status:  Pt. will demonstrate/verbalize independence in self-management of condition in : 12 weeks  Pt. will be independent with home exercise program in : 12 weeks    Pt will: Able to roll in bed and not wake due to pain and able to get out of bed in AM with pain 0-2/10 within 16 visits  Pt will: Able to get up from floor with more ease as strength improves and pain levels decrease 0-2/10 within 16 visits      Plan / Patient Education:     Continue per POC Patient agrees with plan.    Continue with PT - traction and medX combo.      Subjective:     Pain Ratin     First  "time back to therapy since injections.  It felt better right away after the injection  4-5 days post injections I have gotten a little pain but still much better overall.  I don't feel like I am shifting to the left.  I still use the inversion table for a 20-30 seconds to help stretch it.  Think the traction was helpful so want to keep doing it.      Objective:     Standing taller, less of a shift noted today in clinic.     Treatment Today     Enter Week / Visit #: Wk 4V2  Weight (lbs): 88#  Reps (#): 27  ROM (degrees): 0-42   more motion today    Exercises:  Exercise #1: current stretches:  LTR (tries to \"pop\" back), hamstring     Anything to take tension off left side  Comment #1: sidelying positional traction, standing sideglide, sideglide with extension, lateral shift correction in standing  Exercise #2: stretches:  sitting hamstring, SKC, LTR, piriformis below 90 degrees   hold 30 seconds X 1-3 reps  Comment #2: treadmill for warm up   3 minutes  Exercise #3: supine LE nerve glide   repeat 12-15 reps    3-5X/day  Comment #3: rotary torso   90 seconds, L,   40#  Exercise #4: prone lying 1 minute - added to HEP  Comment #4: prone on elbows - repeated 5x added to HEP, 2 times with PT pressure on SIJ regiong to correct shift      TREATMENT MINUTES COMMENTS   Evaluation     Self-care/ Home management     Manual therapy     Neuromuscular Re-education     Therapeutic Activity     Therapeutic Exercises 12 See flow sheet or above  [id number]   Gait training     Modality__________________ 23 Set up and take down time included.     Supine  15 degree angle  100# max weight  60 sec on, 10 sec off  15 minutes  3 min ramp up and 3 min ramp down              Total 35    Blank areas are intentional and mean the treatment did not include these items.       Adelina Duque, PT  1/5/2021  "

## 2021-06-14 NOTE — PROGRESS NOTES
[facility] Rehabilitation Daily Progress     Patient Name: Grady Koo  Today's Date: 1/12/2021  Visit #: 11/16  Date of evaluation: 11/13/2020  Referral Diagnosis: Chronic left-sided low back pain without sciatica  Referring provider: Brittney Mejia CNP  Visit Diagnosis:     ICD-10-CM    1. Chronic left-sided low back pain without sciatica  M54.5     G89.29    2. Protrusion of intervertebral disc of lumbosacral region  M51.27    3. Generalized muscle weakness  M62.81        From Evaluation:  Grady is a 35 y.o. male who presents to therapy today with complaints of ongoing intermittent chronic bilateral low back pain that have been worse in the last couple of years on the left lumbosacral junction where he was having numbness and tingling down the left lower extremity as well.  He had gotten good relief with left L4-5 TFESI at the end of October with Dr. Schwartz but over the past week the symptoms have slowly returned  Assessment:     Patient has felt improvement since his most recent injection. He is no longer shifted in his trunk and is able to do things better around the house.  We were able to add some strengthening to his HEP as he was feeling good today.  This will help with more consistent stabilization as he can work on these daily to gain strength  Patient felt fine with no traction or massage today.  He is encouraged by the sense of improvement since he has had pain for so long.  We will continue with traction and start to focus more on home exercises for strengthening. This is a piece that he doesn't have yet for home management.     Goal Status:  Pt. will demonstrate/verbalize independence in self-management of condition in : 12 weeks  Pt. will be independent with home exercise program in : 12 weeks    Pt will: Able to roll in bed and not wake due to pain and able to get out of bed in AM with pain 0-2/10 within 16 visits  Pt will: Able to get up from floor with more ease as strength  "improves and pain levels decrease 0-2/10 within 16 visits      Plan / Patient Education:     Continue per POC Patient agrees with plan.    Continue with PT - traction and medX combo.  Focus on home strengthening exercises! He doesn't have many of these exercises at home yet.    Also, may consider discontinuing traction after a few more session of it and have him just continue with the inversion table at home some time, then we can focus on strengthening now that he has some pain improvement from the injection!      Subjective:     Pain Ratin     I am feeling good today, walking normal.  I had a little something yesterday that didn't feel good, but I am fine today.  I still use the inversion table for a 20-30 seconds to help stretch it.  Think the traction continues to be helpful so want to keep doing it.      Objective:     No shift in trunk noted at all today.     Treatment Today     Enter Week / Visit #: Wk 5V1  Weight (lbs): 94#   ok to increase next visit  Reps (#): 30  ROM (degrees): 0-42    Exercises:  Exercise #1: current stretches:  LTR (tries to \"pop\" back), hamstring     Anything to take tension off left side  Comment #1: sidelying positional traction, standing sideglide, sideglide with extension, lateral shift correction in standing  Exercise #2: stretches:  sitting hamstring, SKC, LTR, piriformis below 90 degrees   hold 30 seconds X 1-3 reps  Comment #2: treadmill for warm up   3 minutes  Exercise #3: supine LE nerve glide   repeat 12-15 reps    3-5X/day  Comment #3: rotary torso   90 seconds, R,   44#  Exercise #4: prone lying 1 minute - added to HEP  Comment #4: prone on elbows - repeated 5x added to HEP, 2 times with PT pressure on SIJ regiong to correct shift  Exercise #5: bridges  hold 10 seconds x 6-12 reps  Comment #5: clamshell X20  Exercise #6: low abdominal 3B (UUDD)  X 20      TREATMENT MINUTES COMMENTS   Evaluation     Self-care/ Home management     Manual therapy  Patient supine: manual " L LE axial distraction    Patient right sidelying: illiac crest depression myofacsial release. And QL release.   Neuromuscular Re-education     Therapeutic Activity     Therapeutic Exercises 24 See flow sheet or above  [id number]   Gait training     Modality__________________ Not today due to machine not working. (traction wasn't working today,)      Set up and take down time included.     Supine  15 degree angle  100# max weight  60 sec on, 10 sec off  15 minutes  3 min ramp up and 3 min ramp down              Total 24 Pt 5 minutes late today   Blank areas are intentional and mean the treatment did not include these items.       Adelina Duque, PT  1/12/2021

## 2021-06-14 NOTE — PROGRESS NOTES
[facility] Rehabilitation Daily Progress     Patient Name: Grady Koo  Today's Date: 1/29/2021  Visit #: 15/16  Date of evaluation: 11/13/2020  Referral Diagnosis: Chronic left-sided low back pain without sciatica  Referring provider: Brittney Mejia CNP  Visit Diagnosis:     ICD-10-CM    1. Chronic left-sided low back pain without sciatica  M54.5     G89.29    2. Protrusion of intervertebral disc of lumbosacral region  M51.27    3. Generalized muscle weakness  M62.81        From Evaluation:  Grady is a 35 y.o. male who presents to therapy today with complaints of ongoing intermittent chronic bilateral low back pain that have been worse in the last couple of years on the left lumbosacral junction where he was having numbness and tingling down the left lower extremity as well.  He had gotten good relief with left L4-5 TFESI at the end of October with Dr. Schwartz but over the past week the symptoms have slowly returned  Assessment:     Still holding at about 75% improvement with his back in general  Able to add some core ex on the reformer to help with more core strengthening while in the clinic.  No pain and could feel muscle fatigue.  Improving ROM!  Patient continues to benefit from further PT.   Patient agrees with plan.     Goal Status:  Pt. will demonstrate/verbalize independence in self-management of condition in : 12 weeks  Pt. will be independent with home exercise program in : 12 weeks    Pt will: Able to roll in bed and not wake due to pain and able to get out of bed in AM with pain 0-2/10 within 16 visits  Pt will: Able to get up from floor with more ease as strength improves and pain levels decrease 0-2/10 within 16 visits      Plan / Patient Education:     Continue per POC Patient agrees with plan.  Decrease to 1X/week    Subjective:     Not much pain at the moment.  Still pain with getting up out of chair, bed or off ground. Walking is pain free.    Objective:     No shift in trunk  "noted for the past few weeks!     Forward flexion: full ROM without pain.    Improving ROM in lower back as demonstrated by going forward to 51 degrees, but still feels a pain at the 51 degree forward jose.     Slump: negative, no pain in his back when doing slump and much improved ROM.     Seated hip hinge: feels slight pain at end range of flexed forward with straight spine - discussion of how adding hip hinge to HEP can help with increasing ability to get up from a chair.     Treatment Today     Enter Week / Visit #: Wk 7 V 1  Weight (lbs): 116#  Reps (#): 30  ROM (degrees): 0-51 (increase from 45 degrees)    Exercises:  Exercise #1: current stretches:  LTR (tries to \"pop\" back), hamstring     Anything to take tension off left side  Comment #1: sidelying positional traction, standing sideglide, sideglide with extension, lateral shift correction in standing  Exercise #2: stretches:  sitting hamstring, SKC, LTR, piriformis below 90 degrees   hold 30 seconds X 1-3 reps  Comment #2: treadmill for warm up   3 minutes  Exercise #3: supine LE nerve glide   repeat 12-15 reps    3-5X/day  Comment #3: rotary torso   90 seconds, L,   52#  Exercise #4: prone lying 1 minute - added to HEP  Comment #4: prone on elbows - repeated 5x added to HEP, 2 times with PT pressure on SIJ regiong to correct shift  Exercise #5: bridges  hold 10 seconds x 6-12 reps - reviewed  Comment #5: clamshell X20 - reviewed  Exercise #6: low abdominal 3B (UUDD)  X 20 - reviewed, cues to not lift head up  Comment #6: supermans - reviewed  Exercise #7: prone hip extension \"flutter kick\" 3 sets of 10 reviewed  Comment #7: quadruped hip extension kick back 3 sets of 10 reviewed  Exercise #8: front plank on toes and elbows added to HEP 30 sec x 3  Comment #8: side plank on knees and top leg straight hold 30 sec x 3 HEP  Exercise #9: reformer: supine bilateral leg press, 3R, 1B, 1G  X 15  Comment #9: reformer: supine, lat pull down, 3R, 1G  X 15  Exercise " #10: reformer: supine bridges (no movement)  2R  x 15  Comment #10: seated hip hinge exercise - added to HEP for 10x per day. ed that this will help with getting out of a chair with less pain  Exercise #11: mini squat with side stepping band - orange and green given 30 sec back and forth added to HEP  Comment #11: wall squat with band around knees holding 10 sec x 3 added to HEP  Exercise #12: lunges - repeats 10x added to HEP      TREATMENT MINUTES COMMENTS   Evaluation     Self-care/ Home management     Manual therapy  Patient supine: manual L LE axial distraction    Patient right sidelying: illiac crest depression myofacsial release. And QL release.   Neuromuscular Re-education     Therapeutic Activity     Therapeutic Exercises 27 See flow sheet or above    Added band sidesteps, wall squats, hip hinge and lunges to HEP  [id number]   Gait training     Modality__________________ Not needed any more!               Total 27    Blank areas are intentional and mean the treatment did not include these items.       Liane Leach, PT  1/29/2021

## 2021-06-14 NOTE — PROGRESS NOTES
[facility] Rehabilitation Daily Progress     Patient Name: Grady Koo  Today's Date: 1/8/2021  Visit #: 10/16  Date of evaluation: 11/13/2020  Referral Diagnosis: Chronic left-sided low back pain without sciatica  Referring provider: Brittney Mejia CNP  Visit Diagnosis:     ICD-10-CM    1. Chronic left-sided low back pain without sciatica  M54.5     G89.29    2. Protrusion of intervertebral disc of lumbosacral region  M51.27    3. Generalized muscle weakness  M62.81        From Evaluation:  Grady is a 35 y.o. male who presents to therapy today with complaints of ongoing intermittent chronic bilateral low back pain that have been worse in the last couple of years on the left lumbosacral junction where he was having numbness and tingling down the left lower extremity as well.  He had gotten good relief with left L4-5 TFESI at the end of October with Dr. Schwartz but over the past week the symptoms have slowly returned  Assessment:     Patient has felt improvement since his most recent injection. He is no longer shifted in his trunk and is able to do things better around the house. He is not pain free, but improving. He is encouraged by the sense of improvement since he has had pain for so long.  We will continue with traction and start to focus more on home exercises for strengthening. This is a piece that he doesn't have yet for home management.     Goal Status:  Pt. will demonstrate/verbalize independence in self-management of condition in : 12 weeks  Pt. will be independent with home exercise program in : 12 weeks    Pt will: Able to roll in bed and not wake due to pain and able to get out of bed in AM with pain 0-2/10 within 16 visits  Pt will: Able to get up from floor with more ease as strength improves and pain levels decrease 0-2/10 within 16 visits      Plan / Patient Education:     Continue per POC Patient agrees with plan.    Continue with PT - traction and medX combo.  Focus on home  "strengthening exercises! He doesn't have many of these exercises at home yet.    Also, may consider discontinuing traction after a few more session of it and have him just continue with the inversion table at home some time, then we can focus on strengthening now that he has some pain improvement from the injection!      Subjective:     Pain Ratin     Patient feels 70% back to \"normal\" still since the most recent injection. He is encouraged by this!  I still use the inversion table for a 20-30 seconds to help stretch it.  Think the traction continues to be helpful so want to keep doing it.      Objective:     No shift in trunk noted at all today.     Is tender to left QL.     Treatment Today     Enter Week / Visit #: Wk 4V2  Weight (lbs): 88#  Reps (#): 27  ROM (degrees): 0-42   more motion today    Exercises:  Exercise #1: current stretches:  LTR (tries to \"pop\" back), hamstring     Anything to take tension off left side  Comment #1: sidelying positional traction, standing sideglide, sideglide with extension, lateral shift correction in standing  Exercise #2: stretches:  sitting hamstring, SKC, LTR, piriformis below 90 degrees   hold 30 seconds X 1-3 reps  Comment #2: treadmill for warm up   3 minutes  Exercise #3: supine LE nerve glide   repeat 12-15 reps    3-5X/day  Comment #3: rotary torso   90 seconds, L,   40#  Exercise #4: prone lying 1 minute - added to HEP  Comment #4: prone on elbows - repeated 5x added to HEP, 2 times with PT pressure on SIJ regiong to correct shift      TREATMENT MINUTES COMMENTS   Evaluation     Self-care/ Home management     Manual therapy 15 Patient supine: manual L LE axial distraction    Patient right sidelying: illiac crest depression myofacsial release. And QL release.   Neuromuscular Re-education     Therapeutic Activity     Therapeutic Exercises  (no time for medX today)  [id number]   Gait training     Modality__________________  (traction wasn't working today, so did " manual therapy instead)      Set up and take down time included.     Supine  15 degree angle  100# max weight  60 sec on, 10 sec off  15 minutes  3 min ramp up and 3 min ramp down              Total 15 Patient late to appt today.   Blank areas are intentional and mean the treatment did not include these items.       Liane Leach, PT  1/8/2021

## 2021-06-14 NOTE — PROGRESS NOTES
[facility] Rehabilitation Daily Progress     Patient Name: Grady Koo  Today's Date: 1/15/2021  Visit #: 12/16  Date of evaluation: 11/13/2020  Referral Diagnosis: Chronic left-sided low back pain without sciatica  Referring provider: Brittney Mejia CNP  Visit Diagnosis:     ICD-10-CM    1. Chronic left-sided low back pain without sciatica  M54.5     G89.29    2. Protrusion of intervertebral disc of lumbosacral region  M51.27    3. Generalized muscle weakness  M62.81        From Evaluation:  Grady is a 35 y.o. male who presents to therapy today with complaints of ongoing intermittent chronic bilateral low back pain that have been worse in the last couple of years on the left lumbosacral junction where he was having numbness and tingling down the left lower extremity as well.  He had gotten good relief with left L4-5 TFESI at the end of October with Dr. Schwartz but over the past week the symptoms have slowly returned  Assessment:     Still holding at about 75% improvement with his back in general. This progress was made after the most recent injection.   We are shifting from traction focus to home strengthening exercises.   Patient continues to benefit from further PT.   Patient agrees with plan.     Goal Status:  Pt. will demonstrate/verbalize independence in self-management of condition in : 12 weeks  Pt. will be independent with home exercise program in : 12 weeks    Pt will: Able to roll in bed and not wake due to pain and able to get out of bed in AM with pain 0-2/10 within 16 visits  Pt will: Able to get up from floor with more ease as strength improves and pain levels decrease 0-2/10 within 16 visits      Plan / Patient Education:     Continue per POC Patient agrees with plan.    Continue with PT - traction and medX combo - though we are going to hold on traction to focus on strengthening now d/t patient feeling better since injection.     Also, may consider discontinuing traction after a  "few more session of it and have him just continue with the inversion table at home some time, then we can focus on strengthening now that he has some pain improvement from the injection!      Subjective:     Pain Ratin    The worse the pain has been is a 5-6/10 upon waking and doing things around the house.   Hasn't been shifted to the left in his trunk at all since the most recent injection.  The inverson table helps to reduce the pain when it does increase during his day.     Objective:     No shift in trunk noted at all today.     Slump: negative, no pain in his back when doing slump.     Treatment Today     Enter Week / Visit #: Wk 5V1  Weight (lbs): 94#   ok to increase next visit  Reps (#): 30  ROM (degrees): 0-42    Exercises:  Exercise #1: current stretches:  LTR (tries to \"pop\" back), hamstring     Anything to take tension off left side  Comment #1: sidelying positional traction, standing sideglide, sideglide with extension, lateral shift correction in standing  Exercise #2: stretches:  sitting hamstring, SKC, LTR, piriformis below 90 degrees   hold 30 seconds X 1-3 reps  Comment #2: treadmill for warm up   3 minutes  Exercise #3: supine LE nerve glide   repeat 12-15 reps    3-5X/day  Comment #3: rotary torso   90 seconds, R,   44#  Exercise #4: prone lying 1 minute - added to HEP  Comment #4: prone on elbows - repeated 5x added to HEP, 2 times with PT pressure on SIJ regiong to correct shift  Exercise #5: bridges  hold 10 seconds x 6-12 reps  Comment #5: clamshell X20  Exercise #6: low abdominal 3B (UUDD)  X 20      TREATMENT MINUTES COMMENTS   Evaluation     Self-care/ Home management     Manual therapy  Patient supine: manual L LE axial distraction    Patient right sidelying: illiac crest depression myofacsial release. And QL release.   Neuromuscular Re-education     Therapeutic Activity     Therapeutic Exercises 30 See flow sheet or above  [id number]    *give handouts next session of new exercises, " PT Rx wasn't working today.   Gait training     Modality__________________ Not today due to machine not working. (traction wasn't working today,)      Set up and take down time included.     Supine  15 degree angle  100# max weight  60 sec on, 10 sec off  15 minutes  3 min ramp up and 3 min ramp down              Total 30    Blank areas are intentional and mean the treatment did not include these items.       Liane Leach, PT  1/15/2021

## 2021-06-14 NOTE — TELEPHONE ENCOUNTER
Controlled Substance Refill Request  Medication Name:   Requested Prescriptions     Pending Prescriptions Disp Refills     dextroamphetamine-amphetamine (ADDERALL) 10 mg Tab tablet 30 tablet 0     Sig: Take 1 tablet by mouth daily. In afternoon     dextroamphetamine-amphetamine (ADDERALL XR) 15 MG 24 hr capsule 30 capsule 0     Sig: Take 1 capsule (15 mg total) by mouth daily.     Date Last Fill: 12/14/20  Requested [facility]acy: [facility]  Submit electronically to pharmacy  Controlled Substance Agreement on file:   Encounter-Level CSA Scan Date:    There are no encounter-level csa scan date.        Last office visit:  10/20/20

## 2021-06-14 NOTE — PROGRESS NOTES
[facility] Rehabilitation Daily Progress     Patient Name: Grady Koo  Today's Date: 1/22/2021  Visit #: 13/16  Date of evaluation: 11/13/2020  Referral Diagnosis: Chronic left-sided low back pain without sciatica  Referring provider: Brittney Mejia CNP  Visit Diagnosis:     ICD-10-CM    1. Chronic left-sided low back pain without sciatica  M54.5     G89.29    2. Protrusion of intervertebral disc of lumbosacral region  M51.27    3. Generalized muscle weakness  M62.81        From Evaluation:  Grady is a 35 y.o. male who presents to therapy today with complaints of ongoing intermittent chronic bilateral low back pain that have been worse in the last couple of years on the left lumbosacral junction where he was having numbness and tingling down the left lower extremity as well.  He had gotten good relief with left L4-5 TFESI at the end of October with Dr. Schwartz but over the past week the symptoms have slowly returned  Assessment:     Still holding at about 75% improvement with his back in general. This progress was made after the most recent injection. He feels good with doing the stretches and has been working on core strengthening!  We are shifting from traction focus to home strengthening exercises.   Patient continues to benefit from further PT.   Patient agrees with plan.     Goal Status:  Pt. will demonstrate/verbalize independence in self-management of condition in : 12 weeks  Pt. will be independent with home exercise program in : 12 weeks    Pt will: Able to roll in bed and not wake due to pain and able to get out of bed in AM with pain 0-2/10 within 16 visits  Pt will: Able to get up from floor with more ease as strength improves and pain levels decrease 0-2/10 within 16 visits      Plan / Patient Education:     Continue per POC Patient agrees with plan.    Continue with PT - no longer needing raction, focus on core strength!       Subjective:     Pain Rating: mild    Stiff every morning,  "eases after doing stretches and moving around. Can't put his socks on right away in the morning.     Objective:     No shift in trunk noted for the past few weeks!     Slump: negative, no pain in his back when doing slump and much improved ROM.     Treatment Today     Enter Week / Visit #: Wk 6 V 1  Weight (lbs): 107#  Reps (#): 25  ROM (degrees): 0-45    Exercises:  Exercise #1: current stretches:  LTR (tries to \"pop\" back), hamstring     Anything to take tension off left side  Comment #1: sidelying positional traction, standing sideglide, sideglide with extension, lateral shift correction in standing  Exercise #2: stretches:  sitting hamstring, SKC, LTR, piriformis below 90 degrees   hold 30 seconds X 1-3 reps  Comment #2: treadmill for warm up   3 minutes  Exercise #3: supine LE nerve glide   repeat 12-15 reps    3-5X/day  Comment #3: rotary torso   90 seconds, R,   46#  Exercise #4: prone lying 1 minute - added to HEP  Comment #4: prone on elbows - repeated 5x added to HEP, 2 times with PT pressure on SIJ regiong to correct shift  Exercise #5: bridges  hold 10 seconds x 6-12 reps - reviewed  Comment #5: clamshell X20 - reviewed  Exercise #6: low abdominal 3B (UUDD)  X 20 - reviewed, cues to not lift head up  Comment #6: supermans - reviewed  Exercise #7: prone hip extension \"flutter kick\" 3 sets of 10 reviewed  Comment #7: quadruped hip extension kick back 3 sets of 10 reviewed  Exercise #8: front plank on toes and elbows added to HEP 30 sec x 3  Comment #8: side plank on knees and top leg straight hold 30 sec x 3 HEP      TREATMENT MINUTES COMMENTS   Evaluation     Self-care/ Home management     Manual therapy  Patient supine: manual L LE axial distraction    Patient right sidelying: illiac crest depression myofacsial release. And QL release.   Neuromuscular Re-education     Therapeutic Activity     Therapeutic Exercises 30 See flow sheet or above    Added planks and side planks on knees to HEP    [id number] "   Gait training     Modality__________________ Not needed any more!               Total 30    Blank areas are intentional and mean the treatment did not include these items.       Liane Leach, PT  1/22/2021

## 2021-06-14 NOTE — PROGRESS NOTES
[facility] Rehabilitation Daily Progress     Patient Name: Grady Koo  Today's Date: 1/26/2021  Visit #: 14/16  Date of evaluation: 11/13/2020  Referral Diagnosis: Chronic left-sided low back pain without sciatica  Referring provider: Brittney Mejia CNP  Visit Diagnosis:     ICD-10-CM    1. Chronic left-sided low back pain without sciatica  M54.5     G89.29    2. Protrusion of intervertebral disc of lumbosacral region  M51.27    3. Generalized muscle weakness  M62.81        From Evaluation:  Grady is a 35 y.o. male who presents to therapy today with complaints of ongoing intermittent chronic bilateral low back pain that have been worse in the last couple of years on the left lumbosacral junction where he was having numbness and tingling down the left lower extremity as well.  He had gotten good relief with left L4-5 TFESI at the end of October with Dr. Schwartz but over the past week the symptoms have slowly returned  Assessment:     Still holding at about 75% improvement with his back in general  Able to add some core ex on the reformer to help with more core strengthening while in the clinic.  No pain and could feel muscle fatigue.  Patient continues to benefit from further PT.   Patient agrees with plan.     Goal Status:  Pt. will demonstrate/verbalize independence in self-management of condition in : 12 weeks  Pt. will be independent with home exercise program in : 12 weeks    Pt will: Able to roll in bed and not wake due to pain and able to get out of bed in AM with pain 0-2/10 within 16 visits  Pt will: Able to get up from floor with more ease as strength improves and pain levels decrease 0-2/10 within 16 visits      Plan / Patient Education:     Continue per POC Patient agrees with plan.  Decrease to 1X/week    Subjective:     Pain Rating:     Two days ago I had a little sharp pain but stretching resolved it.  The past couple days I have been feeling great.  Think I am still progressing with  "my function.     Objective:     No shift in trunk noted for the past few weeks!     Slump: negative, no pain in his back when doing slump and much improved ROM.     Treatment Today     Enter Week / Visit #: W6V2  Weight (lbs): 113#  Reps (#): 30  ROM (degrees): 0-48 (increase of 3 degrees)    Exercises:  Exercise #1: current stretches:  LTR (tries to \"pop\" back), hamstring     Anything to take tension off left side  Comment #1: sidelying positional traction, standing sideglide, sideglide with extension, lateral shift correction in standing  Exercise #2: stretches:  sitting hamstring, SKC, LTR, piriformis below 90 degrees   hold 30 seconds X 1-3 reps  Comment #2: treadmill for warm up   3 minutes  Exercise #3: supine LE nerve glide   repeat 12-15 reps    3-5X/day  Comment #3: rotary torso   90 seconds, L,   52#  Exercise #4: prone lying 1 minute - added to HEP  Comment #4: prone on elbows - repeated 5x added to HEP, 2 times with PT pressure on SIJ regiong to correct shift  Exercise #5: bridges  hold 10 seconds x 6-12 reps - reviewed  Comment #5: clamshell X20 - reviewed  Exercise #6: low abdominal 3B (UUDD)  X 20 - reviewed, cues to not lift head up  Comment #6: supermans - reviewed  Exercise #7: prone hip extension \"flutter kick\" 3 sets of 10 reviewed  Comment #7: quadruped hip extension kick back 3 sets of 10 reviewed  Exercise #8: front plank on toes and elbows added to HEP 30 sec x 3  Comment #8: side plank on knees and top leg straight hold 30 sec x 3 HEP  Exercise #9: reformer: supine bilateral leg press, 3R, 1B, 1G  X 15  Comment #9: reformer: supine, lat pull down, 3R, 1G  X 15  Exercise #10: reformer: supine bridges (no movement)  2R  x 15      TREATMENT MINUTES COMMENTS   Evaluation     Self-care/ Home management     Manual therapy  Patient supine: manual L LE axial distraction    Patient right sidelying: illiac crest depression myofacsial release. And QL release.   Neuromuscular Re-education   "   Therapeutic Activity     Therapeutic Exercises 30 See flow sheet or above    Added planks and side planks on knees to HEP    [id number]   Gait training     Modality__________________ Not needed any more!               Total 30    Blank areas are intentional and mean the treatment did not include these items.       Adelina Duque, PT  1/26/2021

## 2021-06-14 NOTE — PATIENT INSTRUCTIONS - HE
~Please call our [facility] Nurse Navigation line [phone] with any questions or concerns about your treatment plan, if symptoms worsen and you would like to be seen urgently, or if you have problems controlling bladder and bowel function.      Discussed the importance of core strengthening, ROM, stretching exercises with the patient and how each of these entities is important in decreasing pain.  Explained to the patient that the purpose of physical therapy is to teach the patient a home exercise program.  These exercises need to be performed every day in order to decrease pain and prevent future occurrences of pain.

## 2021-06-14 NOTE — PROGRESS NOTES
Grady Koo is a 35 y.o. male who is being evaluated via a billable video visit.      How would you like to obtain your AVS? Johanhart.        Video Start Time: 11:00 AM      Video-Visit Details    Type of service:  Video Visit    Video End Time (time video stopped): 11:20 AM  Originating Location (pt. Location): Home    Distant Location (provider location):  [facility]     Platform used for Video Visit: Doximity, and well failed as patient could not hear provider.    Assessment:     Diagnoses and all orders for this visit:    Chronic left-sided low back pain without sciatica    Stenosis of lateral recess of lumbar spine    Protrusion of lumbar intervertebral disc    DDD (degenerative disc disease), lumbar       Grady Koo is a 35 y.o. y.o. male with past medical history significant for anxiety, ADHD, familial hypercholesterolemia, history of bilateral carpal tunnel release surgery who presents today for follow-up regarding:    -Acute on chronic midline low back pain with left lower extremity paresthesias with 70% relief with recent bilateral L4-5 TFESI.  He does report that he got better relief with this injection in comparison to previous left L4-5 TFESI alone.  MRI does show left disc protrusion with mild stenosis and left greater than right subarticular recess stenosis.     Plan:     A shared decision making plan was used. The patient's values and choices were respected. Prior medical records from 12/9/2020 to current were reviewed today. The following represents what was discussed and decided upon by the provider and the patient.        -DIAGNOSTIC TESTS: Images were personally reviewed and interpreted.   --No need for further imaging at this time however we could obtain updated MRI down the road if symptoms progress again.  --Lumbar spine MRI 9/17/2019 shows L4-5 left disc protrusion with mild spinal canal and left greater than right subarticular stenosis, no foraminal  stenosis at any level.    -INTERVENTIONS: No further injection recommendations.  Could repeat bilateral L4-5 TFESI down the road if symptoms are recurrent in 3 months or later.    -MEDICATIONS: No changes in medication advised patient to continue with Flexeril as needed.  Discussed side effects of medications and proper use. Patient verbalized understanding.    -PHYSICAL THERAPY: Encourage patient to continue with home exercises as well as further physical therapy sessions with [facility] program which she is tolerating with some soreness.  Discussed the importance of core strengthening, ROM, stretching exercises with the patient and how each of these entities is important in decreasing pain.  Explained to the patient that the purpose of physical therapy is to teach the patient a home exercise program.  These exercises need to be performed every day in order to decrease pain and prevent future occurrences of pain.        -PATIENT EDUCATION:  20 minutes of total visit time was spent face to face with the patient today, 60 % of the visit was spent on counseling, education, and coordinating care.   -5 minutes spent outside of visit time, non-face-to-face time, reviewing chart.  -Today we also discussed the issues related to the current COVID-19 pandemic, the pros and cons of the current treatment plan, the CDC guidelines such as social distancing, washing the hands, and covering the cough.    -FOLLOW UP: Follow-up as needed  Advised to contact clinic if symptoms worsen or change.    Subjective:     Grady Koo is a 35 y.o. male who presents today for follow-up regarding chronic intermittent bilateral low back pain that had recently worsened on the left with left lower extremity paresthesias.  Patient reports about 70% relief with recent bilateral L4-5 TFESI and does feel much improvement.  He feels that this injection was much more beneficial than previous injection on the left.  Patient currently reports his pain is a  3/10, 5 at its worst, 2 at its best.  Denies any new symptoms.  Denies current numbness or tingling sensations as he does report that that has improved post injection, denies lower extremity weakness.  Patient is overall much more happy with this injection in comparison to the previous injection.    -Treatment to Date: No prior spinal surgery.  Physical therapy [facility] x7 sessions 12/8/2020.     Lumbar Inj 2006 with resolution pain.  Lumbar CHRISTIANO July 2020 in [place], awaiting records for specific level and laterality completed.  Left L4-5 TFESI 10/16/2020 with significant relief initially, minimal lasting benefit.  Preprocedure pain 5/10, post 3/10.  Bilateral L4-5 TFESI 12/22/2020 with 70% relief (patient reports better relief with this injection in comparison to left only CHRISTIANO).  Preprocedure pain 3/10, post 2/10.     -Medications:  Flexeril 10 mg     Past medications:  Gabapentin 300 mg with no relief and significant side effects    Patient Active Problem List   Diagnosis     ADHD (attention deficit hyperactivity disorder)     Anxiety     Familial hypercholesterolemia     Chronic back pain       Current Outpatient Medications on File Prior to Visit   Medication Sig Dispense Refill     buPROPion (WELLBUTRIN XL) 150 MG 24 hr tablet Take 1 tablet (150 mg total) by mouth daily. 90 tablet 3     cyclobenzaprine (FLEXERIL) 10 MG tablet Take 1 tablet (10 mg total) by mouth 3 (three) times a day as needed for muscle spasms. 30 tablet 1     dextroamphetamine-amphetamine (ADDERALL XR) 15 MG 24 hr capsule Take 1 capsule (15 mg total) by mouth daily. 30 capsule 0     dextroamphetamine-amphetamine (ADDERALL) 10 mg Tab tablet Take 1 tablet by mouth daily. In afternoon 30 tablet 0     No current facility-administered medications on file prior to visit.        Allergies   Allergen Reactions     Morphine Nausea And Vomiting     Penicillins        Past Medical History:   Diagnosis Date     ADHD (attention  deficit hyperactivity disorder) 9/23/2016     Anxiety 9/23/2016        Review of Systems  ROS:  Specifically negative for bowel/bladder dysfunction, balance changes, headache, dizziness, foot drop, fevers, chills, appetite changes, nausea/vomiting, unexplained weight loss. Otherwise 13 systems reviewed are negative. Please see the patient's intake questionnaire from today for details.    Reviewed Social, Family, Past Medical and Past Surgical history with patient, no significant changes noted since prior visit.     Objective:     VIRTUAL PHYSICAL EXAM:   --CONSTITUTIONAL: Well developed, well nourished, healthy appearing individual.  --PSYCHIATRIC: Appropriate mood and affect. No difficulty interacting due to temper, social withdrawal, or memory issues.  --SKIN: Lumbar region is visually dry and intact.   --RESPIRATORY: Normal rhythm and effort. No abnormal accessory muscle breathing patterns noted.   --STANDING EXAMINATION:  Normal lumbar lordosis noted, no lateral shift.  --MUSCULOSKELETAL: Lumbar spine inspection reveals no evidence of deformity.     RESULTS:   Imaging: Lumbar spine imaging was reviewed today. The images were shown to the patient and the findings were explained using a spine model.      Xr Lumbar Spine 2 Or 3 Vws  Result Date: 9/9/2019  EXAM: XR LUMBAR SPINE 2 OR 3 VWS LOCATION: [facility] DATE/TIME: 9/6/2019 4:21 PM INDICATION: Low back pain COMPARISON: None.   5 nonrib-bearing lumbar type vertebral bodies. Lumbar spine lordosis is maintained. Vertebral body heights are maintained. Lateral alignment is anatomic. Intervertebral disc space heights are maintained.        Mr Lumbar Spine Without Contrast  Result Date: 9/18/2019  INDICATION: Back pain or radiculopathy, > 6 weeks. Back pain or radiculopathy. COMPARISON: 09/06/2019 radiograph. TECHNIQUE: Without IV contrast FINDINGS: Nomenclature is based on five lumbar-type vertebral bodies. Vertebral body heights are maintained. Developing  Schmorl's node along the inferior endplate of L4 with reactive marrow edema. The conus tip is identified at T12-L1. Cauda equina nerve roots are  unremarkable. Posterior paraspinal soft tissues are within normal limits.   T12-L1: Normal disc height and signal. No herniation. No facet arthropathy. No spinal canal stenosis. No right neural foraminal stenosis. No left neural foraminal stenosis.   L1-L2: Normal disc height and signal. No herniation. No facet arthropathy. No spinal canal stenosis. No right neural foraminal stenosis. No left neural foraminal stenosis.   L2-L3: Normal disc height and signal. No herniation. No facet arthropathy. No spinal canal stenosis. No right neural foraminal stenosis. No left neural foraminal stenosis.   L3-L4: Mild diffuse disc bulge. No facet arthropathy. No significant spinal canal stenosis. No right neural foraminal stenosis. No left neural foraminal stenosis.   L4-L5: Central/left paracentral disc protrusion. This results in mild spinal canal and left greater than right subarticular zone stenosis. No significant foraminal narrowing. No facet arthropathy.   L5-S1: Normal disc height and signal. No herniation. No facet arthropathy. No spinal canal stenosis. No right neural foraminal stenosis. No left neural foraminal stenosis.   1.  At L4-L5, there is a central/left paracentral disc protrusion which results in mild spinal canal and left greater than right subarticular zone stenosis.   2.  No high-grade foraminal narrowing.   3.  Developing Schmorl's node along the inferior endplate of L4 with marrow edema.         Lumbar spine x-ray 7/22/2020 [facility]  Transitional vertebra lumbar  thoracolumbar junction.  Mild list to the left.  Some sclerosis of the sacroiliac joints is noted.  Lateral x-ray shows flattening of the lumbar spine lordosis.  Irregularity at the L4-5 disc space involving superior endplate of L5.  Left oblique view shows some narrowing of the L4-5 facet.  No  evidence of spondylolysis.  Right oblique view shows some narrowing of the L4-5 facet.  Facet arthropathy L5-S1.        Lumbar spine MRI 2005-[facility]  Impression: Degenerative disc findings at L4-5 with posterior annular tear at L4-5, small central disc protrusion and left neuroforaminal disc protrusion.

## 2021-06-15 NOTE — TELEPHONE ENCOUNTER
Controlled Substance Refill Request  Medication Name:   Requested Prescriptions     Pending Prescriptions Disp Refills     dextroamphetamine-amphetamine (ADDERALL) 10 mg Tab tablet 30 tablet 0     Sig: Take 1 tablet by mouth daily. In afternoon     dextroamphetamine-amphetamine (ADDERALL XR) 15 MG 24 hr capsule 30 capsule 0     Sig: Take 1 capsule (15 mg total) by mouth daily.     Date Last Fill: 2/11/21  [facility]ed [facility]acy: [facility]  Submit electronically to pharmacy  Controlled Substance Agreement on file:   Encounter-Level CSA Scan Date:    There are no encounter-level csa scan date.        Last office visit:  10/20/20

## 2021-06-15 NOTE — PROGRESS NOTES
[facility] Rehabilitation Daily Progress     Patient Name: Grady Koo  Today's Date: 2/26/2021  Visit #: 18/22 per updated POC on 2-5-21  Date of evaluation: 11/13/2020  Referral Diagnosis: Chronic left-sided low back pain without sciatica  Referring provider: Brittney Mejia CNP  Visit Diagnosis:     ICD-10-CM    1. Chronic left-sided low back pain without sciatica  M54.5     G89.29    2. Protrusion of intervertebral disc of lumbosacral region  M51.27    3. Generalized muscle weakness  M62.81        From Evaluation:  Grady is a 35 y.o. male who presents to therapy today with complaints of ongoing intermittent chronic bilateral low back pain that have been worse in the last couple of years on the left lumbosacral junction where he was having numbness and tingling down the left lower extremity as well.  He had gotten good relief with left L4-5 TFESI at the end of October with Dr. Schwartz but over the past week the symptoms have slowly returned  Assessment:     Patient had a set back last week after sitting in a car for 7 hours within 3 days. But, he feels he is already recovering from the flare up. The stretches have helped him and he's not shifted like he typically would be after a flare up.  Patient did well during today's session despite his recent flare up.   We discussed ways to sit comfortably in a car when he does have to travel.     Goal Status:  Pt. will demonstrate/verbalize independence in self-management of condition in : 12 weeks  Pt. will be independent with home exercise program in : 12 weeks  Pt will: Able to roll in bed and not wake due to pain and able to get out of bed in AM with pain 0-2/10 within 16 visits  Pt will: Able to get up from floor with more ease as strength improves and pain levels decrease 0-2/10 within 16 visits    Plan / Patient Education:     Continue per POC Patient agrees with plan.  Continue with 1 time next week, see how this flare up has been going for  "another week.  Then, likely a month out follow up.    Subjective:     Flare up of back pain last week with driving 3.5 hours each direction. He does feel that he is starting to recover from this flare up, but it's still slightly painful.     Not much pain at the moment.     Objective:     Forward flexion: full ROM without pain, no pain upon returning    Full ROM with prone trunk extension    Slump: negative, no pain in his back when doing slump and much improved ROM.     No shift noted in spine today.     Treatment Today     Enter Week / Visit #: Wk 10  Weight (lbs): 124#  Reps (#): 20  ROM (degrees): 0-45    Exercises:  Comment #2: treadmill for warm up  Comment #3: rotary torso   90 seconds, L,   56#  Exercise #4: prone press ups - reviewed and suggested to do during flare  Exercise #5: bridges  hold 10 seconds x 6-12 reps - reviewed  Comment #5: clamshell X20 - reviewed  Exercise #6: low abdominal 3B (UUDD)  X 20 - reviewed, cues to not lift head up  Comment #6: supermans - reviewed  Exercise #7: prone hip extension \"flutter kick\" 3 sets of 10 reviewed  Comment #7: quadruped hip extension kick back 3 sets of 10 reviewed  Exercise #8: front plank on toes and elbows added to HEP 30 sec x 3  Comment #8: side plank on knees and top leg straight hold 30 sec x 3 HEP  Exercise #9: reformer: supine bilateral leg press, 3R, 1B, 1G  X 15  Comment #9: reformer: supine, lat pull down, 3R, 1G  X 15  Exercise #10: reformer: supine bridges (no movement)  2R  x 15  Comment #10: seated hip hinge exercise - added to HEP for 10x per day. ed that this will help with getting out of a chair with less pain  Exercise #11: squat - good form, no pain  Comment #11: wall squat with band around knees holding 10 sec x 3 added to HEP  Exercise #12: lunges - good form without pain today  Comment #12: reformer: standing hip abduction 2R 10x bilat  Exercise #13: side stepping skaters 30 sec  Comment #13: standing hip abduction repeats 30 sec " bilat      TREATMENT MINUTES COMMENTS   Evaluation     Self-care/ Home management 10 Discussion of sitting posture in car, to use towel roll during driving, especially for long strives for lumbar support, to perform gentle stretches during a flare up just like he did.   Manual therapy  Patient supine: manual L LE axial distraction    Patient right sidelying: illiac crest depression myofacsial release. And QL release.   Neuromuscular Re-education     Therapeutic Activity     Therapeutic Exercises 20 See flow sheet or above    [id number]   Gait training     Modality__________________                Total 30    Blank areas are intentional and mean the treatment did not include these items.       Liane Leach, PT  2/26/2021

## 2021-06-15 NOTE — PROGRESS NOTES
[facility] Rehabilitation Daily Progress     Patient Name: Grady Koo  Today's Date: 2/12/2021  Visit #: 17/22 per updated POC on 2-5-21  Date of evaluation: 11/13/2020  Referral Diagnosis: Chronic left-sided low back pain without sciatica  Referring provider: Brittney Mejia CNP  Visit Diagnosis:     ICD-10-CM    1. Chronic left-sided low back pain without sciatica  M54.5     G89.29    2. Protrusion of intervertebral disc of lumbosacral region  M51.27    3. Generalized muscle weakness  M62.81        From Evaluation:  Grady is a 35 y.o. male who presents to therapy today with complaints of ongoing intermittent chronic bilateral low back pain that have been worse in the last couple of years on the left lumbosacral junction where he was having numbness and tingling down the left lower extremity as well.  He had gotten good relief with left L4-5 TFESI at the end of October with Dr. Schwartz but over the past week the symptoms have slowly returned  Assessment:     Felt even more improvement the past week. He is noticing movements that are completely pain free at times, which is a good improvement. Getting up in the morning still causes pain, but is less intense and goes away faster.   Starting to tolerate higher level exercises    Goal Status:  Pt. will demonstrate/verbalize independence in self-management of condition in : 12 weeks  Pt. will be independent with home exercise program in : 12 weeks  Pt will: Able to roll in bed and not wake due to pain and able to get out of bed in AM with pain 0-2/10 within 16 visits  Pt will: Able to get up from floor with more ease as strength improves and pain levels decrease 0-2/10 within 16 visits    Plan / Patient Education:     Continue per POC Patient agrees with plan.  Continue with 1 time per week x 2 weeks.   Then, likely a month out follow up.    Subjective:     Not much pain at the moment. Feels even more progress from last week.  Getting out of bed in the  "mornings: still pretty sore. But, does seem a little less intense.    Objective:     Forward flexion: full ROM without pain, no pain upon returning    Slump: negative, no pain in his back when doing slump and much improved ROM.     Treatment Today     Enter Week / Visit #: Wk 9  Weight (lbs): 122#  Reps (#): 20  ROM (degrees): 0-45    Exercises:  Exercise #2: stretches:  sitting hamstring, SKC, LTR, piriformis below 90 degrees   hold 30 seconds X 1-3 reps  Comment #2: treadmill for warm up   3 minutes  Exercise #3: supine LE nerve glide   repeat 12-15 reps    3-5X/day  Comment #3: rotary torso   90 seconds, L,   54#  Exercise #4: prone lying 1 minute - added to HEP  Comment #4: prone on elbows - repeated 5x added to HEP, 2 times with PT pressure on SIJ regiong to correct shift  Exercise #5: bridges  hold 10 seconds x 6-12 reps - reviewed  Comment #5: clamshell X20 - reviewed  Exercise #6: low abdominal 3B (UUDD)  X 20 - reviewed, cues to not lift head up  Comment #6: supermans - reviewed  Exercise #7: prone hip extension \"flutter kick\" 3 sets of 10 reviewed  Comment #7: quadruped hip extension kick back 3 sets of 10 reviewed  Exercise #8: front plank on toes and elbows added to HEP 30 sec x 3  Comment #8: side plank on knees and top leg straight hold 30 sec x 3 HEP  Exercise #9: reformer: supine bilateral leg press, 3R, 1B, 1G  X 15  Comment #9: reformer: supine, lat pull down, 3R, 1G  X 15  Exercise #10: reformer: supine bridges (no movement)  2R  x 15  Comment #10: seated hip hinge exercise - added to HEP for 10x per day. ed that this will help with getting out of a chair with less pain  Exercise #11: mini squat with side stepping band - orange and green given 30 sec back and forth added to HEP  Comment #11: wall squat with band around knees holding 10 sec x 3 added to HEP  Exercise #12: lunges - repeats 30 sec x 2  Comment #12: reformer: standing hip abduction 2R 10x bilat  Exercise #13: side stepping skaters 30 " sec  Comment #13: standing hip abduction repeats 30 sec bilat      TREATMENT MINUTES COMMENTS   Evaluation     Self-care/ Home management     Manual therapy  Patient supine: manual L LE axial distraction    Patient right sidelying: illiac crest depression myofacsial release. And QL release.   Neuromuscular Re-education     Therapeutic Activity     Therapeutic Exercises 25 See flow sheet or above    Also education on ways to modify exercises during workouts if needed.     [id number]   Gait training     Modality__________________ Not needed any more!               Total 25    Blank areas are intentional and mean the treatment did not include these items.       Liane Leach, PT  2/12/2021

## 2021-06-15 NOTE — PROGRESS NOTES
[facility] Rehabilitation Daily Progress     Patient Name: Grady Koo  Today's Date: 3/5/2021  Visit #: 19/22 per updated POC on 2-5-21  Date of evaluation: 11/13/2020  Referral Diagnosis: Chronic left-sided low back pain without sciatica  Referring provider: Brittney Mejia CNP  Visit Diagnosis:     ICD-10-CM    1. Chronic left-sided low back pain without sciatica  M54.5     G89.29    2. Protrusion of intervertebral disc of lumbosacral region  M51.27    3. Generalized muscle weakness  M62.81        From Evaluation:  Grady is a 35 y.o. male who presents to therapy today with complaints of ongoing intermittent chronic bilateral low back pain that have been worse in the last couple of years on the left lumbosacral junction where he was having numbness and tingling down the left lower extremity as well.  He had gotten good relief with left L4-5 TFESI at the end of October with Dr. Schwartz but over the past week the symptoms have slowly returned  Assessment:     Overall, patient is feeling better compared to beginning of PT.   The injections helped. The traction helped and now he has progressed to high level exercises. He was encouraged to slowly return to running and keep up with his strengthening routine.     Patient will return to PT in 1-2 months to check in on how returning to higher level exercise is ivis gie: running.     Goal Status:  Pt. will demonstrate/verbalize independence in self-management of condition in : 12 weeks  Pt. will be independent with home exercise program in : 12 weeks  Pt will: Able to roll in bed and not wake due to pain and able to get out of bed in AM with pain 0-2/10 within 16 visits  Pt will: Able to get up from floor with more ease as strength improves and pain levels decrease 0-2/10 within 16 visits    Plan / Patient Education:     Continue per POC Patient agrees with plan.  F/U in 1-2 months.    Subjective:     Feeling good, just a little tightness in  "back.    Objective:     Lumbar ROM:     Date: 11/13/20 3/5/2021   *Indicate scale AROM AROM   Lumbar Flexion Hands to knees, leans to left, Pain Full ROM without pain   Lumbar Extension Unable,  pain Full ROM without pain    Right Left Right Left   Lumbar Sidebending 50% limited, pain on left Pain, unable Full ROM without pain Full ROM without pain     Slump on evaluation:   Slump: negative, no pain in his back when doing slump and much improved ROM.     MedX, lumbar on Eval:  Enter Week / Visit #: W1V1  Weight (lbs): 65#  Reps (#): 17, stopped due to pain starting  ROM (degrees): 0-33    MedX, Lumbar today:  Enter Week / Visit #: Wk 11 (TEST with new computer)  Weight (lbs): TEST: MAX: DE: 127#  Reps (#): 20  ROM (degrees): 0-51  Pain: tight    Exercises:  Comment #2: treadmill for warm up  Comment #3: rotary torso   90 seconds, L,   56#  Exercise #4: prone press ups - reviewed and suggested to do during flare  Exercise #5: bridges  hold 10 seconds x 6-12 reps - reviewed  Comment #5: clamshell X20 - reviewed  Exercise #6: low abdominal 3B (UUDD)  X 20 - reviewed, cues to not lift head up  Comment #6: supermans - reviewed  Exercise #7: prone hip extension \"flutter kick\" 3 sets of 10 reviewed  Comment #7: quadruped hip extension kick back 3 sets of 10 reviewed  Exercise #8: front plank on toes and elbows added to HEP 30 sec x 3  Comment #8: side plank on knees and top leg straight hold 30 sec x 3 HEP  Exercise #9: reformer: supine bilateral leg press, 3R, 1B, 1G  X 15  Comment #9: reformer: supine, lat pull down, 3R, 1G  X 15  Exercise #10: reformer: supine bridges (no movement)  2R  x 15  Comment #10: seated hip hinge exercise - added to HEP for 10x per day. ed that this will help with getting out of a chair with less pain  Exercise #11: squat - good form, no pain  Comment #11: wall squat with band around knees holding 10 sec x 3 added to HEP  Exercise #12: lunges - good form without pain today  Comment #12: " reformer: standing hip abduction 2R 10x bilat  Exercise #13: side stepping skaters 30 sec  Comment #13: standing hip abduction repeats 30 sec bilat      TREATMENT MINUTES COMMENTS   Evaluation     Self-care/ Home management  Discussion of sitting posture in car, to use towel roll during driving, especially for long strives for lumbar support, to perform gentle stretches during a flare up just like he did.   Manual therapy  Patient supine: manual L LE axial distraction    Patient right sidelying: illiac crest depression myofacsial release. And QL release.   Neuromuscular Re-education     Therapeutic Activity     Therapeutic Exercises 30 Reassessed ROM and tested lumbar medX.   Gait training     Modality__________________                Total 30    Blank areas are intentional and mean the treatment did not include these items.       Liane Leach, PT  3/5/2021

## 2021-06-15 NOTE — PROGRESS NOTES
[facility] Rehabilitation Daily Progress     Patient Name: Grady Koo  Today's Date: 2/5/2021  Visit #: 16/22 per updated POC on 2-5-21  Date of evaluation: 11/13/2020  Referral Diagnosis: Chronic left-sided low back pain without sciatica  Referring provider: Brittney Mejia CNP  Visit Diagnosis:     ICD-10-CM    1. Chronic left-sided low back pain without sciatica  M54.5     G89.29    2. Protrusion of intervertebral disc of lumbosacral region  M51.27    3. Generalized muscle weakness  M62.81        From Evaluation:  Grady is a 35 y.o. male who presents to therapy today with complaints of ongoing intermittent chronic bilateral low back pain that have been worse in the last couple of years on the left lumbosacral junction where he was having numbness and tingling down the left lower extremity as well.  He had gotten good relief with left L4-5 TFESI at the end of October with Dr. Schwartz but over the past week the symptoms have slowly returned  Assessment:     Felt even more improvement the past week. He is noticing movements that are completely pain free at times, which is a good improvement.     Goal Status:  Pt. will demonstrate/verbalize independence in self-management of condition in : 12 weeks  Pt. will be independent with home exercise program in : 12 weeks    Pt will: Able to roll in bed and not wake due to pain and able to get out of bed in AM with pain 0-2/10 within 16 visits  Pt will: Able to get up from floor with more ease as strength improves and pain levels decrease 0-2/10 within 16 visits      Plan / Patient Education:     Continue per POC Patient agrees with plan.  Continue with 1 time per week x 3 weeks.   Then, likely a month out follow up.    Updated POC on 2-5-2021:  Authorization / Certification Number of Visits: 22  Communication with: Referral Source  Patient Related Instruction: Nature of Condition;Treatment plan and rationale;Self Care instruction;Basis of treatment;Body  "mechanics;Posture  Times per Week: 1  Number of Visits: 6 more  Therapeutic Exercise: ROM;Stretching  Neuromuscular Reeducation: kinesio tape;posture;core  Manual Therapy: soft tissue mobilization;myofascial release  Modalities: traction;TENS        Subjective:     Not much pain at the moment.  Still pain with getting up out of chair, bed or off ground at times, but not every time any longer!    Objective:     No shift in trunk noted for the past few weeks!     Forward flexion: full ROM without pain, no pain upon returning    Slump: negative, no pain in his back when doing slump and much improved ROM.     Treatment Today     Enter Week / Visit #: Wk 7 V 1  Weight (lbs): 116#  Reps (#): 30  ROM (degrees): 0-51 (increase from 45 degrees)    Exercises:  Exercise #1: current stretches:  LTR (tries to \"pop\" back), hamstring     Anything to take tension off left side  Comment #1: sidelying positional traction, standing sideglide, sideglide with extension, lateral shift correction in standing  Exercise #2: stretches:  sitting hamstring, SKC, LTR, piriformis below 90 degrees   hold 30 seconds X 1-3 reps  Comment #2: treadmill for warm up   3 minutes  Exercise #3: supine LE nerve glide   repeat 12-15 reps    3-5X/day  Comment #3: rotary torso   90 seconds, L,   52#  Exercise #4: prone lying 1 minute - added to HEP  Comment #4: prone on elbows - repeated 5x added to HEP, 2 times with PT pressure on SIJ regiong to correct shift  Exercise #5: bridges  hold 10 seconds x 6-12 reps - reviewed  Comment #5: clamshell X20 - reviewed  Exercise #6: low abdominal 3B (UUDD)  X 20 - reviewed, cues to not lift head up  Comment #6: supermans - reviewed  Exercise #7: prone hip extension \"flutter kick\" 3 sets of 10 reviewed  Comment #7: quadruped hip extension kick back 3 sets of 10 reviewed  Exercise #8: front plank on toes and elbows added to HEP 30 sec x 3  Comment #8: side plank on knees and top leg straight hold 30 sec x 3 HEP  Exercise " #9: reformer: supine bilateral leg press, 3R, 1B, 1G  X 15  Comment #9: reformer: supine, lat pull down, 3R, 1G  X 15  Exercise #10: reformer: supine bridges (no movement)  2R  x 15  Comment #10: seated hip hinge exercise - added to HEP for 10x per day. ed that this will help with getting out of a chair with less pain  Exercise #11: mini squat with side stepping band - orange and green given 30 sec back and forth added to HEP  Comment #11: wall squat with band around knees holding 10 sec x 3 added to HEP  Exercise #12: lunges - repeats 10x added to HEP      TREATMENT MINUTES COMMENTS   Evaluation     Self-care/ Home management     Manual therapy  Patient supine: manual L LE axial distraction    Patient right sidelying: illiac crest depression myofacsial release. And QL release.   Neuromuscular Re-education     Therapeutic Activity     Therapeutic Exercises 25 See flow sheet or above    Discussion of trying a P2P-Next workout over the next week (ie: bill)     [id number]   Gait training     Modality__________________ Not needed any more!               Total 25    Blank areas are intentional and mean the treatment did not include these items.       Liane Leach, PT  2/5/2021

## 2021-06-15 NOTE — TELEPHONE ENCOUNTER
Controlled Substance Refill Request  Medication Name:   Requested Prescriptions     Pending Prescriptions Disp Refills     dextroamphetamine-amphetamine (ADDERALL) 10 mg Tab tablet 30 tablet 0     Sig: Take 1 tablet by mouth daily. In afternoon     dextroamphetamine-amphetamine (ADDERALL XR) 15 MG 24 hr capsule 30 capsule 0     Sig: Take 1 capsule (15 mg total) by mouth daily.     Date Last Fill: 1/14/21  [facility]ed [facility]: [facility]  Submit electronically to pharmacy  Controlled Substance Agreement on file:   Encounter-Level CSA Scan Date:    There are no encounter-level csa scan date.        Last office visit:  10/20/20

## 2021-06-16 PROBLEM — E78.01 FAMILIAL HYPERCHOLESTEROLEMIA: Status: ACTIVE | Noted: 2018-04-27

## 2021-06-16 PROBLEM — M54.9 CHRONIC BACK PAIN: Status: ACTIVE | Noted: 2020-10-20

## 2021-06-16 PROBLEM — G89.29 CHRONIC BACK PAIN: Status: ACTIVE | Noted: 2020-10-20

## 2021-06-16 NOTE — TELEPHONE ENCOUNTER
Controlled Substance Refill Request  Medication Name:   Requested Prescriptions     Pending Prescriptions Disp Refills     dextroamphetamine-amphetamine (ADDERALL) 10 mg Tab tablet 30 tablet 0     Sig: Take 1 tablet by mouth daily. In afternoon     dextroamphetamine-amphetamine (ADDERALL XR) 15 MG 24 hr capsule 30 capsule 0     Sig: Take 1 capsule (15 mg total) by mouth daily.     Date Last Fill: 3/15/21  [facility]ed [facility]  Submit electronically to pharmacy  Controlled Substance Agreement on file:   Encounter-Level CSA Scan Date:    There are no encounter-level csa scan date.        Last office visit:  10/20/20

## 2021-06-17 NOTE — PROGRESS NOTES
Grady Koo is a 36 y.o. male who is being evaluated via a billable video visit.      How would you like to obtain your AVS? MyChart.        Video Start Time: 3:25 PM      Video-Visit Details    Type of service:  Video Visit    Video End Time (time video stopped): 3:36 PM  Originating Location (pt. Location): Home    Distant Location (provider location):  [facility]     Platform used for Video Visit: [employer]    Assessment:     Diagnoses and all orders for this visit:    Chronic left-sided low back pain with left-sided sciatica  -     MR Lumbar Spine Without Contrast; Future; Expected date: 05/19/2021    Left lumbar radiculitis  -     MR Lumbar Spine Without Contrast; Future; Expected date: 05/19/2021    Stenosis of lateral recess of lumbar spine  -     MR Lumbar Spine Without Contrast; Future; Expected date: 05/19/2021    Protrusion of lumbar intervertebral disc  -     MR Lumbar Spine Without Contrast; Future; Expected date: 05/19/2021    DDD (degenerative disc disease), lumbar  -     MR Lumbar Spine Without Contrast; Future; Expected date: 05/19/2021       Grady Koo is a 36 y.o. y.o. male with past medical history significant for anxiety, ADHD, familial hypercholesterolemia, history of bilateral carpal tunnel release surgery who presents today for follow-up regarding:    -Acute on chronic left low back pain with intermittent radiculitis posterior lateral thigh, minimal improvement with physical therapy.  Previous MRI with disc bulge at L4-5 with left greater than right subarticular recess stenosis as well as mild central stenosis.     Plan:     A shared decision making plan was used. The patient's values and choices were respected. Prior medical records from 1/6/2021 to current were reviewed today. The following represents what was discussed and decided upon by the provider and the patient.        -DIAGNOSTIC TESTS: Images were personally reviewed and interpreted.    --Ordered updated lumbar spine MRI to further evaluate ongoing left low back pain for potential further injection versus surgical intervention.  --Lumbar spine MRI 9/17/2019 shows L4-5 left disc protrusion with mild spinal canal and left greater than right subarticular stenosis, no foraminal stenosis at any level.    -INTERVENTIONS: Could consider injection versus surgical intervention again pending imaging results.  If this bulging/nerve compression/degenerative changes have progressed would consider surgery which he is open to, however if images are relatively similar or improved would recommend repeating injections.    -MEDICATIONS: Encourage patient continue with Flexeril as needed as well as Aleve as needed.  Discussed side effects of medications and proper use. Patient verbalized understanding.    -PHYSICAL THERAPY: Encourage patient to continue with home exercises on a regular basis as tolerated.  Discussed the importance of core strengthening, ROM, stretching exercises with the patient and how each of these entities is important in decreasing pain.  Explained to the patient that the purpose of physical therapy is to teach the patient a home exercise program.  These exercises need to be performed every day in order to decrease pain and prevent future occurrences of pain.        -PATIENT EDUCATION:  Total time of 28 minutes spent with the patient, reviewing the chart, placing orders, and documenting.   -Today we also discussed the issues related to the current COVID-19 pandemic, the pros and cons of the current treatment plan, the CDC guidelines such as social distancing, washing the hands, and covering the cough.    -FOLLOW UP: Follow-up for MRI review.  Okay to call with results as well.  Advised to contact clinic if symptoms worsen or change.    Subjective:     Grady Koo is a 36 y.o. male who presents today for follow-up regarding chronic intermittent bilateral low back pain that is worse on the  left with intermittent pain into the left posterior and lateral thigh mostly that stops at the knee with some weakness in his back however he denies lower extremity weakness.  He has been working extensively with physical therapy over 20 sessions and unfortunately noticed minimal long-term benefit.  He still has intermittent pain currently is a 5/10 up to a 7 at its worst, 2 at its best more aggravated with bending, does improve with sitting and walking.  Patient otherwise denies lower extremity weakness, denies recent trips falls or balance changes, denies bowel or bladder loss control.  Denies current right leg symptoms.    -Treatment to Date: No prior spinal surgery.  Physical therapy [facility] Med[facility] program x7 sessions 12/8/2020.  Physical therapy [facility] x20 sessions 4/30/2021 chronic left LBP.     Lumbar Inj 2006 with resolution pain.  Lumbar CHRISTIANO July 2020 in [place], awaiting records for specific level and laterality completed.  Left L4-5 TFESI 10/16/2020 with significant relief initially, minimal lasting benefit.  Preprocedure pain 5/10, post 3/10.  Bilateral L4-5 TFESI 12/22/2020 with 70% relief (patient reports better relief with this injection in comparison to left only CHRISTIANO).  Preprocedure pain 3/10, post 2/10.     -Medications:  Flexeril 10 mg     Past medications:  Gabapentin 300 mg with no relief and significant side effects    Patient Active Problem List   Diagnosis     ADHD (attention deficit hyperactivity disorder)     Anxiety     Familial hypercholesterolemia     Chronic back pain       Current Outpatient Medications on File Prior to Encounter   Medication Sig Dispense Refill     cyclobenzaprine (FLEXERIL) 10 MG tablet Take 1 tablet (10 mg total) by mouth 3 (three) times a day as needed for muscle spasms. 30 tablet 1     buPROPion (WELLBUTRIN XL) 150 MG 24 hr tablet Take 1 tablet (150 mg total) by mouth daily. 90 tablet 3     dextroamphetamine-amphetamine (ADDERALL XR) 15 MG 24 hr capsule  Take 1 capsule (15 mg total) by mouth daily. 30 capsule 0     dextroamphetamine-amphetamine (ADDERALL) 10 mg Tab tablet Take 1 tablet by mouth daily. In afternoon 30 tablet 0     No current facility-administered medications on file prior to encounter.        Allergies   Allergen Reactions     Morphine Nausea And Vomiting     Penicillins        Past Medical History:   Diagnosis Date     ADHD (attention deficit hyperactivity disorder) 9/23/2016     Anxiety 9/23/2016        Review of Systems  ROS:  Specifically negative for bowel/bladder dysfunction, balance changes, headache, dizziness, foot drop, fevers, chills, appetite changes, nausea/vomiting, unexplained weight loss. Otherwise 13 systems reviewed are negative. Please see the patient's intake questionnaire from today for details.    Reviewed Social, Family, Past Medical and Past Surgical history with patient, no significant changes noted since prior visit.     Objective:     VIRTUAL PHYSICAL EXAM:   --CONSTITUTIONAL: Well developed, well nourished, healthy appearing individual.  --PSYCHIATRIC: Appropriate mood and affect. No difficulty interacting due to temper, social withdrawal, or memory issues.  --SKIN: Lumbar region is visually dry and intact.   --RESPIRATORY: Normal rhythm and effort. No abnormal accessory muscle breathing patterns noted.   --STANDING EXAMINATION:  Normal lumbar lordosis noted, no lateral shift.  --MUSCULOSKELETAL: Lumbar spine inspection reveals no evidence of deformity. Range of motion is not limited in lumbar flexion, extension, lateral rotation.   --GROSS MOTOR: Easily arises from a seated position.   --LOWER EXTREMITY MOTOR TESTING:   Full and equal bilateral active range of motion with knee extension/flexion and ankle dorsiflexion/extension to anti-gravity.     RESULTS:   Imaging: Lumbar spine imaging was reviewed today. The images were shown to the patient and the findings were explained using a spine model.      Xr Lumbar Spine 2 Or  3 Vws  Result Date: 9/9/2019  EXAM: XR LUMBAR SPINE 2 OR 3 VWS LOCATION: [facility] DATE/TIME: 9/6/2019 4:21 PM INDICATION: Low back pain COMPARISON: None.   5 nonrib-bearing lumbar type vertebral bodies. Lumbar spine lordosis is maintained. Vertebral body heights are maintained. Lateral alignment is anatomic. Intervertebral disc space heights are maintained.        Mr Lumbar Spine Without Contrast  Result Date: 9/18/2019  INDICATION: Back pain or radiculopathy, > 6 weeks. Back pain or radiculopathy. COMPARISON: 09/06/2019 radiograph. TECHNIQUE: Without IV contrast FINDINGS: Nomenclature is based on five lumbar-type vertebral bodies. Vertebral body heights are maintained. Developing Schmorl's node along the inferior endplate of L4 with reactive marrow edema. The conus tip is identified at T12-L1. Cauda equina nerve roots are  unremarkable. Posterior paraspinal soft tissues are within normal limits.   T12-L1: Normal disc height and signal. No herniation. No facet arthropathy. No spinal canal stenosis. No right neural foraminal stenosis. No left neural foraminal stenosis.   L1-L2: Normal disc height and signal. No herniation. No facet arthropathy. No spinal canal stenosis. No right neural foraminal stenosis. No left neural foraminal stenosis.   L2-L3: Normal disc height and signal. No herniation. No facet arthropathy. No spinal canal stenosis. No right neural foraminal stenosis. No left neural foraminal stenosis.   L3-L4: Mild diffuse disc bulge. No facet arthropathy. No significant spinal canal stenosis. No right neural foraminal stenosis. No left neural foraminal stenosis.   L4-L5: Central/left paracentral disc protrusion. This results in mild spinal canal and left greater than right subarticular zone stenosis. No significant foraminal narrowing. No facet arthropathy.   L5-S1: Normal disc height and signal. No herniation. No facet arthropathy. No spinal canal stenosis. No right neural foraminal stenosis. No left  neural foraminal stenosis.   1.  At L4-L5, there is a central/left paracentral disc protrusion which results in mild spinal canal and left greater than right subarticular zone stenosis.   2.  No high-grade foraminal narrowing.   3.  Developing Schmorl's node along the inferior endplate of L4 with marrow edema.         Lumbar spine x-ray 7/22/2020 [facility]  Transitional vertebra lumbar  thoracolumbar junction.  Mild list to the left.  Some sclerosis of the sacroiliac joints is noted.  Lateral x-ray shows flattening of the lumbar spine lordosis.  Irregularity at the L4-5 disc space involving superior endplate of L5.  Left oblique view shows some narrowing of the L4-5 facet.  No evidence of spondylolysis.  Right oblique view shows some narrowing of the L4-5 facet.  Facet arthropathy L5-S1.        Lumbar spine MRI 2005-[facility] [place]  Impression: Degenerative disc findings at L4-5 with posterior annular tear at L4-5, small central disc protrusion and left neuroforaminal disc protrusion.

## 2021-06-17 NOTE — PROGRESS NOTES
Progress Notes by Liane Leach PT at 4/30/2021 11:45 AM     Author: Liane Leach PT Service: -- Author Type: Physical Therapist    Filed: 7/28/2021 10:31 AM Encounter Date: 4/30/2021 Status: Addendum    : Liane Leach PT (Physical Therapist)    Related Notes: Original Note by Liane Leach PT (Physical Therapist) filed at 4/30/2021  2:00 PM       [facility] Discharge Summary    Patient Name: Grady Koo  Date: 7/28/2021  Referral Diagnosis: Back Pain  Referring provider: Brittney Mejia CNP      Patient was seen for 20 visits in PT.  See most recent PT note for updated status.     Goals Below were not assessed d/t patient not returning for further PT:  Pt. will demonstrate/verbalize independence in self-management of condition: ONGOING  Pt. will be independent with home exercise program: ONGOING  Pt will: Able to roll in bed and not wake due to pain and able to get out of bed in AM with pain 0-2/10 : was met until yesterday morning, it was significant pain getting out of bed  Pt will: Able to get up from floor with more ease as strength improves and pain levels decrease 0-2/10 : was met until yesterday      Physical Therapy will be discharged at this time.    Thank you for your referral.  Liane Leach, DPT, CLT  7/28/2021      [facility] Daily Progress     Patient Name: Grady Koo  Today's Date: 4/30/2021  Visit #: 20/22 per updated POC on 2-5-21  Date of evaluation: 11/13/2020  Referral Diagnosis: Chronic left-sided low back pain without sciatica  Referring provider: Brittney Mejia CNP  Visit Diagnosis:     ICD-10-CM    1. Chronic left-sided low back pain without sciatica  M54.5     G89.29    2. Protrusion of intervertebral disc of lumbosacral region  M51.27    3. Generalized muscle weakness  M62.81        From Evaluation:  Grady is a 35 y.o. male who presents to therapy today with complaints of ongoing intermittent  chronic bilateral low back pain that have been worse in the last couple of years on the left lumbosacral junction where he was having numbness and tingling down the left lower extremity as well.  He had gotten good relief with left L4-5 TFESI at the end of October with Dr. Schwartz but over the past week the symptoms have slowly returned  Assessment:     Overall, patient is feeling better compared to beginning of PT.   In the past 2 month since not being to PT, he states he was doing well, but the past two weeks he has started experiencing some back spasms and yesterday morning he had a significant pain in his back that caused significant impairment in function. He was able to stretch and help it feel better, but today he was still a little more sore than he was back in March 2021.   He did respond well to manual therapy today, but was sore in left lumbar musculature. He will continue to keep going with his gentle exercise routine and gradually increase his activity level again.   Patient to return to PT if he feels he needs to in the nest few weeks.       Goal Status:  Pt. will demonstrate/verbalize independence in self-management of condition: ONGOING  Pt. will be independent with home exercise program: ONGOING  Pt will: Able to roll in bed and not wake due to pain and able to get out of bed in AM with pain 0-2/10 : was met until yesterday morning, it was significant pain getting out of bed  Pt will: Able to get up from floor with more ease as strength improves and pain levels decrease 0-2/10 : was met until yesterday    Plan / Patient Education:     F/u if needed in the next 1-2 months    Subjective:     Was feeling well up until 2 weeks ago and yesterday had a significant flare up without known reason.   He also hasn't been able to get back to jogging. He tried a few times for a few seconds at a time, and he had increased back pain too much, so he reverted back to walking.     Objective:     Lumbar ROM:   ROM  on 4/30/2021 was full without pain  Date: 11/13/20 3/5/2021   *Indicate scale AROM AROM   Lumbar Flexion Hands to knees, leans to left, Pain Full ROM without pain   Lumbar Extension Unable,  pain Full ROM without pain    Right Left Right Left   Lumbar Sidebending 50% limited, pain on left Pain, unable Full ROM without pain Full ROM without pain     Slump on evaluation:   Slump: positive today on L LE, but full ROM. He feels a pull in his lower back.    Palpation: tender to left lumbar paraspinals.    Exercises:  Comment #2: treadmill for warm up  Comment #3: rotary torso   90 seconds, L,   56#  Exercise #4: prone press ups - reviewed and suggested to do during flare  Comment #6: supermans - reviewed  Exercise #11: squat - good form, no pain  Exercise #12: lunges - good form without pain today  Comment #12: reformer: standing hip abduction 2R 10x bilat  Exercise #13: side stepping skaters 30 sec  Comment #13: standing hip abduction repeats 30 sec bilat  Exercise #14: reformer: hip extension in half tall kneeling 2R 15x bilat      TREATMENT MINUTES COMMENTS   Evaluation     Self-care/ Home management  Discussion of sitting posture in car, to use towel roll during driving, especially for long strives for lumbar support, to perform gentle stretches during a flare up just like he did.   Manual therapy 14 Patient prone: left lumbar paraspinals MFR    Patient right sidelying: illiac crest depression myofacsial release. And QL release.   Neuromuscular Re-education     Therapeutic Activity     Therapeutic Exercises 10 Prone press ups x 5, cat/cow small ROM 5x, rocking into and out of child's pose 5x.     Discussion of performing this routine a few times per day for the next week, ice as needed and use a tennis ball for STM in left lower back.   Gait training     Modality__________________                Total 24    Blank areas are intentional and mean the treatment did not include these items.       Liane Leach,  PT  4/30/2021

## 2021-06-17 NOTE — PROGRESS NOTES
Assessment/Plan:     Patient presents to clinic today for establishment of care and for medication management.    1. Healthcare maintenance  No recent lab values for liver kidney functioning on file  - Comprehensive Metabolic Panel    2. Familial hypercholesterolemia  Patient's last lipid panel demonstrated LDL greater than 190 which would place patient in the high risk category for ASCVD, would recommend a statin.  Will repeat lipid panel today, the patient is amenable to statin therapy should it remain elevated.  Both parents have high cholesterol as well.  - Lipid Cascade RANDOM    3. Vitamin D deficiency  Patient was deficient in vitamin D prior to the winter months, will repeat this lab value to see if patient is worse.  Suspect we will need to replenish.  - Vitamin D, Total (25-Hydroxy)    4. Attention deficit hyperactivity disorder (ADHD), predominantly inattentive type  Changes made medication at this time, no refills necessary.  Happy to refill this medication once patient's psychological report from ADHD testing is obtained via release of information.    Discontinued Medications:  Medications Discontinued During This Encounter   Medication Reason     dextroamphetamine-amphetamine (ADDERALL XR) 25 MG 24 hr capsule      dextroamphetamine-amphetamine (ADDERALL XR) 25 MG 24 hr capsule        AVS printed and given to patient.  Return to clinic in PRN.    I have had an Advance Directives discussion with the patient.    Total time spent with patient was 25 minutes with greater than 50% spent in face-to-face counseling regarding the above plan.    This note has been dictated using voice recognition software. Any grammatical or context distortions are unintentional and inherent to the the software.     Kristen Royal MD  Family Medicine [facility]      Subjective:      Grady Koo is a 33 y.o. male who presents to clinic for establishment of care and medication check.  Patient has had his Adderall  "increased from 20-25 mg extended relief.  He was also provided with a refill of a 10 mg dose.    Patient has had many adjustments to his medication the past, feels as though he has had upon a workable regimen now.  He typically takes the 25 mg extended release essentially every day.  Occasionally he will skip a weekend day, but he really does use this daily.  Then he has a 10 mg dose that is not extended release which he uses most weekdays but avoids on most weekends.  He attempts to not use this medication every day.  He finds that he has no trouble with maintaining a good weight, he has lost weight since last appointment but he attributes this to being more active.    Old records reviewed:   Previous physical on 10/26/18    Past Medical History, Family History, and Social History reviewed.   History   Smoking Status     Never Smoker   Smokeless Tobacco     Never Used       Review of systems is as stated in HPI.  The remainder of the 10 system review is otherwise negative.    Objective:     /84  Pulse 73  Ht 6' 0.5\" (1.842 m)  Wt 191 lb (86.6 kg)  SpO2 98%  BMI 25.55 kg/m2 Body mass index is 25.55 kg/(m^2).    Gen: Alert, NAD, appears stated age, normal hygiene   Psych: no apparent hallucinations or delusions, no pressured speech; alert, oriented x3      Current Outpatient Prescriptions on File Prior to Visit   Medication Sig Dispense Refill     buPROPion (WELLBUTRIN XL) 300 MG 24 hr tablet Take 1 tablet (300 mg total) by mouth daily. 90 tablet 3     dextroamphetamine-amphetamine (ADDERALL XR) 25 MG 24 hr capsule Take 1 capsule (25 mg total) by mouth daily. 30 capsule 0     [DISCONTINUED] dextroamphetamine-amphetamine (ADDERALL XR) 25 MG 24 hr capsule Take 1 capsule (25 mg total) by mouth daily. 30 capsule 0     [DISCONTINUED] dextroamphetamine-amphetamine (ADDERALL XR) 25 MG 24 hr capsule Take 1 capsule (25 mg total) by mouth daily. 30 capsule 0     [] dextroamphetamine-amphetamine (ADDERALL) 10 " mg Tab tablet TAKE 1 TABLET BY MOUTH daily in the afternoon if needed 30 tablet 0     No current facility-administered medications on file prior to visit.

## 2021-06-17 NOTE — PATIENT INSTRUCTIONS - HE
~Please call our [facility] Nurse Navigation line [phone] with any questions or concerns about your treatment plan, if symptoms worsen and you would like to be seen urgently, or if you have problems controlling bladder and bowel function.      Importance of specialized Physical Therapy: Discussed the importance of core strengthening, ROM, stretching exercises with the patient and how each of these entities is important in decreasing pain.  Explained to the patient that the purpose of physical therapy is to teach the patient a home exercise program individualized to them and their specific health concerns.  These exercises need to be performed every day in order to decrease pain and prevent future occurrences of pain.          Imaging has been ordered. Radiology will call you to schedule. Please call below if you do not hear from them in the next couple of days.     [facility] Radiology Scheduling  Please call [phone] to schedule your image(s) (select option #1). There are 2 different locations, see below.     [facility]  [address] [address]    [place] [id number] [place] [phone]

## 2021-06-17 NOTE — TELEPHONE ENCOUNTER
Controlled Substance Refill Request  Medication Name:   Requested Prescriptions     Pending Prescriptions Disp Refills     dextroamphetamine-amphetamine (ADDERALL) 10 mg Tab tablet 30 tablet 0     Sig: Take 1 tablet by mouth daily. In afternoon     dextroamphetamine-amphetamine (ADDERALL XR) 15 MG 24 hr capsule 30 capsule 0     Sig: Take 1 capsule (15 mg total) by mouth daily.     Date Last Fill: 4/13/21  [facility]ed [facility]acy: [facility]  Submit electronically to pharmacy  Controlled Substance Agreement on file:   Encounter-Level CSA Scan Date:    There are no encounter-level csa scan date.        Last office visit:  10/20/20  Fariba Sun RN, MA  [facility]    Triage Nurse Advisor

## 2021-06-18 ENCOUNTER — OFFICE VISIT - HEALTHEAST (OUTPATIENT)
Dept: NEUROSURGERY | Facility: CLINIC | Age: 36
End: 2021-06-18

## 2021-06-18 DIAGNOSIS — R52 PAIN: ICD-10-CM

## 2021-06-18 ASSESSMENT — MIFFLIN-ST. JEOR: SCORE: 1858.39

## 2021-06-19 NOTE — LETTER
Letter by Kristen Royal MD at      Author: Kristen Royal MD Service: -- Author Type: --    Filed:  Encounter Date: 9/6/2019 Status: (Other)         [facility]/OB  09/06/19    Patient: Grady Koo  YOB: 1985  Medical Record Number: [id number]  CSN: [id number]                                                                              Non-opioid Controlled Substance Agreement    I understand that my care provider has prescribed a controlled substance to help manage my condition(s). I am taking this medicine to help me function or work. I know this is strong medicine, and that it can cause serious side effects. Controlled substances can be sedating, addicting and may cause a dependency on the drug. They can affect my ability to drive or think, and cause depression. They need to be taken exactly as prescribed. Combining controlled substances with certain medicines or chemicals (such as cocaine, sedatives and tranquilizers, sleeping pills, meth) can be dangerous or even fatal. Also, if I stop controlled substances suddenly, I may have severe withdrawal symptoms.  If not helpful, I may be asked to stop them.    The risks, benefits, and side effects of these medicine(s) were explained to me. I agree that:    1. I will take part in other treatments as advised by my care team. This may be psychiatry or counseling, physical therapy, behavioral therapy, group treatment or a referral to a pain clinic. I will reduce or stop my medicine when my care team tells me to do so.  2. I will take my medicines as prescribed. I will not change the dose or schedule unless my care team tells me to. There will be no refills if I run out early.  I may be contactedwithout warning and asked to complete a urine drug test or pill count at any time.   3. I will keep all my appointments, and understand this is part of the monitoring of controlled substances. My care team may require an office visit for  EVERY controlled substance refill. If I miss appointments or dont follow instructions, my care team may stop my medicine.  4. I will not ask other providers to prescribe controlled substances, and I will not accept controlled substances from other people. If I need another prescribed controlled substance for a new reason, I will tell my care team within 1 business day.  5. I will use one pharmacy to fill all of my controlled substance prescriptions, and it is up to me to make sure that I do not run out of my medicines on weekends or holidays. If my care team is willing to refill my controlled substance prescription without a visit, I must request refills only during office hours, refills may take up to 3 days to process, and it may take up to 5 to 7 days for my medicine to be mailed and ready at my pharmacy. Prescriptions will not be mailed anywhere except my pharmacy.    6. I am responsible for my prescriptions. If the medicine/prescription is lost or stolen, it will not be replaced. I also agree not to share controlled substance medicines with anyone.          [facility]/OB  09/06/19  Patient:  Grady Koo  YOB: 1985  Medical Record Number: [id number]  CSN: [id number]    7. I agree to not use ANY illegal or recreational drugs. This includes marijuana, cocaine, bath salts or other drugs. I agree not to use alcohol unless my care team says I may. I agree to give urine samples whenever asked. If I dont give a urine sample, the care team may stop my medicine.    8. If I enroll in the [place] Medical Marijuana program, I will tell my care team. I will also sign an agreement to share my medical records with my care team.    9. I will bring in my list of medicines (or my medicine bottles) each time I come to the clinic.   10. I will tell my care team right away if I become pregnant or have a new medical problem treated outside of my regular clinic.  11. I understand that this medicine can  affect my thinking and judgment. It may be unsafe for me to drive, use machinery and do dangerous tasks. I will not do any of these things until I know how the medicine affects me. If my dose changes, I will wait to see how it affects me. I will contact my care team if I have concerns about medicine side effects.    I understand that if I do not follow any of the conditions above, my prescriptions or treatment may be stopped.      I agree that my provider, clinic care team, and pharmacy may work with any city, state or federal law enforcement agency that investigates the misuse, sale, or other diversion of my controlled medicine. I will allow my provider to discuss my care with or share a copy of this agreement with any other treating provider, pharmacy or emergency room where I receive care. I agree to give up (waive) any right of privacy or confidentiality with respect to these consents.   I have read this agreement and have asked questions about anything I did not understand.    ___________________________________________________________________________  Patient signature - Date/Time  -Grady Koo                                      ___________________________________________________________________________  Witness signature                                                                    ___________________________________________________________________________  Provider signature- Kristen Royal MD

## 2021-06-21 NOTE — LETTER
Letter by Kristen Royal MD at      Author: Kristen Royal MD Service: -- Author Type: --    Filed:  Encounter Date: 10/20/2020 Status: (Other)         [facility]  10/20/20    Patient: Grady Koo  YOB: 1985  Medical Record Number: [id number]  CSN: [id number]                                                                              Non-opioid Controlled Substance Agreement    I understand that my care provider has prescribed a controlled substance to help manage my condition(s). I am taking this medicine to help me function or work. I know this is strong medicine, and that it can cause serious side effects. Controlled substances can be sedating, addicting and may cause a dependency on the drug. They can affect my ability to drive or think, and cause depression. They need to be taken exactly as prescribed. Combining controlled substances with certain medicines or chemicals (such as cocaine, sedatives and tranquilizers, sleeping pills, meth) can be dangerous or even fatal. Also, if I stop controlled substances suddenly, I may have severe withdrawal symptoms.  If not helpful, I may be asked to stop them.    The risks, benefits, and side effects of these medicine(s) were explained to me. I agree that:    1. I will take part in other treatments as advised by my care team. This may be psychiatry or counseling, physical therapy, behavioral therapy, group treatment or a referral to a pain clinic. I will reduce or stop my medicine when my care team tells me to do so.  2. I will take my medicines as prescribed. I will not change the dose or schedule unless my care team tells me to. There will be no refills if I run out early.  I may be contactedwithout warning and asked to complete a urine drug test or pill count at any time.   3. I will keep all my appointments, and understand this is part of the monitoring of controlled substances. My care team may require an office  visit for EVERY controlled substance refill. If I miss appointments or dont follow instructions, my care team may stop my medicine.  4. I will not ask other providers to prescribe controlled substances, and I will not accept controlled substances from other people. If I need another prescribed controlled substance for a new reason, I will tell my care team within 1 business day.  5. I will use one pharmacy to fill all of my controlled substance prescriptions, and it is up to me to make sure that I do not run out of my medicines on weekends or holidays. If my care team is willing to refill my controlled substance prescription without a visit, I must request refills only during office hours, refills may take up to 3 days to process, and it may take up to 5 to 7 days for my medicine to be mailed and ready at my pharmacy. Prescriptions will not be mailed anywhere except my pharmacy.    6. I am responsible for my prescriptions. If the medicine/prescription is lost or stolen, it will not be replaced. I also agree not to share controlled substance medicines with anyone.          [facility]  10/20/20  Patient:  Grady Koo  YOB: 1985  Medical Record Number: [id number]  CSN: [id number]    7. I agree to not use ANY illegal or recreational drugs. This includes marijuana, cocaine, bath salts or other drugs. I agree not to use alcohol unless my care team says I may. I agree to give urine samples whenever asked. If I dont give a urine sample, the care team may stop my medicine.    8. If I enroll in the [place] Medical Marijuana program, I will tell my care team. I will also sign an agreement to share my medical records with my care team.    9. I will bring in my list of medicines (or my medicine bottles) each time I come to the clinic.   10. I will tell my care team right away if I become pregnant or have a new medical problem treated outside of my regular clinic.  11. I understand that this  medicine can affect my thinking and judgment. It may be unsafe for me to drive, use machinery and do dangerous tasks. I will not do any of these things until I know how the medicine affects me. If my dose changes, I will wait to see how it affects me. I will contact my care team if I have concerns about medicine side effects.    I understand that if I do not follow any of the conditions above, my prescriptions or treatment may be stopped.      I agree that my provider, clinic care team, and pharmacy may work with any city, state or federal law enforcement agency that investigates the misuse, sale, or other diversion of my controlled medicine. I will allow my provider to discuss my care with or share a copy of this agreement with any other treating provider, pharmacy or emergency room where I receive care. I agree to give up (waive) any right of privacy or confidentiality with respect to these consents.   I have read this agreement and have asked questions about anything I did not understand.    ___________________________________________________________________________  Patient signature - Date/Time  -Grady Koo                                      ___________________________________________________________________________  Witness signature                                                                    ___________________________________________________________________________  Provider signature- Kristen Royal MD

## 2021-06-21 NOTE — PROGRESS NOTES
Assessment/Plan:     Presents to clinic for medication follow-up.    1. Attention deficit hyperactivity disorder (ADHD), predominantly inattentive type  Happy to refill medications as indicated.  Encourage patient to use the my chart function to message me to refill his meds.    2. Anxiety  Refill this medication for patient, feel it is working well.  - buPROPion (WELLBUTRIN XL) 300 MG 24 hr tablet; Take 1 tablet (300 mg total) by mouth daily.  Dispense: 90 tablet; Refill: 3      Discontinued Medications:  Medications Discontinued During This Encounter   Medication Reason     buPROPion (WELLBUTRIN XL) 300 MG 24 hr tablet Reorder       AVS printed and mailed to patient.  Return to clinic in 6 months for physical.     The following are part of a depression follow up plan for the patient:  mental health care assessment    Total time spent with patient was 15 minutes with greater than 50% spent in face-to-face counseling regarding the above plan.    This note has been dictated using voice recognition software. Any grammatical or context distortions are unintentional and inherent to the the software.     Kristen Royal MD  Family Medicine [facility]      Subjective:      Grady Koo is a 33 y.o. male who presents to clinic for 6-month follow-up on medication.    Patient has history of anxiety.  He has been treated with Wellbutrin.  He does suffer from insomnia but this is chronic.  He does not feels the Wellbutrin is causing any significant worsening insomnia.  He is tried several over-the-counter sleep aids but has not noticed any significant improvement.  Insomnia does not particularly bother him and he feels the Wellbutrin is working well.  He desires no changes in medication today.  His PHQ 9 today was 3.    Patient is also on Adderall short acting medication.  He takes 25 mg in the morning nearly every day, takes 10 mg in the afternoon as needed.  He often will skip on weekends.  He does not notice it  "affecting his appetite, although he has noticed that he has lost some weight.  He is within the healthy weight range and is not particularly concerned.      Past Medical History, Family History, and Social History reviewed.   Social History     Tobacco Use   Smoking Status Never Smoker   Smokeless Tobacco Never Used       Review of systems is as stated in HPI.  Patient endorses: none  The remainder of the 10 system review is otherwise negative.    Objective:     /82   Pulse 86   Ht 6' 0.5\" (1.842 m)   Wt 186 lb (84.4 kg)   SpO2 99%   BMI 24.88 kg/m   Body mass index is 24.88 kg/m .    Gen: Alert, NAD, appears stated age, normal hygiene   Psych: no apparent hallucinations or delusions, no pressured speech; alert, oriented x3      Current Outpatient Medications on File Prior to Visit   Medication Sig Dispense Refill     dextroamphetamine-amphetamine (ADDERALL XR) 25 MG 24 hr capsule Take 1 capsule (25 mg total) by mouth daily. 30 capsule 0     dextroamphetamine-amphetamine (ADDERALL) 10 mg Tab tablet Take 1 tablet by mouth daily. 30 tablet 0     [DISCONTINUED] buPROPion (WELLBUTRIN XL) 300 MG 24 hr tablet Take 1 tablet (300 mg total) by mouth daily. 90 tablet 3     No current facility-administered medications on file prior to visit.                "

## 2021-06-25 NOTE — TELEPHONE ENCOUNTER
----- Message from Brittney Mejia CNP sent at 6/2/2021  9:11 AM CDT -----  Please call patient and notify him that his updated images does show degenerative disc and facet joint changes again at L4-5 that looks similar to prior imaging in 2019.  There is moderate left and mild right nerve compression at this level.  We could try repeating bilateral L4-5 TFESI at any time versus surgical opinion if he so chooses.    Either would be reasonable at this point.

## 2021-06-25 NOTE — TELEPHONE ENCOUNTER
Controlled Substance Refill Request  Medication Name:   Requested Prescriptions     Pending Prescriptions Disp Refills     dextroamphetamine-amphetamine (ADDERALL) 10 mg Tab tablet 30 tablet 0     Sig: Take 1 tablet by mouth daily. In afternoon     dextroamphetamine-amphetamine (ADDERALL XR) 15 MG 24 hr capsule 30 capsule 0     Sig: Take 1 capsule (15 mg total) by mouth daily.     Date Last Fill: 5/14/21  [facility]ed [facility]acy: [facility]  Submit electronically to pharmacy  Controlled Substance Agreement on file:   Encounter-Level CSA Scan Date:    There are no encounter-level csa scan date.        Last office visit:  10/20/20

## 2021-06-25 NOTE — TELEPHONE ENCOUNTER
Call placed to patient with provider's results and recommendations.   Pt stated understanding. Pt states he would be interested in a surgical opinion at this time. Pt aware that referral will be placed and he will be contacted to schedule.

## 2021-06-26 ENCOUNTER — HEALTH MAINTENANCE LETTER (OUTPATIENT)
Age: 36
End: 2021-06-26

## 2021-06-26 NOTE — PATIENT INSTRUCTIONS - HE
Flex/ext X-rays     Neurology appt and EMG.     2nd becky from Spine center with one of the Doctors .

## 2021-06-26 NOTE — PROGRESS NOTES
Neurosurgery consultation was requested by: Brittney Mejia CNP  Pain: Lower back pain, left side worse   Radicular Pain is present: no radicular pain   Lhermitte sign: no   Motor complaints: denies weakness   Sensory complaints: numbness in bottom of both feet on and off   Gait and balance issues: denies   Bowel or bladder issues: denies   Duration of SX is: couple years   The symptoms are worse with: prolonged positions   The symptoms are better with: adjusting positions   Injury: denies   Severity is:chronic   Patient has tried the following conservative measures:Left L4L5 TFESI and had a few days of relief. He then had a bilateral L45 injection and had more relief with this one for a while longer. He did PT with minro relief.   VASYL Mcdonald    Modified Oswestry Low back Pain Disability Questionnaire    1. PAIN INTENSITY  0- I can tolerate the pain I have without having to use pain medication  2. PERSONAL CARE  1- I can take care of myself normally, but it increases my pain.   3. LIFTING  3- Pain prevents me from lifting heavy weights off the floor but I can manage light to medium weights if they are conveniently placed  4. WALKING  1- Pain prevents me from walking more than 1 mile  5. SITTING  2- Pain prevents me from sitting more than 1 hour.  6. STANDING  1- I can stand as long as I want, but it increases my pain.  7. SLEEPING  2- Even when I take medication, I sleep less than 6 hours.  8. SOCIAL LIFE  2- Pain prevents me from participating in more energetic activities (e.g., sports, dancing).  9. TRAVELING  3- My pain restricts my travel over 1 hour.  10. EMPLOYMENT/HOMEMAKING  2- I can perform most of my homemaking/job activities, but pain prevents me from performing more physically stressful activities (e.g. lifting, vacuuming).    SCORE:__17__ x2=____34%__

## 2021-06-26 NOTE — PROGRESS NOTES
The patient is a 36-year-old male. He complains of back pain. He does not have radicular pain down the legs. His back locks up with minimal activity like a misstep or sneeze or bending to put on his socks. He has back spasms.  He has numbness in his feet. He is otherwise in good health with no heart disease, lung disease, cancer, or diabetes. On MRI he has degenerative changes at L4-5. I showed him the pictures. We could think about a fusion. Steroid injection provided only minimal benefit. We could think about a discogram to see if that provokes/reproduces his pain. I reviewed the patient with Dr. Nino. She said she would not do a fusion but Dr. Dozier  might possibly do one depending on the outcome of the discogram. The patient himself is not enthusiastic about a fusion. We could get some flexion-extension views. We could get a neurology consult and EMG regarding the numb feet. We could have one of the doctors at the spine center outline a conservative treatment program. The patient could get opinions from other spine surgery groups. The patient could try other conservative treatment programs. He has tried a chiropractor which did not help. Total time 30 minutes, more than 50% spent counseling and/or coordinating care.

## 2021-06-28 ENCOUNTER — HOSPITAL ENCOUNTER (OUTPATIENT)
Dept: RADIOLOGY | Facility: HOSPITAL | Age: 36
Discharge: HOME | End: 2021-06-28
Attending: SURGERY
Payer: COMMERCIAL

## 2021-06-28 DIAGNOSIS — R52 PAIN: ICD-10-CM

## 2021-06-29 ENCOUNTER — HOSPITAL ENCOUNTER (OUTPATIENT)
Dept: PHYSICAL MEDICINE AND REHAB | Facility: CLINIC | Age: 36
Discharge: HOME | End: 2021-06-29
Attending: NURSE PRACTITIONER
Payer: COMMERCIAL

## 2021-06-29 DIAGNOSIS — G89.29 CHRONIC BILATERAL LOW BACK PAIN WITH LEFT-SIDED SCIATICA: ICD-10-CM

## 2021-06-29 DIAGNOSIS — M48.061 STENOSIS OF LATERAL RECESS OF LUMBAR SPINE: ICD-10-CM

## 2021-06-29 DIAGNOSIS — M54.16 LUMBAR RADICULOPATHY: ICD-10-CM

## 2021-06-29 DIAGNOSIS — M51.26 PROTRUSION OF LUMBAR INTERVERTEBRAL DISC: ICD-10-CM

## 2021-06-29 DIAGNOSIS — M54.42 CHRONIC BILATERAL LOW BACK PAIN WITH LEFT-SIDED SCIATICA: ICD-10-CM

## 2021-06-29 DIAGNOSIS — M51.369 DDD (DEGENERATIVE DISC DISEASE), LUMBAR: ICD-10-CM

## 2021-07-03 NOTE — ADDENDUM NOTE
Addendum Note by Kristen Fan MD at 9/6/2019  3:30 PM     Author: Kristen Fan MD Service: -- Author Type: Physician    Filed: 9/9/2019  9:30 AM Encounter Date: 9/6/2019 Status: Signed    : Kristen Fan MD (Physician)    Addended by: KRISTEN FAN on: 9/9/2019 09:30 AM        Modules accepted: Orders

## 2021-07-03 NOTE — ADDENDUM NOTE
Addendum Note by Bernadette Vasques DO at 1/24/2018  1:44 PM     Author: Bernadette Vasques DO Service: -- Author Type: Physician    Filed: 1/24/2018  1:44 PM Encounter Date: 1/24/2018 Status: Signed    : Bernadette Vasques DO (Physician)    Addended by: BERNADETTE VASQUES on: 1/24/2018 01:44 PM        Modules accepted: Orders

## 2021-07-04 NOTE — PROGRESS NOTES
Progress Notes by Brittney Mejia CNP at 6/29/2021  3:20 PM     Author: Brittney Mejia CNP Service: -- Author Type: Nurse Practitioner    Filed: 6/29/2021  3:50 PM Date of Service: 6/29/2021  3:20 PM Status: Signed    : Brittney Mejia CNP (Nurse Practitioner)       Grady Koo is a 36 y.o. male who is being evaluated via a billable video visit.      How would you like to obtain your AVS? MyChart.    Video Start Time: 3:18 PM    Video-Visit Details    Type of service:  Video Visit    Video End Time (time video stopped): 3:40 PM  Originating Location (pt. Location): Home    Distant Location (provider location):  [facility]     Platform used for Video Visit: Multispan    Assessment:     Diagnoses and all orders for this visit:    Chronic bilateral low back pain with left-sided sciatica  -     OPS TFESI Lumbar Bilateral; Future; Expected date: 06/29/2021  -     Ambulatory referral to Neurosurgery    Lumbar radiculopathy  -     OPS TFESI Lumbar Bilateral; Future; Expected date: 06/29/2021  -     Ambulatory referral to Neurosurgery    Stenosis of lateral recess of lumbar spine  -     OPS TFESI Lumbar Bilateral; Future; Expected date: 06/29/2021  -     Ambulatory referral to Neurosurgery    Protrusion of lumbar intervertebral disc  -     OPS TFESI Lumbar Bilateral; Future; Expected date: 06/29/2021  -     Ambulatory referral to Neurosurgery    DDD (degenerative disc disease), lumbar  -     OPS TFESI Lumbar Bilateral; Future; Expected date: 06/29/2021  -     Ambulatory referral to Neurosurgery       Grady Koo is a 36 y.o. y.o. male with past medical history significant for anxiety, ADHD, familial hypercholesterolemia, history of bilateral carpal tunnel release surgery who presents today for follow-up regarding:    -Chronic bilateral low back pain left greater than right with lumbar radiculopathy left greater than right with short-term relief with previous  injections.  Updated MRI with degenerative changes at L4-5 with lateral recess stenosis.     Plan:     A shared decision making plan was used. The patient's values and choices were respected. Prior medical records from 5/19/2021 to current were reviewed today. The following represents what was discussed and decided upon by the provider and the patient.        -DIAGNOSTIC TESTS: Images were personally reviewed and interpreted.   --Lumbar spine MRI 6/1/2021 with T2 hyperintense edematous endplate changes L4-5 on the left with annular tear and disc protrusion with moderate left lateral recess stenosis.  --Lumbar flexion-extension x-ray 6/28/2021 shows no instability.  --Lumbar spine MRI 9/17/2019 shows L4-5 left disc protrusion with mild spinal canal and left greater than right subarticular stenosis, no foraminal stenosis at any level.    -Second surgical referral placed again, patient would like to see Dr. Nino for a surgical consultation.    -INTERVENTIONS: Ordered repeat bilateral L4-5 transforaminal epidural steroid injection.    Patient reports better relief previously with bilateral L4-5 TFESI versus left only L4-5 TFESI.  -Consider discogram down the road potentially considering surgical intervention if no benefit with the injection, pending Dr. Nino's recommendation.    -MEDICATIONS: Continue with Flexeril at this time.  -Patient prefers not to try other medications at this time.  Discussed side effects of medications and proper use. Patient verbalized understanding.    -PHYSICAL THERAPY: Continue with physical therapy home exercises which he is doing from extensive prior physical therapy sessions.  Discussed the importance of core strengthening, ROM, stretching exercises with the patient and how each of these entities is important in decreasing pain.  Explained to the patient that the purpose of physical therapy is to teach the patient a home exercise program.  These exercises need to be performed every day in  order to decrease pain and prevent future occurrences of pain.        -PATIENT EDUCATION:  Total time of 38 minutes spent with the patient, reviewing the chart, placing orders, and documenting.   -Today we also discussed the issues related to the current COVID-19 pandemic, the pros and cons of the current treatment plan, the CDC guidelines such as social distancing, washing the hands, and covering the cough.    -FOLLOW UP: Follow-up for injection with Dr. Schwartz, as well as surgical opinion with Dr. Nino.  Advised to contact clinic if symptoms worsen or change.    Subjective:     Grady Koo is a 36 y.o. male who presents today for follow-up regarding chronic intermittent low bilateral low back pain that is been progressive on the left, currently back pain is more intense but he is having occasional lower extremity symptoms into the left lateral hip and buttock as well as numbness and tingling bilateral feet.  Patient currently reports that his pain is a constant 5/10, 9 at its worse first thing in the morning as well as towards the evening hours.    Patient is otherwise very active person and is very frustrated with how his life is been totally transformed in the last 2 years because of the severity of his back pain and so far has gotten no benefit with extensive physical therapy as well as short-term relief only with the injections.    Patient denies lower extremity weakness, denies recent trips or falls or balance changes, denies bowel or bladder loss control.    *Patient was evaluated by Dr. Lemons [facility] neurosurgeon 6/18/2021, who did discuss case with Dr. Nino as well who reported potential consideration for fusion.    -Treatment to Date: No prior spinal surgery.  Physical therapy [facility] Lumbar [facility] program x7 sessions 12/8/2020.  Physical therapy optimum x20 sessions 4/30/2021 chronic left LBP.     Lumbar Inj 2006 with resolution pain.  Lumbar CHRISTIANO July 2020 in [place], awaiting records  for specific level and laterality completed.  Left L4-5 TFESI 10/16/2020 with significant relief initially, minimal lasting benefit.  Preprocedure pain 5/10, post 3/10.  Bilateral L4-5 TFESI 12/22/2020 with 70% relief (patient reports better relief with this injection in comparison to left only CHRISTIANO).  Preprocedure pain 3/10, post 2/10.     -Medications:  Flexeril 10 mg     Past medications:  Gabapentin 300 mg with no relief and significant side effects    Patient Active Problem List   Diagnosis   ? ADHD (attention deficit hyperactivity disorder)   ? Anxiety   ? Familial hypercholesterolemia   ? Chronic back pain       Current Outpatient Medications on File Prior to Encounter   Medication Sig Dispense Refill   ? buPROPion (WELLBUTRIN XL) 150 MG 24 hr tablet Take 1 tablet (150 mg total) by mouth daily. 90 tablet 3   ? cyclobenzaprine (FLEXERIL) 10 MG tablet Take 1 tablet (10 mg total) by mouth 3 (three) times a day as needed for muscle spasms. 30 tablet 1   ? dextroamphetamine-amphetamine (ADDERALL XR) 15 MG 24 hr capsule Take 1 capsule (15 mg total) by mouth daily. 30 capsule 0   ? dextroamphetamine-amphetamine (ADDERALL) 10 mg Tab tablet Take 1 tablet by mouth daily. In afternoon 30 tablet 0     No current facility-administered medications on file prior to encounter.        Allergies   Allergen Reactions   ? Morphine Nausea And Vomiting   ? Penicillins        Past Medical History:   Diagnosis Date   ? ADHD (attention deficit hyperactivity disorder) 9/23/2016   ? Anxiety 9/23/2016        Review of Systems  ROS: Positive for numbness and tingling bilateral lower extremities.  Specifically negative for bowel/bladder dysfunction, balance changes, headache, dizziness, foot drop, fevers, chills, appetite changes, nausea/vomiting, unexplained weight loss. Otherwise 13 systems reviewed are negative. Please see the patient's intake questionnaire from today for details.    Reviewed Social, Family, Past Medical and Past  Surgical history with patient, no significant changes noted since prior visit.     Objective:     VIRTUAL PHYSICAL EXAM:   --CONSTITUTIONAL: Well developed, well nourished, healthy appearing individual.  --PSYCHIATRIC: Appropriate mood and affect. No difficulty interacting due to temper, social withdrawal, or memory issues.  --SKIN: Lumbar region is visually dry and intact.   --RESPIRATORY: Normal rhythm and effort. No abnormal accessory muscle breathing patterns noted.       RESULTS:   Imaging: Lumbar spine imaging was reviewed today. The images were shown to the patient and the findings were explained using a spine model.      Mr Lumbar Spine Without Contrast  Result Date: 6/1/2021  [facility] MR LUMBAR SPINE WO CONTRAST 6/1/2021 2:45 PM INDICATION: Back pain or radiculopathy, > 6 wks. Chronic left low back pain with lumbar radiculitis.  Please compare with 2019 imaging. TECHNIQUE: Routine. CONTRAST: None. COMPARISON: Lumbar spine MRI 09/17/2019. FINDINGS: Nomenclature is based on 5 lumbar type vertebral bodies. Normal vertebral body heights. Normal alignment.  The visualized portions of the bony pelvis are unremarkable for age. T2 hyperintense edematous endplate changes centrally and on the left at L4-L5. No pars defect. The conus tip is identified at T12-L1. Unremarkable paraspinal soft tissues.   T12-L1: Normal disc height and signal. No herniation. Normal facets. No spinal canal or neural foraminal stenosis.   L1-L2: Normal disc height and signal. No herniation. Normal facets. No spinal canal or neural foraminal stenosis.   L2-L3: Normal disc height and signal. No herniation. Normal facets. No spinal canal or neural foraminal stenosis.   L3-L4: Normal disc height and signal. Minor annular bulging and facet arthropathy. No stenosis.   L4-L5: Mild loss of disc height and signal. Mild circumferential disc bulge. Central annular tear and small protrusion. Mild to moderate facet  arthropathy. Moderate left and mild right lateral recess narrowing. Mild spinal canal narrowing. Low-grade foraminal narrowing. No significant interval change. L5-S1: Normal disc height and signal. No herniation. Minor facet arthropathy. No stenosis.   CONCLUSION:   1.  Mild to moderate disc, endplate and facet degeneration at L4-L5 results in moderate left and mild right lateral recess narrowing with overall mild spinal canal stenosis that does not appear significantly changed versus 2019.   2.  Less pronounced degenerative changes at L3-L4 and L5-S1 without stenosis.      Xr Lumbar Spine Flex And Ext 2 Or 3 Vws  Result Date: 6/29/2021  EXAM: XR LUMBAR SPINE FLEX AND EXT 2 OR 3 VWS LOCATION: [facility] DATE/TIME: 6/28/2021 1:25 PM INDICATION: Low back pain COMPARISON: MRI lumbar spine 06/01/2021 TECHNIQUE: CR Lumbar Spine.   Nomenclature assumes 5 lumbar type vertebral bodies. No evidence of dynamic instability between flexion and extension. No acute fracture within the visualized portions.      Xr Lumbar Spine 2 Or 3 Vws  Result Date: 9/9/2019  EXAM: XR LUMBAR SPINE 2 OR 3 VWS LOCATION: [facility] DATE/TIME: 9/6/2019 4:21 PM INDICATION: Low back pain COMPARISON: None.   5 nonrib-bearing lumbar type vertebral bodies. Lumbar spine lordosis is maintained. Vertebral body heights are maintained. Lateral alignment is anatomic. Intervertebral disc space heights are maintained.        Mr Lumbar Spine Without Contrast  Result Date: 9/18/2019  INDICATION: Back pain or radiculopathy, > 6 weeks. Back pain or radiculopathy. COMPARISON: 09/06/2019 radiograph. TECHNIQUE: Without IV contrast FINDINGS: Nomenclature is based on five lumbar-type vertebral bodies. Vertebral body heights are maintained. Developing Schmorl's node along the inferior endplate of L4 with reactive marrow edema. The conus tip is identified at T12-L1. Cauda equina nerve roots are  unremarkable. Posterior paraspinal soft tissues  are within normal limits.   T12-L1: Normal disc height and signal. No herniation. No facet arthropathy. No spinal canal stenosis. No right neural foraminal stenosis. No left neural foraminal stenosis.   L1-L2: Normal disc height and signal. No herniation. No facet arthropathy. No spinal canal stenosis. No right neural foraminal stenosis. No left neural foraminal stenosis.   L2-L3: Normal disc height and signal. No herniation. No facet arthropathy. No spinal canal stenosis. No right neural foraminal stenosis. No left neural foraminal stenosis.   L3-L4: Mild diffuse disc bulge. No facet arthropathy. No significant spinal canal stenosis. No right neural foraminal stenosis. No left neural foraminal stenosis.   L4-L5: Central/left paracentral disc protrusion. This results in mild spinal canal and left greater than right subarticular zone stenosis. No significant foraminal narrowing. No facet arthropathy.   L5-S1: Normal disc height and signal. No herniation. No facet arthropathy. No spinal canal stenosis. No right neural foraminal stenosis. No left neural foraminal stenosis.   1.  At L4-L5, there is a central/left paracentral disc protrusion which results in mild spinal canal and left greater than right subarticular zone stenosis.   2.  No high-grade foraminal narrowing.   3.  Developing Schmorl's node along the inferior endplate of L4 with marrow edema.         Lumbar spine x-ray 7/22/2020 [facility]  Transitional vertebra lumbar  thoracolumbar junction.  Mild list to the left.  Some sclerosis of the sacroiliac joints is noted.  Lateral x-ray shows flattening of the lumbar spine lordosis.  Irregularity at the L4-5 disc space involving superior endplate of L5.  Left oblique view shows some narrowing of the L4-5 facet.  No evidence of spondylolysis.  Right oblique view shows some narrowing of the L4-5 facet.  Facet arthropathy L5-S1.        Lumbar spine MRI 2005-[facility]  Impression: Degenerative disc  findings at L4-5 with posterior annular tear at L4-5, small central disc protrusion and left neuroforaminal disc protrusion.

## 2021-07-04 NOTE — PATIENT INSTRUCTIONS - HE
Patient Instructions by Brittney Mejia CNP at 6/29/2021  3:20 PM     Author: Brittney Mejia CNP Service: -- Author Type: Nurse Practitioner    Filed: 6/29/2021  3:44 PM Date of Service: 6/29/2021  3:20 PM Status: Signed    : Brittney Mejia CNP (Nurse Practitioner)       ~Please call our [facility] Nurse Navigation line [phone] with any questions or concerns about your treatment plan, if symptoms worsen and you would like to be seen urgently, or if you have problems controlling bladder and bowel function.      Importance of specialized Physical Therapy: Discussed the importance of core strengthening, ROM, stretching exercises with the patient and how each of these entities is important in decreasing pain.  Explained to the patient that the purpose of physical therapy is to teach the patient a home exercise program individualized to them and their specific health concerns.  These exercises need to be performed every day in order to decrease pain and prevent future occurrences of pain.          A bilateral L4-5 transforaminal epidural steroid injection has been ordered today to potentially help with your pain symptoms. These injections do not fix what is going on in your back, therefore they typically do not take away the pain completely, however they can many times help improve symptoms. Injections should always be completed along with other modalities such as physical therapy for the best long term outcomes. If injections alone are done, then pain will likely return.     [facility] Injection Requirements      A  is required for all fluoroscopically-guided injections.    Injection appointments may be cancelled if there are signs/symptoms of an active infection or if the patient is being actively treated with antibiotics for a diagnosed infection.    Patients may have their steroid injection cancelled if they have had another steroid injection  within 2 weeks.    Diabetic patients will have their blood glucose levels checked the day of their injection and the appointment will be rescheduled if the blood glucose level is 300 or higher.    Patients with allergies to cortisone, local anesthetics, iodine, or contrast dye should contact the [facility] Center to further discuss these considerations.    Patients scheduled for medial branch block diagnostic injections should refrain from taking pain medication the day of the procedure.  The medial branch block injection appointment will be rescheduled if the patient's pain rating is not 5/10 or greater at the time of the procedure.    Patients taking warfarin/Coumadin will have their INR checked the day of the procedure and the procedure may be rescheduled if the INR is greater than 3.0.    Please contact the [facility] Center (#[phone]) if you are taking any prescription blood-thinning medications (warfarin, Plavix, Lovenox, Eliquis, Brilinta, Effient, etc.) as special dosing adjustments may need to be made depending on the type of injection you are scheduled to receive.    It is recommended that you delay having your steroid injection if you have received a flu shot or shingles vaccine within 2 weeks.        You have been referred to [facility] for surgical consult.  They will call you to schedule an appointment at the [facility]ine Center.    Neurosurgery Scheduling phone #: [phone]

## 2021-07-06 VITALS
WEIGHT: 195 LBS | SYSTOLIC BLOOD PRESSURE: 134 MMHG | HEART RATE: 70 BPM | DIASTOLIC BLOOD PRESSURE: 84 MMHG | BODY MASS INDEX: 25.84 KG/M2 | HEIGHT: 73 IN | OXYGEN SATURATION: 98 %

## 2021-07-23 ENCOUNTER — ANCILLARY PROCEDURE (OUTPATIENT)
Dept: PHYSICAL MEDICINE AND REHAB | Facility: CLINIC | Age: 36
End: 2021-07-23
Payer: COMMERCIAL

## 2021-07-23 VITALS
TEMPERATURE: 98.3 F | SYSTOLIC BLOOD PRESSURE: 116 MMHG | DIASTOLIC BLOOD PRESSURE: 82 MMHG | OXYGEN SATURATION: 96 % | RESPIRATION RATE: 81 BRPM | HEART RATE: 64 BPM

## 2021-07-23 DIAGNOSIS — M54.42 CHRONIC BILATERAL LOW BACK PAIN WITH LEFT-SIDED SCIATICA: ICD-10-CM

## 2021-07-23 DIAGNOSIS — G89.29 CHRONIC BILATERAL LOW BACK PAIN WITH LEFT-SIDED SCIATICA: ICD-10-CM

## 2021-07-23 DIAGNOSIS — M51.26 PROTRUSION OF LUMBAR INTERVERTEBRAL DISC: ICD-10-CM

## 2021-07-23 DIAGNOSIS — M48.061 STENOSIS OF LATERAL RECESS OF LUMBAR SPINE: ICD-10-CM

## 2021-07-23 DIAGNOSIS — M51.369 DDD (DEGENERATIVE DISC DISEASE), LUMBAR: ICD-10-CM

## 2021-07-23 DIAGNOSIS — M54.16 LUMBAR RADICULOPATHY: ICD-10-CM

## 2021-07-23 PROCEDURE — 64483 NJX AA&/STRD TFRM EPI L/S 1: CPT | Mod: 50 | Performed by: PAIN MEDICINE

## 2021-07-23 RX ORDER — DEXAMETHASONE SODIUM PHOSPHATE 10 MG/ML
INJECTION, SOLUTION INTRAMUSCULAR; INTRAVENOUS
Status: COMPLETED | OUTPATIENT
Start: 2021-07-23 | End: 2021-07-23

## 2021-07-23 RX ORDER — LIDOCAINE HYDROCHLORIDE 10 MG/ML
INJECTION, SOLUTION EPIDURAL; INFILTRATION; INTRACAUDAL; PERINEURAL
Status: COMPLETED | OUTPATIENT
Start: 2021-07-23 | End: 2021-07-23

## 2021-07-23 RX ADMIN — LIDOCAINE HYDROCHLORIDE 1 ML: 10 INJECTION, SOLUTION EPIDURAL; INFILTRATION; INTRACAUDAL; PERINEURAL at 15:05

## 2021-07-23 RX ADMIN — DEXAMETHASONE SODIUM PHOSPHATE 20 MG: 10 INJECTION, SOLUTION INTRAMUSCULAR; INTRAVENOUS at 15:05

## 2021-07-23 ASSESSMENT — PAIN SCALES - GENERAL
PAINLEVEL: MODERATE PAIN (4)
PAINLEVEL: MODERATE PAIN (4)

## 2021-07-23 NOTE — PATIENT INSTRUCTIONS
DISCHARGE INSTRUCTIONS    During office hours (8:00 a.m.- 4:00 p.m.) questions or concerns may be answered  by calling [facility]ine Center Navigation Nurses at  [phone].  Messages received after hours will be returned the following business day.      In the case of an emergency, please dial 911 or seek assistance at the nearest Emergency Room/Urgent Care facility.     All Patients:  ? You may experience an increase in your symptoms for the first 2 days (It may take anywhere between 2 days- 2 weeks for the steroid to have maximum effect).    ? You may use ice on the injection site, as frequently as 20 minutes each hour if needed.    ? You may take your pain medicine.    ? You may continue taking your regular medication.    ? You may shower. No swimming, tub bath or hot tub for 48 hours.  You may remove your bandaid/bandage as soon as you are home.    ? You may resume light activities, as tolerated.    ? Resume your usual diet as tolerated.    ? It is strongly advised that you do not drive for 1-3 hours post injection.    ? If you have had oral sedation:  Do not drive for 8 hours post injection.      ? If you have had IV sedation:  Do not drive for 24 hours post injection.  Do not operate hazardous machinery or make important personal/business decisions for 24 hours.    POSSIBLE STEROID SIDE EFFECTS (If steroid/cortisone was used for your procedure)    -If you experience these symptoms, it should only last for a short period      Swelling of the legs                Skin redness (flushing)       Mouth (oral) irritation     Blood sugar (glucose) levels              Sweats                     Mood changes    Headache    Weakened immune system for up to 14 days, which could increase the risk of radha the COVID-19 virus and/or experiencing more severe symptoms of the disease, if exposed.         POSSIBLE PROCEDURE SIDE EFFECTS    -Call the Spine Center if you are concerned      Increased Pain             Increased  numbness/tingling        Nausea/Vomiting            Bruising/bleeding at site        Redness or swelling                                                Difficulty walking        Weakness            Fever greater than 100.5    *In the event of a severe headache after an epidural steroid injection that is relieved by lying down, please call the [facility] to speak with a clinical staff member*

## 2021-07-29 ENCOUNTER — OFFICE VISIT (OUTPATIENT)
Dept: NEUROSURGERY | Facility: CLINIC | Age: 36
End: 2021-07-29
Payer: COMMERCIAL

## 2021-07-29 VITALS
BODY MASS INDEX: 25.84 KG/M2 | WEIGHT: 195 LBS | SYSTOLIC BLOOD PRESSURE: 131 MMHG | DIASTOLIC BLOOD PRESSURE: 93 MMHG | HEIGHT: 73 IN | HEART RATE: 62 BPM | OXYGEN SATURATION: 100 %

## 2021-07-29 DIAGNOSIS — R52 PAIN: Primary | ICD-10-CM

## 2021-07-29 PROCEDURE — 99215 OFFICE O/P EST HI 40 MIN: CPT | Performed by: NEUROLOGICAL SURGERY

## 2021-07-29 ASSESSMENT — MIFFLIN-ST. JEOR: SCORE: 1858.39

## 2021-07-29 NOTE — NURSING NOTE
Neurosurgery consultation was requested by: Brittney Mejia CNP  Pain: Lower back pain, left side worse   Radicular Pain is present: no radicular pain   Lhermitte sign: no   Motor complaints: denies weakness   Sensory complaints: numbness in bottom of both feet on and off   Gait and balance issues: denies   Bowel or bladder issues: denies   Duration of SX is: couple years   The symptoms are worse with: prolonged positions   The symptoms are better with: adjusting positions   Injury: denies   Severity is:chronic   Patient has tried the following conservative measures:Left L4L5 TFESI and had a few days of relief. He then had a bilateral L45 injection and had more relief with this one for a while longer. He did PT with minro relief.   Harry,CMA

## 2021-07-29 NOTE — LETTER
7/29/2021         RE: Grady Koo  [address] [facility] [address]        Dear Colleague,    Thank you for referring your patient, Grady Koo, to the [facility]. Please see a copy of my visit note below.    I had the pleasure of seeing Grady Koo in consultation in my neurosurgery clinic for back pain he was valuable in for Dr. Lemons. He has had this back pain for about 2 years because of spasms he denies any radiculopathy with that he is had injections which do not seem to help he is done physical therapy and continues to exercise to the best of his abilities and keep in good shape.    On his imaging he has L4-5 with degenerative disc disease and disc bulge and annular tear at this level for full details please refer to the radiology dictation in patient electronic medical record    I did review this as well as his options with him in detail. Given his young age I would not recommend fusion although he may have some improvement in his back pain but he would not be at high risk for adjacent segment degeneration. He would be a good candidate for lumbar disc arthroplasty however since his pain has improved slightly and over the last year I would recommend that he hold off on surgery until it starts affecting his quality of life and activities daily living. He was in agreement with this plan. He will call us as needed.  Thank you for giving me the opportunity to see this very pleasant patient.  If you have any questions about him or any other patients please feel free to contact me.  Of note I spent greater than 40 minutes counseling the patient regarding his pathology and treatment plan, greater than 50 percent of which was in face to face  Counseling.    Tony Dozier MD            Again, thank you for allowing me to participate in the care of your patient.        Sincerely,        Tony Dozier MD

## 2021-07-29 NOTE — PROGRESS NOTES
I had the pleasure of seeing Grady Koo in consultation in my neurosurgery clinic for back pain he was valuable in for Dr. Lemons. He has had this back pain for about 2 years because of spasms he denies any radiculopathy with that he is had injections which do not seem to help he is done physical therapy and continues to exercise to the best of his abilities and keep in good shape.    On his imaging he has L4-5 with degenerative disc disease and disc bulge and annular tear at this level for full details please refer to the radiology dictation in patient electronic medical record    I did review this as well as his options with him in detail. Given his young age I would not recommend fusion although he may have some improvement in his back pain but he would not be at high risk for adjacent segment degeneration. He would be a good candidate for lumbar disc arthroplasty however since his pain has improved slightly and over the last year I would recommend that he hold off on surgery until it starts affecting his quality of life and activities daily living. He was in agreement with this plan. He will call us as needed.  Thank you for giving me the opportunity to see this very pleasant patient.  If you have any questions about him or any other patients please feel free to contact me.  Of note I spent greater than 40 minutes counseling the patient regarding his pathology and treatment plan, greater than 50 percent of which was in face to face  Counseling.    Tony Dozier MD

## 2021-08-05 ENCOUNTER — OFFICE VISIT (OUTPATIENT)
Dept: FAMILY MEDICINE | Facility: CLINIC | Age: 36
End: 2021-08-05
Payer: COMMERCIAL

## 2021-08-05 VITALS
OXYGEN SATURATION: 98 % | DIASTOLIC BLOOD PRESSURE: 74 MMHG | SYSTOLIC BLOOD PRESSURE: 110 MMHG | WEIGHT: 202 LBS | BODY MASS INDEX: 26.65 KG/M2 | HEART RATE: 65 BPM

## 2021-08-05 DIAGNOSIS — G89.29 CHRONIC MIDLINE LOW BACK PAIN WITHOUT SCIATICA: ICD-10-CM

## 2021-08-05 DIAGNOSIS — F90.9 ATTENTION DEFICIT HYPERACTIVITY DISORDER (ADHD), UNSPECIFIED ADHD TYPE: Primary | ICD-10-CM

## 2021-08-05 DIAGNOSIS — M54.50 CHRONIC MIDLINE LOW BACK PAIN WITHOUT SCIATICA: ICD-10-CM

## 2021-08-05 DIAGNOSIS — M70.41 PREPATELLAR BURSITIS OF RIGHT KNEE: ICD-10-CM

## 2021-08-05 PROCEDURE — 99214 OFFICE O/P EST MOD 30 MIN: CPT | Performed by: FAMILY MEDICINE

## 2021-08-05 RX ORDER — DEXTROAMPHETAMINE SACCHARATE, AMPHETAMINE ASPARTATE MONOHYDRATE, DEXTROAMPHETAMINE SULFATE AND AMPHETAMINE SULFATE 3.75; 3.75; 3.75; 3.75 MG/1; MG/1; MG/1; MG/1
15 CAPSULE, EXTENDED RELEASE ORAL DAILY
Qty: 30 CAPSULE | Refills: 0 | Status: SHIPPED | OUTPATIENT
Start: 2021-08-05 | End: 2021-09-13

## 2021-08-05 RX ORDER — DEXTROAMPHETAMINE SACCHARATE, AMPHETAMINE ASPARTATE, DEXTROAMPHETAMINE SULFATE AND AMPHETAMINE SULFATE 2.5; 2.5; 2.5; 2.5 MG/1; MG/1; MG/1; MG/1
10 TABLET ORAL DAILY
Qty: 30 TABLET | Refills: 0 | Status: SHIPPED | OUTPATIENT
Start: 2021-08-05 | End: 2021-09-13

## 2021-08-05 NOTE — PATIENT INSTRUCTIONS
Patient Education     Understanding Prepatellar Bursitis     A bursa is a thin, slippery, sac-like film. It contains a small amount of fluid. This structure is found between bones and soft tissues in and around joints. A bursa cushions and protects a joint. It keeps parts of a joint from rubbing against each other.   The prepatellar bursa is found on top of the kneecap (patella). It lies just under the skin. If this bursa becomes irritated and inflamed, the condition is called prepatellar bursitis.   Causes of prepatellar bursitis   A common cause of this problem is repeated kneeling on the floor. For this reason, it is sometimes called  s knee. Workers such as plumbers, carpet layers, roofers, and gardeners are at risk. Injury from a blow to your kneecap can also cause it. Athletes in sports such as football and wrestling are at a greater risk. Running on uneven ground may also play a role.   Symptoms of prepatellar bursitis   These may include:    Knee pain that gets worse with bending or pressure to the knee and gets better with rest    Swelling over the kneecap    Tenderness or warmth over the kneecap    Crackling sound from the kneecap with movement  Treatment for prepatellar bursitis   This problem often gets better with rest and medicines. Other treatments may be needed. Possible treatments include:     Resting your knee. This allows the area to heal. It includes avoiding things that trigger symptoms.    Prescription or over-the-counter medicines. These help reduce pain and swelling. NSAIDs (nonsteroidal anti-inflammatory drugs) are the most common medicines used. Medicines may be prescribed or bought over the counter. They may be given as pills. Or they may be put on the skin as a gel, cream, or patch.    Stretching and strengthening exercises. These help improve the strength and flexibility of the muscles around the knee.    Cold packs or heat packs. These help reduce pain and  swelling.    Physical therapy. This may include exercises, ultrasound, or other treatments.    Kneepads. These help protect your knees during sports.    Injection of medicine into the bursa or drainage of fluid from the bursa. These may help relieve symptoms. The medicine is usually a corticosteroid. This is a strong anti-inflammatory medicine.  For symptoms that don t get better with these treatments, surgery to remove the bursa may help.   Possible complications     If germs get into the bursa through a cut in your skin, the bursa may become infected. An infection is generally treated with antibiotic medicine. In some cases, the infected bursa must be removed.    If your knee isn t given time to heal, this problem may become long-term (chronic). This can lead to trouble moving the knee joint.    When to call your healthcare provider   Call your healthcare provider right away if you have:     Fever of 100.4 F (38 C) or higher, or as directed by your provider    Chills    Increased swelling or warmth of the area, or drainage from the area    Symptoms that don t get better or get worse    New symptoms  [employer] last reviewed this educational content on 6/1/2019 2000-2021 [employer]. All rights reserved. This information is not intended as a substitute for professional medical care. Always follow your healthcare professional's instructions.

## 2021-08-05 NOTE — LETTER
[facility]  08/05/21  Patient: Grady Koo  YOB: 1985  Medical Record Number: [id number]                                                                                  Non-Opioid Controlled Substance Agreement    This is an agreement between you and your provider regarding safe and appropriate use of controlled substances prescribed by your care team. Controlled substances are?medicines that can cause physical and mental dependence (abuse).     There are strict laws about having and using these medicines. We here at [facility] are  committed to working with you in your efforts to get better. To support you in this work, we'll help you schedule regular office appointments for medicine refills. If we must cancel or change your appointment for any reason, we'll make sure you have enough medicine to last until your next appointment.     As a Provider, I will:     Listen carefully to your concerns while treating you with respect.     Recommend a treatment plan that I believe is in your best interest and may involve therapies other than medicine.      Talk with you often about the possible benefits and the risk of harm of any medicine that we prescribe for you.    Assess the safety of this medicine and check how well it works.      Provide a plan on how to taper (discontinue or go off) using this medicine if the decision is made to stop its use.      ::  As a Patient, I understand controlled substances:       Are prescribed by my care provider to help me function or work and manage my condition(s).?    Are strong medicines and can cause serious side effects.       Need to be taken exactly as prescribed.?Combining controlled substances with certain medicines or chemicals (such as illegal drugs, alcohol, sedatives, sleeping pills, and benzodiazepines) can be dangerous or even fatal.? If I stop taking my medicines suddenly, I may have severe withdrawal symptoms.     The  risks, benefits, and side effects of these medicine(s) were explained to me. I agree that:    1. I will take part in other treatments as advised by my care team. This may be psychiatry or counseling, physical therapy, behavioral therapy, group treatment or a referral to specialist.    2. I will keep all my appointments and understand this is part of the monitoring of controlled substances.?My care team may require an office visit for EVERY controlled substance refill. If I miss appointments or don t follow instructions, my care team may stop my medicine    3. I will take my medicines as prescribed. I will not change the dose or schedule unless my care team tells me to. There will be no refills if I run out early.      4. I may be asked to come to the clinic and complete a urine drug test or complete a pill count. If I don t give a urine sample or participate in a pill count, the care team may stop my medicine.    5. I will only receive controlled substance prescriptions from this clinic. If I am treated by another provider, I will tell them that I am taking controlled substances and that I have a treatment agreement with this provider. I will inform my [facility] care team within one business day if I am given a prescription for any controlled substance by another healthcare provider. My [facility] care team can contact other providers and pharmacists about my use of any medicines.    6. It is up to me to make sure that I don't run out of my medicines on weekends or holidays.?If my care team is willing to refill my prescription without a visit, I must request refills only during office hours. Refills may take up to 3 business days to process. I will use one pharmacy to fill all my controlled substance prescriptions. I will notify the clinic about any changes to my insurance or medicine availability.    7. I am responsible for my prescriptions. If the medicine/prescription is lost, stolen or destroyed,  it will not be replaced.?I also agree not to share controlled substance medicines with anyone.     8. I am aware I should not use any illegal or recreational drugs. I agree not to drink alcohol unless my care team says I can.     9. If I enroll in the [place] [facility] Cannabis program, I will tell my care team before my next refill.    10. I will tell my care team right away if I become pregnant, have a new medical problem treated outside of my regular clinic, or have a change in my medicines.     11. I understand that this medicine can affect my thinking, judgment and reaction time.? Alcohol and drugs affect the brain and body, which can affect the safety of my driving. Being under the influence of alcohol or drugs can affect my decision-making, behaviors, personal safety and the safety of others. Driving while impaired (DWI) can occur if a person is driving, operating or in physical control of a car, motorcycle, boat, snowmobile, ATV, motorbike, off-road vehicle or any other motor vehicle ([place] Statute 169A.20). I understand the risk if I choose to drive or operate any vehicle or machinery.    I understand that if I do not follow any of the conditions above, my prescriptions or treatment may be stopped or changed.   I agree that my provider, clinic care team and pharmacy may work with any city, state or federal law enforcement agency that investigates the misuse, sale or other diversion of my controlled medicine. I will allow my provider to discuss my care with, or share a copy of, this agreement with any other treating provider, pharmacy or emergency room where I receive care.     I have read this agreement and have asked questions about anything I did not understand.    ________________________________________________________  Patient Signature - Grady Koo     ___________________                   Date     ________________________________________________________  Provider Signature - Kristen Royal,  MD       ___________________                   Date     ________________________________________________________  Witness Signature (required if provider not present while patient signing)          ___________________                   Date

## 2021-08-05 NOTE — PROGRESS NOTES
"    Assessment & Plan     Attention deficit hyperactivity disorder (ADHD), unspecified ADHD type  Trolled substance agreement signed.  Refilled patient's medications.  - amphetamine-dextroamphetamine (ADDERALL) 10 MG tablet  Dispense: 30 tablet; Refill: 0  - amphetamine-dextroamphetamine (ADDERALL XR) 15 MG 24 hr capsule  Dispense: 30 capsule; Refill: 0    Prepatellar bursitis of right knee  Assurance provided and a handout was provided.    Chronic midline low back pain without sciatica  After extensive discussion about additional spinal surgical referral or a medical marijuana certification, patient shows the pain clinic and I think he would be an excellent candidate for some sort of a spinal procedure in order to diminish daily pain.  - Pain Management Referral       BMI:   Estimated body mass index is 26.65 kg/m  as calculated from the following:    Height as of 7/29/21: 1.854 m (6' 1\").    Weight as of this encounter: 91.6 kg (202 lb).         No follow-ups on file.    Kristen Royal MD  [facility] VITA Rasmussen is a 36 year old who presents for the following health issues     HPI   Patient presents to clinic for renewal of controlled substance agreement.  He desires no changes in medication dose.  He never asks for early refills.  He never loses his medication.  He takes as prescribed.  He will occasionally take a drug holiday.  He believes the long-acting medication works much better and likes it for daily use, but does occasionally use the 10 mg dose for abuse in the afternoon during particularly difficult days.  He desires no changes in his medication today.    Patient also notices the swelling in his right knee.  It is inferior to the patella.  It is improved today but still present.  It developed a week ago.  Patient does occasionally crawl around on his knees in order to do household projects or work.    Only, patient I discussed his chronic back pain.  He has tried " several different opinions within the spinal surgical community and is attempting to forestall the fusion for as long as possible.  He wonders if there are additional options.  He has tried gabapentin in the past but did not take the medication frequently as it was frustrating to him.  He has not tried other medications and is hoping to avoid narcotics.  He does not use medical marijuana.  It is dramatically impacting his life and preventing him from exercising and performing his favorite activities.    Review of Systems         Objective    /74   Pulse 65   Wt 91.6 kg (202 lb)   SpO2 98%   BMI 26.65 kg/m    Body mass index is 26.65 kg/m .  Physical Exam   Gen: NAD  Psych: Normal affect, no hallucinations or delusions, not tearful  MSK; right-sided inferior patellar edema the feels spongy and is consistent with bursitis

## 2021-08-25 ENCOUNTER — DOCUMENTATION ONLY (OUTPATIENT)
Dept: PALLIATIVE MEDICINE | Facility: OTHER | Age: 36
End: 2021-08-25

## 2021-08-25 NOTE — TELEPHONE ENCOUNTER
Patient referred to the [place] pain center.  Also actively working with the spine center.  One of the referral questions was out of medical cannabis.  I have been communicating with provider in that regard.  He will not be scheduled here at this time.

## 2021-09-13 ENCOUNTER — MYC REFILL (OUTPATIENT)
Dept: FAMILY MEDICINE | Facility: CLINIC | Age: 36
End: 2021-09-13

## 2021-09-13 DIAGNOSIS — F90.9 ATTENTION DEFICIT HYPERACTIVITY DISORDER (ADHD), UNSPECIFIED ADHD TYPE: ICD-10-CM

## 2021-09-14 RX ORDER — DEXTROAMPHETAMINE SACCHARATE, AMPHETAMINE ASPARTATE MONOHYDRATE, DEXTROAMPHETAMINE SULFATE AND AMPHETAMINE SULFATE 3.75; 3.75; 3.75; 3.75 MG/1; MG/1; MG/1; MG/1
15 CAPSULE, EXTENDED RELEASE ORAL DAILY
Qty: 30 CAPSULE | Refills: 0 | Status: SHIPPED | OUTPATIENT
Start: 2021-09-14 | End: 2021-10-14

## 2021-09-14 RX ORDER — DEXTROAMPHETAMINE SACCHARATE, AMPHETAMINE ASPARTATE, DEXTROAMPHETAMINE SULFATE AND AMPHETAMINE SULFATE 2.5; 2.5; 2.5; 2.5 MG/1; MG/1; MG/1; MG/1
10 TABLET ORAL DAILY
Qty: 30 TABLET | Refills: 0 | Status: SHIPPED | OUTPATIENT
Start: 2021-09-14 | End: 2021-10-14

## 2021-09-14 NOTE — TELEPHONE ENCOUNTER
Routing refill request to provider for review/approval because:  Controlled medication refill request.  Routing to provider's care team.    Last Written Prescription:        Last office visit provider:  8/5/21       Requested Prescriptions   Pending Prescriptions Disp Refills     amphetamine-dextroamphetamine (ADDERALL) 10 MG tablet 30 tablet 0     Sig: Take 1 tablet (10 mg) by mouth daily       There is no refill protocol information for this order        amphetamine-dextroamphetamine (ADDERALL XR) 15 MG 24 hr capsule 30 capsule 0     Sig: Take 1 capsule (15 mg) by mouth daily       There is no refill protocol information for this order            Eula Newman RN 09/13/21 7:28 PM

## 2021-10-11 ENCOUNTER — HEALTH MAINTENANCE LETTER (OUTPATIENT)
Age: 36
End: 2021-10-11

## 2021-10-12 ENCOUNTER — MYC MEDICAL ADVICE (OUTPATIENT)
Dept: FAMILY MEDICINE | Facility: CLINIC | Age: 36
End: 2021-10-12

## 2021-10-12 DIAGNOSIS — F41.9 ANXIETY: ICD-10-CM

## 2021-10-12 RX ORDER — BUPROPION HYDROCHLORIDE 150 MG/1
150 TABLET ORAL DAILY
Qty: 90 TABLET | Refills: 3 | Status: SHIPPED | OUTPATIENT
Start: 2021-10-12 | End: 2022-08-18

## 2021-10-14 ENCOUNTER — MYC REFILL (OUTPATIENT)
Dept: FAMILY MEDICINE | Facility: CLINIC | Age: 36
End: 2021-10-14

## 2021-10-14 DIAGNOSIS — F90.9 ATTENTION DEFICIT HYPERACTIVITY DISORDER (ADHD), UNSPECIFIED ADHD TYPE: ICD-10-CM

## 2021-10-14 RX ORDER — DEXTROAMPHETAMINE SACCHARATE, AMPHETAMINE ASPARTATE, DEXTROAMPHETAMINE SULFATE AND AMPHETAMINE SULFATE 2.5; 2.5; 2.5; 2.5 MG/1; MG/1; MG/1; MG/1
10 TABLET ORAL DAILY
Qty: 30 TABLET | Refills: 0 | Status: SHIPPED | OUTPATIENT
Start: 2021-10-14 | End: 2021-11-12

## 2021-10-14 RX ORDER — DEXTROAMPHETAMINE SACCHARATE, AMPHETAMINE ASPARTATE MONOHYDRATE, DEXTROAMPHETAMINE SULFATE AND AMPHETAMINE SULFATE 3.75; 3.75; 3.75; 3.75 MG/1; MG/1; MG/1; MG/1
15 CAPSULE, EXTENDED RELEASE ORAL DAILY
Qty: 30 CAPSULE | Refills: 0 | Status: SHIPPED | OUTPATIENT
Start: 2021-10-14 | End: 2021-11-12

## 2021-11-12 ENCOUNTER — MYC REFILL (OUTPATIENT)
Dept: FAMILY MEDICINE | Facility: CLINIC | Age: 36
End: 2021-11-12
Payer: COMMERCIAL

## 2021-11-12 DIAGNOSIS — F90.9 ATTENTION DEFICIT HYPERACTIVITY DISORDER (ADHD), UNSPECIFIED ADHD TYPE: ICD-10-CM

## 2021-11-15 RX ORDER — DEXTROAMPHETAMINE SACCHARATE, AMPHETAMINE ASPARTATE MONOHYDRATE, DEXTROAMPHETAMINE SULFATE AND AMPHETAMINE SULFATE 3.75; 3.75; 3.75; 3.75 MG/1; MG/1; MG/1; MG/1
15 CAPSULE, EXTENDED RELEASE ORAL DAILY
Qty: 30 CAPSULE | Refills: 0 | Status: SHIPPED | OUTPATIENT
Start: 2021-11-15 | End: 2021-12-13

## 2021-11-15 RX ORDER — DEXTROAMPHETAMINE SACCHARATE, AMPHETAMINE ASPARTATE, DEXTROAMPHETAMINE SULFATE AND AMPHETAMINE SULFATE 2.5; 2.5; 2.5; 2.5 MG/1; MG/1; MG/1; MG/1
10 TABLET ORAL DAILY
Qty: 30 TABLET | Refills: 0 | Status: SHIPPED | OUTPATIENT
Start: 2021-11-15 | End: 2021-12-13

## 2021-11-15 NOTE — TELEPHONE ENCOUNTER
Routing refill request to provider for review/approval because:  Drug not on the G refill protocol - controlled substance    Last Written Prescription Date:  10/14/2021  Last Fill Quantity: 30 of 10 mg and 30 of 15 mg ,  # refills: 0   Last office visit provider:  8/5/2021 Dr. Royal     Requested Prescriptions   Pending Prescriptions Disp Refills     amphetamine-dextroamphetamine (ADDERALL) 10 MG tablet 30 tablet 0     Sig: Take 1 tablet (10 mg) by mouth daily       There is no refill protocol information for this order        amphetamine-dextroamphetamine (ADDERALL XR) 15 MG 24 hr capsule 30 capsule 0     Sig: Take 1 capsule (15 mg) by mouth daily       There is no refill protocol information for this order          Kelly Felipe RN 11/14/21 6:58 PM

## 2021-12-05 ENCOUNTER — HEALTH MAINTENANCE LETTER (OUTPATIENT)
Age: 36
End: 2021-12-05

## 2021-12-13 ENCOUNTER — MYC REFILL (OUTPATIENT)
Dept: FAMILY MEDICINE | Facility: CLINIC | Age: 36
End: 2021-12-13
Payer: COMMERCIAL

## 2021-12-13 DIAGNOSIS — F90.9 ATTENTION DEFICIT HYPERACTIVITY DISORDER (ADHD), UNSPECIFIED ADHD TYPE: ICD-10-CM

## 2021-12-13 RX ORDER — DEXTROAMPHETAMINE SACCHARATE, AMPHETAMINE ASPARTATE, DEXTROAMPHETAMINE SULFATE AND AMPHETAMINE SULFATE 2.5; 2.5; 2.5; 2.5 MG/1; MG/1; MG/1; MG/1
10 TABLET ORAL DAILY
Qty: 30 TABLET | Refills: 0 | Status: SHIPPED | OUTPATIENT
Start: 2021-12-13 | End: 2022-01-17

## 2021-12-13 RX ORDER — DEXTROAMPHETAMINE SACCHARATE, AMPHETAMINE ASPARTATE MONOHYDRATE, DEXTROAMPHETAMINE SULFATE AND AMPHETAMINE SULFATE 3.75; 3.75; 3.75; 3.75 MG/1; MG/1; MG/1; MG/1
15 CAPSULE, EXTENDED RELEASE ORAL DAILY
Qty: 30 CAPSULE | Refills: 0 | Status: SHIPPED | OUTPATIENT
Start: 2021-12-13 | End: 2022-01-17

## 2022-01-14 ENCOUNTER — MYC MEDICAL ADVICE (OUTPATIENT)
Dept: FAMILY MEDICINE | Facility: CLINIC | Age: 37
End: 2022-01-14
Payer: COMMERCIAL

## 2022-01-14 DIAGNOSIS — F90.9 ATTENTION DEFICIT HYPERACTIVITY DISORDER (ADHD), UNSPECIFIED ADHD TYPE: ICD-10-CM

## 2022-01-17 RX ORDER — DEXTROAMPHETAMINE SACCHARATE, AMPHETAMINE ASPARTATE, DEXTROAMPHETAMINE SULFATE AND AMPHETAMINE SULFATE 2.5; 2.5; 2.5; 2.5 MG/1; MG/1; MG/1; MG/1
10 TABLET ORAL DAILY
Qty: 30 TABLET | Refills: 0 | Status: SHIPPED | OUTPATIENT
Start: 2022-01-17 | End: 2022-02-16

## 2022-01-17 RX ORDER — DEXTROAMPHETAMINE SACCHARATE, AMPHETAMINE ASPARTATE MONOHYDRATE, DEXTROAMPHETAMINE SULFATE AND AMPHETAMINE SULFATE 3.75; 3.75; 3.75; 3.75 MG/1; MG/1; MG/1; MG/1
15 CAPSULE, EXTENDED RELEASE ORAL DAILY
Qty: 30 CAPSULE | Refills: 0 | Status: SHIPPED | OUTPATIENT
Start: 2022-01-17 | End: 2022-02-16

## 2022-02-14 ENCOUNTER — MYC REFILL (OUTPATIENT)
Dept: FAMILY MEDICINE | Facility: CLINIC | Age: 37
End: 2022-02-14
Payer: COMMERCIAL

## 2022-02-14 DIAGNOSIS — F90.9 ATTENTION DEFICIT HYPERACTIVITY DISORDER (ADHD), UNSPECIFIED ADHD TYPE: ICD-10-CM

## 2022-02-16 ENCOUNTER — MYC REFILL (OUTPATIENT)
Dept: FAMILY MEDICINE | Facility: CLINIC | Age: 37
End: 2022-02-16
Payer: COMMERCIAL

## 2022-02-16 DIAGNOSIS — F90.9 ATTENTION DEFICIT HYPERACTIVITY DISORDER (ADHD), UNSPECIFIED ADHD TYPE: ICD-10-CM

## 2022-02-16 NOTE — TELEPHONE ENCOUNTER
Routing refill request to provider for review/approval because:  Drug not on the G refill protocol   amphetamine-dextroamphetamine (ADDERALL XR) 15 MG 24 hr capsule 30 capsule 0 1/17/2022  No   Sig - Route: Take 1 capsule (15 mg) by mouth daily - Oral   LAST OV-8/5/21

## 2022-02-17 RX ORDER — DEXTROAMPHETAMINE SACCHARATE, AMPHETAMINE ASPARTATE, DEXTROAMPHETAMINE SULFATE AND AMPHETAMINE SULFATE 2.5; 2.5; 2.5; 2.5 MG/1; MG/1; MG/1; MG/1
10 TABLET ORAL DAILY
Qty: 30 TABLET | Refills: 0 | Status: SHIPPED | OUTPATIENT
Start: 2022-02-17 | End: 2022-09-16

## 2022-02-17 RX ORDER — DEXTROAMPHETAMINE SACCHARATE, AMPHETAMINE ASPARTATE MONOHYDRATE, DEXTROAMPHETAMINE SULFATE AND AMPHETAMINE SULFATE 3.75; 3.75; 3.75; 3.75 MG/1; MG/1; MG/1; MG/1
15 CAPSULE, EXTENDED RELEASE ORAL DAILY
Qty: 30 CAPSULE | Refills: 0 | Status: SHIPPED | OUTPATIENT
Start: 2022-02-17 | End: 2022-08-18

## 2022-02-18 RX ORDER — DEXTROAMPHETAMINE SACCHARATE, AMPHETAMINE ASPARTATE MONOHYDRATE, DEXTROAMPHETAMINE SULFATE AND AMPHETAMINE SULFATE 3.75; 3.75; 3.75; 3.75 MG/1; MG/1; MG/1; MG/1
15 CAPSULE, EXTENDED RELEASE ORAL DAILY
Qty: 30 CAPSULE | Refills: 0 | Status: SHIPPED | OUTPATIENT
Start: 2022-02-18 | End: 2022-03-17

## 2022-02-18 RX ORDER — DEXTROAMPHETAMINE SACCHARATE, AMPHETAMINE ASPARTATE, DEXTROAMPHETAMINE SULFATE AND AMPHETAMINE SULFATE 2.5; 2.5; 2.5; 2.5 MG/1; MG/1; MG/1; MG/1
10 TABLET ORAL DAILY
Qty: 30 TABLET | Refills: 0 | Status: SHIPPED | OUTPATIENT
Start: 2022-02-18 | End: 2022-03-17

## 2022-02-23 ENCOUNTER — MYC MEDICAL ADVICE (OUTPATIENT)
Dept: PHYSICAL MEDICINE AND REHAB | Facility: CLINIC | Age: 37
End: 2022-02-23
Payer: COMMERCIAL

## 2022-02-23 DIAGNOSIS — M51.26 PROTRUSION OF LUMBAR INTERVERTEBRAL DISC: ICD-10-CM

## 2022-02-23 DIAGNOSIS — M51.369 DDD (DEGENERATIVE DISC DISEASE), LUMBAR: ICD-10-CM

## 2022-02-23 DIAGNOSIS — M54.16 LUMBAR RADICULOPATHY: Primary | ICD-10-CM

## 2022-03-10 ENCOUNTER — RADIOLOGY INJECTION OFFICE VISIT (OUTPATIENT)
Dept: PHYSICAL MEDICINE AND REHAB | Facility: CLINIC | Age: 37
End: 2022-03-10
Attending: NURSE PRACTITIONER
Payer: COMMERCIAL

## 2022-03-10 VITALS
SYSTOLIC BLOOD PRESSURE: 122 MMHG | OXYGEN SATURATION: 91 % | HEART RATE: 58 BPM | TEMPERATURE: 97.8 F | DIASTOLIC BLOOD PRESSURE: 84 MMHG

## 2022-03-10 DIAGNOSIS — M51.26 PROTRUSION OF LUMBAR INTERVERTEBRAL DISC: ICD-10-CM

## 2022-03-10 DIAGNOSIS — M51.369 DDD (DEGENERATIVE DISC DISEASE), LUMBAR: ICD-10-CM

## 2022-03-10 DIAGNOSIS — M54.16 LUMBAR RADICULOPATHY: ICD-10-CM

## 2022-03-10 PROCEDURE — 64483 NJX AA&/STRD TFRM EPI L/S 1: CPT | Mod: 50 | Performed by: PAIN MEDICINE

## 2022-03-10 RX ORDER — DEXAMETHASONE SODIUM PHOSPHATE 10 MG/ML
INJECTION, SOLUTION INTRAMUSCULAR; INTRAVENOUS
Status: COMPLETED | OUTPATIENT
Start: 2022-03-10 | End: 2022-03-10

## 2022-03-10 RX ADMIN — DEXAMETHASONE SODIUM PHOSPHATE 20 MG: 10 INJECTION, SOLUTION INTRAMUSCULAR; INTRAVENOUS at 10:20

## 2022-03-10 ASSESSMENT — PAIN SCALES - GENERAL
PAINLEVEL: MODERATE PAIN (4)
PAINLEVEL: MODERATE PAIN (4)

## 2022-03-10 NOTE — PATIENT INSTRUCTIONS
DISCHARGE INSTRUCTIONS    During office hours (8:00 a.m.- 4:00 p.m.) questions or concerns may be answered  by calling [facility] Nurses at  [phone].  Messages received after hours will be returned the following business day.      In the case of an emergency, please dial 911 or seek assistance at the nearest Emergency Room/Urgent Care facility.     All Patients:  ? You may experience an increase in your symptoms for the first 2 days (It may take anywhere between 2 days- 2 weeks for the steroid to have maximum effect).    ? You may use ice on the injection site, as frequently as 20 minutes each hour if needed.    ? You may take your pain medicine.    ? You may continue taking your regular medication.    ? You may shower. No swimming, tub bath or hot tub for 48 hours.  You may remove your bandaid/bandage as soon as you are home.    ? You may resume light activities, as tolerated.    ? Resume your usual diet as tolerated.    ? It is strongly advised that you do not drive for 1-3 hours post injection.    ? If you have had oral sedation:  Do not drive for 8 hours post injection.      ? If you have had IV sedation:  Do not drive for 24 hours post injection.  Do not operate hazardous machinery or make important personal/business decisions for 24 hours.    POSSIBLE STEROID SIDE EFFECTS (If steroid/cortisone was used for your procedure)    -If you experience these symptoms, it should only last for a short period      Swelling of the legs                Skin redness (flushing)       Mouth (oral) irritation     Blood sugar (glucose) levels              Sweats                     Mood changes    Headache    Weakened immune system for up to 14 days, which could increase the risk of radha the COVID-19 virus and/or experiencing more severe symptoms of the disease, if exposed.         POSSIBLE PROCEDURE SIDE EFFECTS    -Call the [facility] if you are concerned      Increased Pain             Increased  numbness/tingling        Nausea/Vomiting            Bruising/bleeding at site        Redness or swelling                                                Difficulty walking        Weakness            Fever greater than 100.5    *In the event of a severe headache after an epidural steroid injection that is relieved by lying down, please call the [facility] to speak with a clinical staff member*

## 2022-03-17 ENCOUNTER — MYC REFILL (OUTPATIENT)
Dept: FAMILY MEDICINE | Facility: CLINIC | Age: 37
End: 2022-03-17
Payer: COMMERCIAL

## 2022-03-17 DIAGNOSIS — F90.9 ATTENTION DEFICIT HYPERACTIVITY DISORDER (ADHD), UNSPECIFIED ADHD TYPE: ICD-10-CM

## 2022-03-17 RX ORDER — DEXTROAMPHETAMINE SACCHARATE, AMPHETAMINE ASPARTATE MONOHYDRATE, DEXTROAMPHETAMINE SULFATE AND AMPHETAMINE SULFATE 3.75; 3.75; 3.75; 3.75 MG/1; MG/1; MG/1; MG/1
15 CAPSULE, EXTENDED RELEASE ORAL DAILY
Qty: 30 CAPSULE | Refills: 0 | Status: SHIPPED | OUTPATIENT
Start: 2022-03-17 | End: 2022-04-16

## 2022-03-17 RX ORDER — DEXTROAMPHETAMINE SACCHARATE, AMPHETAMINE ASPARTATE, DEXTROAMPHETAMINE SULFATE AND AMPHETAMINE SULFATE 2.5; 2.5; 2.5; 2.5 MG/1; MG/1; MG/1; MG/1
10 TABLET ORAL DAILY
Qty: 30 TABLET | Refills: 0 | Status: SHIPPED | OUTPATIENT
Start: 2022-03-17 | End: 2022-04-16

## 2022-04-16 ENCOUNTER — MYC REFILL (OUTPATIENT)
Dept: FAMILY MEDICINE | Facility: CLINIC | Age: 37
End: 2022-04-16
Payer: COMMERCIAL

## 2022-04-16 DIAGNOSIS — F90.9 ATTENTION DEFICIT HYPERACTIVITY DISORDER (ADHD), UNSPECIFIED ADHD TYPE: ICD-10-CM

## 2022-04-18 RX ORDER — DEXTROAMPHETAMINE SACCHARATE, AMPHETAMINE ASPARTATE MONOHYDRATE, DEXTROAMPHETAMINE SULFATE AND AMPHETAMINE SULFATE 3.75; 3.75; 3.75; 3.75 MG/1; MG/1; MG/1; MG/1
15 CAPSULE, EXTENDED RELEASE ORAL DAILY
Qty: 30 CAPSULE | Refills: 0 | Status: SHIPPED | OUTPATIENT
Start: 2022-04-18 | End: 2022-05-16

## 2022-04-18 RX ORDER — DEXTROAMPHETAMINE SACCHARATE, AMPHETAMINE ASPARTATE, DEXTROAMPHETAMINE SULFATE AND AMPHETAMINE SULFATE 2.5; 2.5; 2.5; 2.5 MG/1; MG/1; MG/1; MG/1
10 TABLET ORAL DAILY
Qty: 30 TABLET | Refills: 0 | Status: SHIPPED | OUTPATIENT
Start: 2022-04-18 | End: 2022-05-16

## 2022-04-18 NOTE — TELEPHONE ENCOUNTER
Routing refill request to provider for review/approval because:  Drug not on the G refill protocol   Controlled substance    Last Written Prescription Date:  03/17/2022   Last Fill Quantity: 30,  # refills: 0   Last office visit provider:  Dr. Royal on 08/05/2021     Requested Prescriptions   Pending Prescriptions Disp Refills     amphetamine-dextroamphetamine (ADDERALL XR) 15 MG 24 hr capsule 30 capsule 0     Sig: Take 1 capsule (15 mg) by mouth daily       There is no refill protocol information for this order        amphetamine-dextroamphetamine (ADDERALL) 10 MG tablet 30 tablet 0     Sig: Take 1 tablet (10 mg) by mouth daily       There is no refill protocol information for this order          Ramona Currie RN 04/18/22 2:09 PM

## 2022-05-16 ENCOUNTER — MYC REFILL (OUTPATIENT)
Dept: FAMILY MEDICINE | Facility: CLINIC | Age: 37
End: 2022-05-16
Payer: COMMERCIAL

## 2022-05-16 DIAGNOSIS — F90.9 ATTENTION DEFICIT HYPERACTIVITY DISORDER (ADHD), UNSPECIFIED ADHD TYPE: ICD-10-CM

## 2022-05-17 RX ORDER — DEXTROAMPHETAMINE SACCHARATE, AMPHETAMINE ASPARTATE MONOHYDRATE, DEXTROAMPHETAMINE SULFATE AND AMPHETAMINE SULFATE 3.75; 3.75; 3.75; 3.75 MG/1; MG/1; MG/1; MG/1
15 CAPSULE, EXTENDED RELEASE ORAL DAILY
Qty: 30 CAPSULE | Refills: 0 | Status: SHIPPED | OUTPATIENT
Start: 2022-05-17 | End: 2022-06-16

## 2022-05-17 RX ORDER — DEXTROAMPHETAMINE SACCHARATE, AMPHETAMINE ASPARTATE, DEXTROAMPHETAMINE SULFATE AND AMPHETAMINE SULFATE 2.5; 2.5; 2.5; 2.5 MG/1; MG/1; MG/1; MG/1
10 TABLET ORAL DAILY
Qty: 30 TABLET | Refills: 0 | Status: SHIPPED | OUTPATIENT
Start: 2022-05-17 | End: 2022-06-16

## 2022-05-17 NOTE — TELEPHONE ENCOUNTER
Routing refill request to provider for review/approval because:  Drug not on the [facility] refill protocol     Last Written Prescription Date:  4/18/22  Last Fill Quantity: 30,  # refills: 0   Last office visit provider:  8/5/21     Requested Prescriptions   Pending Prescriptions Disp Refills     amphetamine-dextroamphetamine (ADDERALL XR) 15 MG 24 hr capsule 30 capsule 0     Sig: Take 1 capsule (15 mg) by mouth daily       There is no refill protocol information for this order        amphetamine-dextroamphetamine (ADDERALL) 10 MG tablet 30 tablet 0     Sig: Take 1 tablet (10 mg) by mouth daily       There is no refill protocol information for this order          Lupe Orellana, RN 05/17/22 8:14 AM

## 2022-05-17 NOTE — TELEPHONE ENCOUNTER
Please call patient and ask him to schedule a follow-up visit.  He should be seen every 6 months for renewal of his Adderall.  I sent over this month supply to his pharmacy however will need to be seen by a provider prior to the next refill.  Thank you

## 2022-06-16 ENCOUNTER — MYC REFILL (OUTPATIENT)
Dept: FAMILY MEDICINE | Facility: CLINIC | Age: 37
End: 2022-06-16
Payer: COMMERCIAL

## 2022-06-16 DIAGNOSIS — F90.9 ATTENTION DEFICIT HYPERACTIVITY DISORDER (ADHD), UNSPECIFIED ADHD TYPE: ICD-10-CM

## 2022-06-17 ENCOUNTER — MYC MEDICAL ADVICE (OUTPATIENT)
Dept: FAMILY MEDICINE | Facility: CLINIC | Age: 37
End: 2022-06-17
Payer: COMMERCIAL

## 2022-06-17 NOTE — TELEPHONE ENCOUNTER
Routing refill request to provider for review/approval because:  Drug not on the [facility] refill protocol     Last Written Prescription Date:  5/17/22  Last Fill Quantity: 30,  # refills: 0   Last office visit provider:  8/5/21     Requested Prescriptions   Pending Prescriptions Disp Refills     amphetamine-dextroamphetamine (ADDERALL XR) 15 MG 24 hr capsule 30 capsule 0     Sig: Take 1 capsule (15 mg) by mouth daily       There is no refill protocol information for this order        amphetamine-dextroamphetamine (ADDERALL) 10 MG tablet 30 tablet 0     Sig: Take 1 tablet (10 mg) by mouth daily       There is no refill protocol information for this order          Lupe Orellana, RN 06/17/22 4:38 PM

## 2022-06-20 NOTE — TELEPHONE ENCOUNTER
My chart sent. Rerouting refill request to [facility] to address as it has been requested for the past 4 days now.    Closing encounter and will forward refill request.     Washington ADAMS RN

## 2022-06-21 RX ORDER — DEXTROAMPHETAMINE SACCHARATE, AMPHETAMINE ASPARTATE MONOHYDRATE, DEXTROAMPHETAMINE SULFATE AND AMPHETAMINE SULFATE 3.75; 3.75; 3.75; 3.75 MG/1; MG/1; MG/1; MG/1
15 CAPSULE, EXTENDED RELEASE ORAL DAILY
Qty: 30 CAPSULE | Refills: 0 | Status: SHIPPED | OUTPATIENT
Start: 2022-06-21 | End: 2022-07-15

## 2022-06-21 RX ORDER — DEXTROAMPHETAMINE SACCHARATE, AMPHETAMINE ASPARTATE, DEXTROAMPHETAMINE SULFATE AND AMPHETAMINE SULFATE 2.5; 2.5; 2.5; 2.5 MG/1; MG/1; MG/1; MG/1
10 TABLET ORAL DAILY
Qty: 30 TABLET | Refills: 0 | Status: SHIPPED | OUTPATIENT
Start: 2022-06-21 | End: 2022-07-15

## 2022-07-15 ENCOUNTER — MYC REFILL (OUTPATIENT)
Dept: FAMILY MEDICINE | Facility: CLINIC | Age: 37
End: 2022-07-15

## 2022-07-15 DIAGNOSIS — F90.9 ATTENTION DEFICIT HYPERACTIVITY DISORDER (ADHD), UNSPECIFIED ADHD TYPE: ICD-10-CM

## 2022-07-18 RX ORDER — DEXTROAMPHETAMINE SACCHARATE, AMPHETAMINE ASPARTATE, DEXTROAMPHETAMINE SULFATE AND AMPHETAMINE SULFATE 2.5; 2.5; 2.5; 2.5 MG/1; MG/1; MG/1; MG/1
10 TABLET ORAL DAILY
Qty: 30 TABLET | Refills: 0 | Status: SHIPPED | OUTPATIENT
Start: 2022-07-18 | End: 2022-08-15

## 2022-07-18 RX ORDER — DEXTROAMPHETAMINE SACCHARATE, AMPHETAMINE ASPARTATE MONOHYDRATE, DEXTROAMPHETAMINE SULFATE AND AMPHETAMINE SULFATE 3.75; 3.75; 3.75; 3.75 MG/1; MG/1; MG/1; MG/1
15 CAPSULE, EXTENDED RELEASE ORAL DAILY
Qty: 30 CAPSULE | Refills: 0 | Status: SHIPPED | OUTPATIENT
Start: 2022-07-18 | End: 2022-08-15

## 2022-08-11 ASSESSMENT — ENCOUNTER SYMPTOMS
JOINT SWELLING: 0
SORE THROAT: 0
DIARRHEA: 0
FREQUENCY: 0
HEMATURIA: 0
DYSURIA: 0
CHILLS: 0
PALPITATIONS: 0
WEAKNESS: 0
PARESTHESIAS: 0
FEVER: 0
HEADACHES: 0
COUGH: 0
CONSTIPATION: 0
MYALGIAS: 0
DIZZINESS: 0
NERVOUS/ANXIOUS: 0
EYE PAIN: 0
ARTHRALGIAS: 0
HEARTBURN: 0
NAUSEA: 0
ABDOMINAL PAIN: 0
HEMATOCHEZIA: 0
SHORTNESS OF BREATH: 0

## 2022-08-15 ENCOUNTER — MYC REFILL (OUTPATIENT)
Dept: FAMILY MEDICINE | Facility: CLINIC | Age: 37
End: 2022-08-15

## 2022-08-15 DIAGNOSIS — F90.9 ATTENTION DEFICIT HYPERACTIVITY DISORDER (ADHD), UNSPECIFIED ADHD TYPE: ICD-10-CM

## 2022-08-16 RX ORDER — DEXTROAMPHETAMINE SACCHARATE, AMPHETAMINE ASPARTATE MONOHYDRATE, DEXTROAMPHETAMINE SULFATE AND AMPHETAMINE SULFATE 3.75; 3.75; 3.75; 3.75 MG/1; MG/1; MG/1; MG/1
15 CAPSULE, EXTENDED RELEASE ORAL DAILY
Qty: 30 CAPSULE | Refills: 0 | Status: SHIPPED | OUTPATIENT
Start: 2022-08-16 | End: 2022-08-18

## 2022-08-16 RX ORDER — DEXTROAMPHETAMINE SACCHARATE, AMPHETAMINE ASPARTATE, DEXTROAMPHETAMINE SULFATE AND AMPHETAMINE SULFATE 2.5; 2.5; 2.5; 2.5 MG/1; MG/1; MG/1; MG/1
10 TABLET ORAL DAILY
Qty: 30 TABLET | Refills: 0 | Status: SHIPPED | OUTPATIENT
Start: 2022-08-16 | End: 2022-08-18

## 2022-08-16 NOTE — TELEPHONE ENCOUNTER
reviewed - last fill 7/18 - ok for refill    Zoraida Celaya MD  [facility]

## 2022-08-17 NOTE — TELEPHONE ENCOUNTER
[facility] called. There is a shortage/back order for the amphetamine-dextroamphetamine (ADDERALL) 10 MG tablet. Pharmacist confirmed this is the case for all pharmacies at this time.     Pharmacist recommends sending over a 20 MG tab prescription with directions to cut tab in half to make 10MG.     Please advise.

## 2022-08-18 ENCOUNTER — OFFICE VISIT (OUTPATIENT)
Dept: FAMILY MEDICINE | Facility: CLINIC | Age: 37
End: 2022-08-18
Payer: COMMERCIAL

## 2022-08-18 VITALS
RESPIRATION RATE: 16 BRPM | SYSTOLIC BLOOD PRESSURE: 108 MMHG | HEIGHT: 73 IN | WEIGHT: 183 LBS | DIASTOLIC BLOOD PRESSURE: 82 MMHG | HEART RATE: 60 BPM | TEMPERATURE: 97.7 F | BODY MASS INDEX: 24.25 KG/M2 | OXYGEN SATURATION: 98 %

## 2022-08-18 DIAGNOSIS — F90.9 ATTENTION DEFICIT HYPERACTIVITY DISORDER (ADHD), UNSPECIFIED ADHD TYPE: ICD-10-CM

## 2022-08-18 DIAGNOSIS — M54.50 CHRONIC BILATERAL LOW BACK PAIN WITHOUT SCIATICA: ICD-10-CM

## 2022-08-18 DIAGNOSIS — A69.20 LYME DISEASE: ICD-10-CM

## 2022-08-18 DIAGNOSIS — F41.9 ANXIETY: ICD-10-CM

## 2022-08-18 DIAGNOSIS — Z11.59 NEED FOR HEPATITIS C SCREENING TEST: Primary | ICD-10-CM

## 2022-08-18 DIAGNOSIS — G89.29 CHRONIC BILATERAL LOW BACK PAIN WITHOUT SCIATICA: ICD-10-CM

## 2022-08-18 DIAGNOSIS — Z00.00 ANNUAL PHYSICAL EXAM: ICD-10-CM

## 2022-08-18 DIAGNOSIS — F32.0 CURRENT MILD EPISODE OF MAJOR DEPRESSIVE DISORDER WITHOUT PRIOR EPISODE (H): ICD-10-CM

## 2022-08-18 PROBLEM — E78.01 FAMILIAL HYPERCHOLESTEROLEMIA: Status: RESOLVED | Noted: 2018-04-27 | Resolved: 2022-08-18

## 2022-08-18 LAB
ALBUMIN SERPL BCG-MCNC: 4.8 G/DL (ref 3.5–5.2)
ALP SERPL-CCNC: 50 U/L (ref 40–129)
ALT SERPL W P-5'-P-CCNC: 24 U/L (ref 10–50)
ANION GAP SERPL CALCULATED.3IONS-SCNC: 12 MMOL/L (ref 7–15)
AST SERPL W P-5'-P-CCNC: 33 U/L (ref 10–50)
BILIRUB SERPL-MCNC: 0.3 MG/DL
BUN SERPL-MCNC: 14.9 MG/DL (ref 6–20)
CALCIUM SERPL-MCNC: 9.7 MG/DL (ref 8.6–10)
CHLORIDE SERPL-SCNC: 100 MMOL/L (ref 98–107)
CHOLEST SERPL-MCNC: 264 MG/DL
CREAT SERPL-MCNC: 0.96 MG/DL (ref 0.67–1.17)
DEPRECATED HCO3 PLAS-SCNC: 25 MMOL/L (ref 22–29)
ERYTHROCYTE [DISTWIDTH] IN BLOOD BY AUTOMATED COUNT: 12.2 % (ref 10–15)
GFR SERPL CREATININE-BSD FRML MDRD: >90 ML/MIN/1.73M2
GLUCOSE SERPL-MCNC: 86 MG/DL (ref 70–99)
HCT VFR BLD AUTO: 42.7 % (ref 40–53)
HDLC SERPL-MCNC: 79 MG/DL
HGB BLD-MCNC: 15 G/DL (ref 13.3–17.7)
LDLC SERPL CALC-MCNC: 169 MG/DL
MCH RBC QN AUTO: 32 PG (ref 26.5–33)
MCHC RBC AUTO-ENTMCNC: 35.1 G/DL (ref 31.5–36.5)
MCV RBC AUTO: 91 FL (ref 78–100)
NONHDLC SERPL-MCNC: 185 MG/DL
PLATELET # BLD AUTO: 180 10E3/UL (ref 150–450)
POTASSIUM SERPL-SCNC: 4.5 MMOL/L (ref 3.4–5.3)
PROT SERPL-MCNC: 7.4 G/DL (ref 6.4–8.3)
RBC # BLD AUTO: 4.69 10E6/UL (ref 4.4–5.9)
SODIUM SERPL-SCNC: 137 MMOL/L (ref 136–145)
TRIGL SERPL-MCNC: 80 MG/DL
WBC # BLD AUTO: 4.8 10E3/UL (ref 4–11)

## 2022-08-18 PROCEDURE — 80061 LIPID PANEL: CPT | Performed by: STUDENT IN AN ORGANIZED HEALTH CARE EDUCATION/TRAINING PROGRAM

## 2022-08-18 PROCEDURE — 36415 COLL VENOUS BLD VENIPUNCTURE: CPT | Performed by: STUDENT IN AN ORGANIZED HEALTH CARE EDUCATION/TRAINING PROGRAM

## 2022-08-18 PROCEDURE — 80053 COMPREHEN METABOLIC PANEL: CPT | Performed by: STUDENT IN AN ORGANIZED HEALTH CARE EDUCATION/TRAINING PROGRAM

## 2022-08-18 PROCEDURE — 85027 COMPLETE CBC AUTOMATED: CPT | Performed by: STUDENT IN AN ORGANIZED HEALTH CARE EDUCATION/TRAINING PROGRAM

## 2022-08-18 PROCEDURE — 99214 OFFICE O/P EST MOD 30 MIN: CPT | Mod: 25 | Performed by: STUDENT IN AN ORGANIZED HEALTH CARE EDUCATION/TRAINING PROGRAM

## 2022-08-18 PROCEDURE — 86618 LYME DISEASE ANTIBODY: CPT | Performed by: STUDENT IN AN ORGANIZED HEALTH CARE EDUCATION/TRAINING PROGRAM

## 2022-08-18 PROCEDURE — 86803 HEPATITIS C AB TEST: CPT | Performed by: STUDENT IN AN ORGANIZED HEALTH CARE EDUCATION/TRAINING PROGRAM

## 2022-08-18 PROCEDURE — 99395 PREV VISIT EST AGE 18-39: CPT | Performed by: STUDENT IN AN ORGANIZED HEALTH CARE EDUCATION/TRAINING PROGRAM

## 2022-08-18 RX ORDER — DEXTROAMPHETAMINE SACCHARATE, AMPHETAMINE ASPARTATE, DEXTROAMPHETAMINE SULFATE AND AMPHETAMINE SULFATE 5; 5; 5; 5 MG/1; MG/1; MG/1; MG/1
10 TABLET ORAL DAILY
Qty: 15 TABLET | Refills: 0 | Status: SHIPPED | OUTPATIENT
Start: 2022-08-18 | End: 2022-09-16

## 2022-08-18 RX ORDER — BUPROPION HYDROCHLORIDE 150 MG/1
150 TABLET ORAL DAILY
Qty: 90 TABLET | Refills: 3 | Status: SHIPPED | OUTPATIENT
Start: 2022-08-18 | End: 2023-06-15

## 2022-08-18 RX ORDER — DEXTROAMPHETAMINE SACCHARATE, AMPHETAMINE ASPARTATE MONOHYDRATE, DEXTROAMPHETAMINE SULFATE AND AMPHETAMINE SULFATE 3.75; 3.75; 3.75; 3.75 MG/1; MG/1; MG/1; MG/1
15 CAPSULE, EXTENDED RELEASE ORAL DAILY
Qty: 30 CAPSULE | Refills: 0 | Status: SHIPPED | OUTPATIENT
Start: 2022-08-18 | End: 2022-09-16

## 2022-08-18 ASSESSMENT — PAIN SCALES - GENERAL: PAINLEVEL: NO PAIN (0)

## 2022-08-18 ASSESSMENT — PATIENT HEALTH QUESTIONNAIRE - PHQ9: SUM OF ALL RESPONSES TO PHQ QUESTIONS 1-9: 1

## 2022-08-18 NOTE — TELEPHONE ENCOUNTER
Requested Medication:   Pending Prescriptions:                       Disp   Refills    amphetamine-dextroamphetamine (ADDERALL X*30 cap*0            Sig: Take 1 capsule (15 mg) by mouth daily       Last Refill:  7/18/22    Last Office Visit:  8/18/2022     Next Appointment with Provider:  Visit date not found     Clinic visit frequency required: Q 6  months    Controlled substance agreement on file: Yes:  Date 8/18/2021.    Urine Drug Screen on file:  No

## 2022-08-18 NOTE — PROGRESS NOTES
Assessment/ Plan     37-year-old male with past medical history of chronic back pain, major depressive disorder, ADHD presents for establishment of care and annual physical exam.    1. Need for hepatitis C screening test  - Hepatitis C Screen Reflex to HCV RNA Quant and Genotype; Future    2. Current mild episode of major depressive disorder without prior episode (H)  3. Anxiety  Would like to go back to wellbutrin. Has tried previous selective serotonin reuptake inhibitor's but stopped due to side effects.  Mild depression and anhedonia symptoms.   - buPROPion (WELLBUTRIN XL) 150 MG 24 hr tablet; Take 1 tablet (150 mg) by mouth daily  Dispense: 90 tablet; Refill: 3    4. Chronic bilateral low back pain without sciatica  L4-L5 disc issues. Has chronic back pain. Will have muscles spasms and pain.  Used to triple jump in college and possibly trigger from this. Flared last in 2018.  Controlling with exercise. No medication. Has done corticosteroid injection with [facility]. No neurologic symptoms now.    EXAM: XR LUMBAR SPINE FLEX AND EXT 2 OR 3 VWS  LOCATION: [facility]  DATE/TIME: 6/28/2021 1:25 PM     INDICATION: Low back pain  COMPARISON: MRI lumbar spine 06/01/2021  TECHNIQUE: CR Lumbar Spine.     IMPRESSION:  Nomenclature assumes 5 lumbar type vertebral bodies. No evidence of dynamic instability between flexion and extension. No acute fracture within the visualized portions.    5. Attention deficit hyperactivity disorder (ADHD), unspecified ADHD type  Diagnosed in 2010. Difficulty with focusing at work.  Lots of racing thoughts.  Had an assessment through [facility] in [place]. Has been on adderral. no side effects with adderral. Good dose with 15 mg XR every day and 10 mg short acting as needed. Has kaushal on higher doses but felt more anxiety and crash on these more.  - amphetamine-dextroamphetamine (ADDERALL) 20 MG tablet; Take 0.5 tablets (10 mg) by mouth daily for 30  days  Dispense: 15 tablet; Refill: 0    6. Annual physical exam  - CBC with platelets; Future  - Comprehensive metabolic panel (BMP + Alb, Alk Phos, ALT, AST, Total. Bili, TP); Future  - Lipid panel reflex to direct LDL Non-fasting; Future    7. Lyme disease  Patient is an avid michael.  Has had approximately 11 tick bites this year.  Would like to be checked for Lyme which is reasonable.  No symptoms lately  - Lyme Disease Total Abs Bld with Reflex to Confirm CLIA; Future    Follow-up in: 6 months for ADHD    Anuel Kan MD    Subjective:     Gardy Koo is a 37 year old female who presents for an annual exam.     Chief Complaint   Patient presents with     Physical     10 mg adderall out of stock at pharmacy. Pharmacy suggested 20mg. Wondering if there is another dosage that can be sent over. Discuss about getting back on 150 of Wellbutrin.      Answers for HPI/ROS submitted by the patient on 8/11/2022  Frequency of exercise:: 6-7 days/week  Getting at least 3 servings of Calcium per day:: Yes  Diet:: Regular (no restrictions)  Taking medications regularly:: No  Medication side effects:: None  Bi-annual eye exam:: Yes  Dental care twice a year:: Yes  Sleep apnea or symptoms of sleep apnea:: None  abdominal pain: No  Blood in stool: No  Blood in urine: No  chest pain: No  chills: No  congestion: No  constipation: No  cough: No  diarrhea: No  dizziness: No  ear pain: No  eye pain: No  nervous/anxious: No  fever: No  frequency: No  genital sores: No  headaches: No  hearing loss: No  heartburn: No  arthralgias: No  joint swelling: No  peripheral edema: No  mood changes: No  myalgias: No  nausea: No  dysuria: No  palpitations: No  Skin sensation changes: No  sore throat: No  urgency: No  rash: No  shortness of breath: No  visual disturbance: No  weakness: No  impotence: No  penile discharge: No  Additional concerns today:: No  Duration of exercise:: 30-45 minutes  Barriers to taking medications::  None    Immunization History   Administered Date(s) Administered     COVID-19,PF,Pfizer (12+ Yrs) 11/10/2021, 12/01/2021     Flu, Unspecified 10/26/2017     Influenza Vaccine, 6+MO IM (QUADRIVALENT W/PRESERVATIVES) 10/26/2017, 11/20/2018, 09/06/2019     Tdap (Adacel,Boostrix) 10/26/2017     Immunization status: due today.- refused COVID booster     Current Outpatient Medications   Medication Sig Dispense Refill     amphetamine-dextroamphetamine (ADDERALL XR) 15 MG 24 hr capsule Take 1 capsule (15 mg) by mouth daily 30 capsule 0     amphetamine-dextroamphetamine (ADDERALL) 10 MG tablet Take 1 tablet (10 mg) by mouth daily 30 tablet 0     amphetamine-dextroamphetamine (ADDERALL XR) 15 MG 24 hr capsule Take 1 capsule (15 mg) by mouth daily 30 capsule 0     amphetamine-dextroamphetamine (ADDERALL) 10 MG tablet Take 1 tablet (10 mg) by mouth daily 30 tablet 0     buPROPion (WELLBUTRIN XL) 150 MG 24 hr tablet Take 1 tablet (150 mg) by mouth daily (Patient not taking: Reported on 8/18/2022) 90 tablet 3     cyclobenzaprine (FLEXERIL) 10 MG tablet [CYCLOBENZAPRINE (FLEXERIL) 10 MG TABLET] Take 1 tablet (10 mg total) by mouth 3 (three) times a day as needed for muscle spasms. (Patient not taking: Reported on 8/18/2022) 30 tablet 1     Past Medical History:   Diagnosis Date     ADHD (attention deficit hyperactivity disorder) 9/23/2016     Anxiety 9/23/2016     Past Surgical History:   Procedure Laterality Date     CYST REMOVAL Left     behind left eye     REPAIR TENDON BICEPS DISTAL UPPER EXTREMITY Right      Morphine and Penicillins  Family History   Problem Relation Age of Onset     Hyperlipidemia Mother      Hyperlipidemia Father      Cancer No family hx of      Heart Disease No family hx of      Social History     Socioeconomic History     Marital status:      Spouse name: Not on file     Number of children: Not on file     Years of education: Not on file     Highest education level: Not on file   Occupational  "History     Not on file   Tobacco Use     Smoking status: Never Smoker     Smokeless tobacco: Never Used   Substance and Sexual Activity     Alcohol use: Yes     Comment: Alcoholic Drinks/day: social 2-3 times a week     Drug use: No     Sexual activity: Yes     Partners: Female   Other Topics Concern     Not on file   Social History Narrative     Not on file     Social Determinants of Health     Financial Resource Strain: Not on file   Food Insecurity: Not on file   Transportation Needs: Not on file   Physical Activity: Not on file   Stress: Not on file   Social Connections: Not on file   Intimate Partner Violence: Not on file   Housing Stability: Not on file       Review of Systems  Complete ROS negative except as noted in the HPI    Objective:      Vitals:    08/18/22 1053   BP: 108/82   BP Location: Left arm   Patient Position: Sitting   Cuff Size: Adult Regular   Pulse: 60   Resp: 16   Temp: 97.7  F (36.5  C)   TempSrc: Temporal   SpO2: 98%   Weight: 83 kg (183 lb)   Height: 1.848 m (6' 0.75\")       General appearance: Alert, cooperative, no distress, appears stated age  Head: Normocephalic, atraumatic, without obvious abnormality  EARS: TM's gray dull with structures seen bilaterally  Eyes: Pupils equal round, reactive.  Conjunctiva clear.  Nose: Nares normal, no drainage.  Throat: Lips, mucosa, tongue normal mucosa pink and moist  Neck: Supple, symmetric, trachea midline, no adenopathy.  No thyroid enlargement, tenderness or nodules.    Lungs: Clear to auscultation bilaterally, no wheezing or crackles present.  Respirations unlabored  Heart: Regular rate and rhythm, normal S1 and S2, no murmur, rub or gallop.  Abdomen: Soft, nontender, nondistended.  Bowel sounds active in all 4 quadrants.  No masses or organomegaly.  Extremities: Extremities normal, atraumatic.  No cyanosis or edema.  Skin: Skin color, texture, turgor normal no rashes or lesions on limited skin exam  Neurologic: CN II through XII intact, " normal strength.      Anuel Wang MD

## 2022-08-19 LAB
B BURGDOR IGG+IGM SER QL: 0.06
HCV AB SERPL QL IA: NONREACTIVE

## 2022-08-19 NOTE — RESULT ENCOUNTER NOTE
Grady,  Your results from your recent clinic visit show:  Your Hep c screen is normal or negative  Your lyme test was normal  Your CMP was normal with normal electrolytes, kidney function, and liver function  Your lipids look ok and I used these as well as other factors from your history to calculate your 10 year risk of having something like a heart attack and it was low risk. Just continue to work on exercise and diet to maintain this low risk.  Your CBC is normal with no anemia or signs of infection seen    If you have more questions please call the clinic at [phone] or send me a ePatientFinder message    Dr. Anuel aKn

## 2022-08-22 ENCOUNTER — MYC MEDICAL ADVICE (OUTPATIENT)
Dept: FAMILY MEDICINE | Facility: CLINIC | Age: 37
End: 2022-08-22

## 2022-08-22 DIAGNOSIS — H61.21 IMPACTED CERUMEN OF RIGHT EAR: Primary | ICD-10-CM

## 2022-08-22 NOTE — TELEPHONE ENCOUNTER
Routing to Dr. Kan to review/advise.    Should patient schedule OV here at [facility] to get ear lavage completed or have a referral to ENT?    Thank you!

## 2022-09-16 DIAGNOSIS — F90.9 ATTENTION DEFICIT HYPERACTIVITY DISORDER (ADHD), UNSPECIFIED ADHD TYPE: Primary | ICD-10-CM

## 2022-09-16 RX ORDER — DEXTROAMPHETAMINE SACCHARATE, AMPHETAMINE ASPARTATE MONOHYDRATE, DEXTROAMPHETAMINE SULFATE AND AMPHETAMINE SULFATE 5; 5; 5; 5 MG/1; MG/1; MG/1; MG/1
20 CAPSULE, EXTENDED RELEASE ORAL DAILY
COMMUNITY
End: 2022-09-16

## 2022-09-16 RX ORDER — DEXTROAMPHETAMINE SACCHARATE, AMPHETAMINE ASPARTATE MONOHYDRATE, DEXTROAMPHETAMINE SULFATE AND AMPHETAMINE SULFATE 5; 5; 5; 5 MG/1; MG/1; MG/1; MG/1
20 CAPSULE, EXTENDED RELEASE ORAL DAILY
Qty: 15 CAPSULE | Refills: 0 | Status: SHIPPED | OUTPATIENT
Start: 2022-09-16 | End: 2023-02-23

## 2022-09-16 NOTE — TELEPHONE ENCOUNTER
Pharmacy out of 15mg and 10mg of Adderral.     Patient called and said ok to fill 15 tablets of 20mg and he will  his rx when the med is in stock again.

## 2022-09-17 ENCOUNTER — MYC MEDICAL ADVICE (OUTPATIENT)
Dept: FAMILY MEDICINE | Facility: CLINIC | Age: 37
End: 2022-09-17

## 2022-09-17 DIAGNOSIS — F90.9 ATTENTION DEFICIT HYPERACTIVITY DISORDER (ADHD), UNSPECIFIED ADHD TYPE: Primary | ICD-10-CM

## 2022-09-19 RX ORDER — DEXTROAMPHETAMINE SACCHARATE, AMPHETAMINE ASPARTATE, DEXTROAMPHETAMINE SULFATE AND AMPHETAMINE SULFATE 5; 5; 5; 5 MG/1; MG/1; MG/1; MG/1
10 TABLET ORAL DAILY
Qty: 15 TABLET | Refills: 0 | Status: SHIPPED | OUTPATIENT
Start: 2022-09-19 | End: 2022-10-17

## 2022-09-25 ENCOUNTER — HEALTH MAINTENANCE LETTER (OUTPATIENT)
Age: 37
End: 2022-09-25

## 2022-10-17 ENCOUNTER — MYC REFILL (OUTPATIENT)
Dept: FAMILY MEDICINE | Facility: CLINIC | Age: 37
End: 2022-10-17

## 2022-10-17 DIAGNOSIS — F90.9 ATTENTION DEFICIT HYPERACTIVITY DISORDER (ADHD), UNSPECIFIED ADHD TYPE: ICD-10-CM

## 2022-10-17 RX ORDER — DEXTROAMPHETAMINE SACCHARATE, AMPHETAMINE ASPARTATE MONOHYDRATE, DEXTROAMPHETAMINE SULFATE AND AMPHETAMINE SULFATE 3.75; 3.75; 3.75; 3.75 MG/1; MG/1; MG/1; MG/1
15 CAPSULE, EXTENDED RELEASE ORAL DAILY
Qty: 30 CAPSULE | Refills: 0 | Status: SHIPPED | OUTPATIENT
Start: 2022-10-17 | End: 2022-11-16

## 2022-10-17 RX ORDER — DEXTROAMPHETAMINE SACCHARATE, AMPHETAMINE ASPARTATE, DEXTROAMPHETAMINE SULFATE AND AMPHETAMINE SULFATE 5; 5; 5; 5 MG/1; MG/1; MG/1; MG/1
10 TABLET ORAL DAILY
Qty: 15 TABLET | Refills: 0 | Status: SHIPPED | OUTPATIENT
Start: 2022-10-17 | End: 2022-11-16

## 2022-10-19 ENCOUNTER — OFFICE VISIT (OUTPATIENT)
Dept: OTOLARYNGOLOGY | Facility: CLINIC | Age: 37
End: 2022-10-19
Attending: STUDENT IN AN ORGANIZED HEALTH CARE EDUCATION/TRAINING PROGRAM
Payer: COMMERCIAL

## 2022-10-19 DIAGNOSIS — H61.21 IMPACTED CERUMEN OF RIGHT EAR: ICD-10-CM

## 2022-10-19 PROCEDURE — 69210 REMOVE IMPACTED EAR WAX UNI: CPT | Performed by: OTOLARYNGOLOGY

## 2022-10-19 NOTE — LETTER
10/19/2022         RE: Grady Koo  [address] [place] [id number]        Dear Colleague,    Thank you for referring your patient, Grady Koo, to the [facility]. Please see a copy of my visit note below.    CHIEF COMPLAINT: Patient presents with:  Cerumen Impaction         HISTORY OF PRESENT ILLNESS    Grady was seen at the behest of Anuel Wang for cerumen impaction(s)  No other ENT related concerns.  No tinnitus or dizziness. No history of otologic surgery.            REVIEW OF SYSTEMS    Review of Systems as per HPI and PMHx, otherwise 10 system review system are negative.       ALLERGIES    Morphine and Penicillins    CURRENT MEDICATIONS      Current Outpatient Medications:      amphetamine-dextroamphetamine (ADDERALL XR) 15 MG 24 hr capsule, Take 1 capsule (15 mg) by mouth daily, Disp: 30 capsule, Rfl: 0     amphetamine-dextroamphetamine (ADDERALL XR) 20 MG 24 hr capsule, Take 1 capsule (20 mg) by mouth daily, Disp: 15 capsule, Rfl: 0     amphetamine-dextroamphetamine (ADDERALL) 10 MG tablet, Take 1 tablet (10 mg) by mouth daily, Disp: 30 tablet, Rfl: 0     amphetamine-dextroamphetamine (ADDERALL) 20 MG tablet, Take 0.5 tablets (10 mg) by mouth daily, Disp: 15 tablet, Rfl: 0     buPROPion (WELLBUTRIN XL) 150 MG 24 hr tablet, Take 1 tablet (150 mg) by mouth daily, Disp: 90 tablet, Rfl: 3     PAST MEDICAL HISTORY    PAST MEDICAL HISTORY:   Past Medical History:   Diagnosis Date     ADHD (attention deficit hyperactivity disorder) 9/23/2016     Anxiety 9/23/2016       PAST SURGICAL HISTORY    PAST SURGICAL HISTORY:   Past Surgical History:   Procedure Laterality Date     CYST REMOVAL Left     behind left eye     REPAIR TENDON BICEPS DISTAL UPPER EXTREMITY Right        FAMILY  HISTORY    FAMILY HISTORY:   Family History   Problem Relation Age of Onset     Hyperlipidemia Mother      Hyperlipidemia Father      Cancer No family hx of      Heart Disease No family hx of         SOCIAL HISTORY    SOCIAL HISTORY:   Social History     Tobacco Use     Smoking status: Never     Smokeless tobacco: Never   Substance Use Topics     Alcohol use: Yes     Comment: Alcoholic Drinks/day: social 2-3 times a week, 3-6        PHYSICAL EXAM    HEAD: Normal appearance and symmetry:  No cutaneous lesions.      NECK:  supple     EARS: normal TM, EACs;  Cerumen impaction RIGHT ear    CERUMEN IMPACTION REMOVAL    After obtaining verbal consent, and using the binocular microscope.  Cerumen impaction(s) were removed from the affected ear canal(s)  using a wire loops and/or suction.  The patient tolerated the procedure well without incident.      EYES:  EOMI    CN VII/XII:  intact     NOSE:     Dorsum:   straight  Septum:  midline  Mucosa:  moist        ORAL CAVITY/OROPHARYNX:     Lips:  Normal.  Tongue: normal, midline  Mucosa:   no lesions     NECK:  Trachea:  midline.              Thyroid:  normal              Adenopathy:  none        NEURO:   Alert and Oriented     GAIT AND STATION:  normal     RESPIRATORY:   Symmetry and Respiratory effort     PSYCH:  Normal mood and affect     SKIN:   warm and dry         IMPRESSION:    Encounter Diagnosis   Name Primary?     Impacted cerumen of right ear           RECOMMENDATIONS:      Orders Placed This Encounter   Procedures     Remove Cerumen      Return 6 months with audiogram (basline) with ear cleaning.         Again, thank you for allowing me to participate in the care of your patient.        Sincerely,        Melvin Low MD

## 2022-10-20 NOTE — PROGRESS NOTES
CHIEF COMPLAINT: Patient presents with:  Cerumen Impaction         HISTORY OF PRESENT ILLNESS    Grady was seen at the behest of Anuel Wang for cerumen impaction(s)  No other ENT related concerns.  No tinnitus or dizziness. No history of otologic surgery.            REVIEW OF SYSTEMS    Review of Systems as per HPI and PMHx, otherwise 10 system review system are negative.       ALLERGIES    Morphine and Penicillins    CURRENT MEDICATIONS      Current Outpatient Medications:      amphetamine-dextroamphetamine (ADDERALL XR) 15 MG 24 hr capsule, Take 1 capsule (15 mg) by mouth daily, Disp: 30 capsule, Rfl: 0     amphetamine-dextroamphetamine (ADDERALL XR) 20 MG 24 hr capsule, Take 1 capsule (20 mg) by mouth daily, Disp: 15 capsule, Rfl: 0     amphetamine-dextroamphetamine (ADDERALL) 10 MG tablet, Take 1 tablet (10 mg) by mouth daily, Disp: 30 tablet, Rfl: 0     amphetamine-dextroamphetamine (ADDERALL) 20 MG tablet, Take 0.5 tablets (10 mg) by mouth daily, Disp: 15 tablet, Rfl: 0     buPROPion (WELLBUTRIN XL) 150 MG 24 hr tablet, Take 1 tablet (150 mg) by mouth daily, Disp: 90 tablet, Rfl: 3     PAST MEDICAL HISTORY    PAST MEDICAL HISTORY:   Past Medical History:   Diagnosis Date     ADHD (attention deficit hyperactivity disorder) 9/23/2016     Anxiety 9/23/2016       PAST SURGICAL HISTORY    PAST SURGICAL HISTORY:   Past Surgical History:   Procedure Laterality Date     CYST REMOVAL Left     behind left eye     REPAIR TENDON BICEPS DISTAL UPPER EXTREMITY Right        FAMILY  HISTORY    FAMILY HISTORY:   Family History   Problem Relation Age of Onset     Hyperlipidemia Mother      Hyperlipidemia Father      Cancer No family hx of      Heart Disease No family hx of        SOCIAL HISTORY    SOCIAL HISTORY:   Social History     Tobacco Use     Smoking status: Never     Smokeless tobacco: Never   Substance Use Topics     Alcohol use: Yes     Comment: Alcoholic Drinks/day: social 2-3 times a week, 3-6        PHYSICAL  EXAM    HEAD: Normal appearance and symmetry:  No cutaneous lesions.      NECK:  supple     EARS: normal TM, EACs;  Cerumen impaction RIGHT ear    CERUMEN IMPACTION REMOVAL    After obtaining verbal consent, and using the binocular microscope.  Cerumen impaction(s) were removed from the affected ear canal(s)  using a wire loops and/or suction.  The patient tolerated the procedure well without incident.      EYES:  EOMI    CN VII/XII:  intact     NOSE:     Dorsum:   straight  Septum:  midline  Mucosa:  moist        ORAL CAVITY/OROPHARYNX:     Lips:  Normal.  Tongue: normal, midline  Mucosa:   no lesions     NECK:  Trachea:  midline.              Thyroid:  normal              Adenopathy:  none        NEURO:   Alert and Oriented     GAIT AND STATION:  normal     RESPIRATORY:   Symmetry and Respiratory effort     PSYCH:  Normal mood and affect     SKIN:   warm and dry         IMPRESSION:    Encounter Diagnosis   Name Primary?     Impacted cerumen of right ear           RECOMMENDATIONS:      Orders Placed This Encounter   Procedures     Remove Cerumen      Return 6 months with audiogram (basline) with ear cleaning.

## 2022-11-16 ENCOUNTER — MYC REFILL (OUTPATIENT)
Dept: FAMILY MEDICINE | Facility: CLINIC | Age: 37
End: 2022-11-16

## 2022-11-16 DIAGNOSIS — F90.9 ATTENTION DEFICIT HYPERACTIVITY DISORDER (ADHD), UNSPECIFIED ADHD TYPE: ICD-10-CM

## 2022-11-18 RX ORDER — DEXTROAMPHETAMINE SACCHARATE, AMPHETAMINE ASPARTATE MONOHYDRATE, DEXTROAMPHETAMINE SULFATE AND AMPHETAMINE SULFATE 3.75; 3.75; 3.75; 3.75 MG/1; MG/1; MG/1; MG/1
15 CAPSULE, EXTENDED RELEASE ORAL DAILY
Qty: 30 CAPSULE | Refills: 0 | Status: SHIPPED | OUTPATIENT
Start: 2022-11-18 | End: 2022-12-16

## 2022-11-18 RX ORDER — DEXTROAMPHETAMINE SACCHARATE, AMPHETAMINE ASPARTATE, DEXTROAMPHETAMINE SULFATE AND AMPHETAMINE SULFATE 5; 5; 5; 5 MG/1; MG/1; MG/1; MG/1
10 TABLET ORAL DAILY
Qty: 15 TABLET | Refills: 0 | Status: SHIPPED | OUTPATIENT
Start: 2022-11-18 | End: 2022-12-16

## 2022-12-16 ENCOUNTER — MYC REFILL (OUTPATIENT)
Dept: FAMILY MEDICINE | Facility: CLINIC | Age: 37
End: 2022-12-16

## 2022-12-16 DIAGNOSIS — F90.9 ATTENTION DEFICIT HYPERACTIVITY DISORDER (ADHD), UNSPECIFIED ADHD TYPE: ICD-10-CM

## 2022-12-16 RX ORDER — DEXTROAMPHETAMINE SACCHARATE, AMPHETAMINE ASPARTATE, DEXTROAMPHETAMINE SULFATE AND AMPHETAMINE SULFATE 5; 5; 5; 5 MG/1; MG/1; MG/1; MG/1
10 TABLET ORAL DAILY
Qty: 15 TABLET | Refills: 0 | Status: SHIPPED | OUTPATIENT
Start: 2022-12-16 | End: 2023-01-16

## 2022-12-16 RX ORDER — DEXTROAMPHETAMINE SACCHARATE, AMPHETAMINE ASPARTATE MONOHYDRATE, DEXTROAMPHETAMINE SULFATE AND AMPHETAMINE SULFATE 3.75; 3.75; 3.75; 3.75 MG/1; MG/1; MG/1; MG/1
15 CAPSULE, EXTENDED RELEASE ORAL DAILY
Qty: 30 CAPSULE | Refills: 0 | Status: SHIPPED | OUTPATIENT
Start: 2022-12-16 | End: 2023-01-16

## 2022-12-16 NOTE — TELEPHONE ENCOUNTER
Last clinic visit: 8/18/2022    Clinic visit frequency required: Q 6  months    Next clinic visit: N/A    Non Opioid Controlled substance agreement on file: Yes:  Date 8/18/2021.    Urine Drug Screen on file:  No     Pending Prescriptions:                       Disp   Refills    amphetamine-dextroamphetamine (ADDERALL X*30 cap*0            Sig: Take 1 capsule (15 mg) by mouth daily    amphetamine-dextroamphetamine (ADDERALL) *15 tab*0            Sig: Take 0.5 tablets (10 mg) by mouth daily

## 2023-02-15 ENCOUNTER — MYC REFILL (OUTPATIENT)
Dept: FAMILY MEDICINE | Facility: CLINIC | Age: 38
End: 2023-02-15
Payer: COMMERCIAL

## 2023-02-15 DIAGNOSIS — F90.9 ATTENTION DEFICIT HYPERACTIVITY DISORDER (ADHD), UNSPECIFIED ADHD TYPE: ICD-10-CM

## 2023-02-16 RX ORDER — DEXTROAMPHETAMINE SACCHARATE, AMPHETAMINE ASPARTATE, DEXTROAMPHETAMINE SULFATE AND AMPHETAMINE SULFATE 5; 5; 5; 5 MG/1; MG/1; MG/1; MG/1
10 TABLET ORAL DAILY
Qty: 15 TABLET | Refills: 0 | Status: SHIPPED | OUTPATIENT
Start: 2023-02-16 | End: 2023-03-13

## 2023-02-16 RX ORDER — DEXTROAMPHETAMINE SACCHARATE, AMPHETAMINE ASPARTATE MONOHYDRATE, DEXTROAMPHETAMINE SULFATE AND AMPHETAMINE SULFATE 3.75; 3.75; 3.75; 3.75 MG/1; MG/1; MG/1; MG/1
15 CAPSULE, EXTENDED RELEASE ORAL DAILY
Qty: 30 CAPSULE | Refills: 0 | Status: SHIPPED | OUTPATIENT
Start: 2023-02-16 | End: 2023-03-13

## 2023-02-16 NOTE — TELEPHONE ENCOUNTER
Medication last filled:     Last clinic visit: 8/18/2022    Clinic visit frequency required: Q 6  months    Next clinic visit: 02/23/2023    Controlled substance agreement on file: Yes:  Date 8/18/2021.    Urine Drug Screen on file:  No

## 2023-02-23 ENCOUNTER — OFFICE VISIT (OUTPATIENT)
Dept: FAMILY MEDICINE | Facility: CLINIC | Age: 38
End: 2023-02-23
Payer: COMMERCIAL

## 2023-02-23 VITALS
DIASTOLIC BLOOD PRESSURE: 80 MMHG | WEIGHT: 192.19 LBS | HEIGHT: 73 IN | SYSTOLIC BLOOD PRESSURE: 120 MMHG | BODY MASS INDEX: 25.47 KG/M2 | OXYGEN SATURATION: 98 % | TEMPERATURE: 97.8 F | HEART RATE: 71 BPM

## 2023-02-23 DIAGNOSIS — F90.9 ATTENTION DEFICIT HYPERACTIVITY DISORDER (ADHD), UNSPECIFIED ADHD TYPE: Primary | ICD-10-CM

## 2023-02-23 PROBLEM — M54.9 CHRONIC BACK PAIN: Status: RESOLVED | Noted: 2020-10-20 | Resolved: 2023-02-23

## 2023-02-23 PROBLEM — G89.29 CHRONIC BACK PAIN: Status: RESOLVED | Noted: 2020-10-20 | Resolved: 2023-02-23

## 2023-02-23 LAB
AMPHETAMINES UR QL SCN: ABNORMAL
BARBITURATES UR QL SCN: ABNORMAL
BENZODIAZ UR QL SCN: ABNORMAL
BZE UR QL SCN: ABNORMAL
CANNABINOIDS UR QL SCN: ABNORMAL
CREAT UR-MCNC: 16.6 MG/DL
OPIATES UR QL SCN: ABNORMAL
OXYCODONE UR QL: ABNORMAL
PCP QUAL URINE (ROCHE): ABNORMAL

## 2023-02-23 PROCEDURE — 99214 OFFICE O/P EST MOD 30 MIN: CPT | Performed by: STUDENT IN AN ORGANIZED HEALTH CARE EDUCATION/TRAINING PROGRAM

## 2023-02-23 PROCEDURE — 80307 DRUG TEST PRSMV CHEM ANLYZR: CPT | Performed by: STUDENT IN AN ORGANIZED HEALTH CARE EDUCATION/TRAINING PROGRAM

## 2023-02-23 ASSESSMENT — PATIENT HEALTH QUESTIONNAIRE - PHQ9
SUM OF ALL RESPONSES TO PHQ QUESTIONS 1-9: 0
SUM OF ALL RESPONSES TO PHQ QUESTIONS 1-9: 0
10. IF YOU CHECKED OFF ANY PROBLEMS, HOW DIFFICULT HAVE THESE PROBLEMS MADE IT FOR YOU TO DO YOUR WORK, TAKE CARE OF THINGS AT HOME, OR GET ALONG WITH OTHER PEOPLE: NOT DIFFICULT AT ALL

## 2023-02-23 ASSESSMENT — PAIN SCALES - GENERAL: PAINLEVEL: NO PAIN (0)

## 2023-02-23 NOTE — PROGRESS NOTES
Assessment and Plan     38-year-old male who presents for follow-up regarding ADHD.  Patient only due for urine drug screen today and otherwise is doing well on his medication.    1. Attention deficit hyperactivity disorder (ADHD), unspecified ADHD type  - Urine Drugs of Abuse Screen Panel 1 - Drug Screen (Full); Future    Follow up: 6 months ADHD  Options for treatment and follow-up care were reviewed with the patient and/or guardian. Grady Koo and/or guardian engaged in the decision making process and verbalized understanding of the options discussed and agreed with the final plan.    Dr. Anuel Kan         HPI:   Grady Koo is a 38 year old  male who presents for:    Chief Complaint   Patient presents with     Medication Update     ADHD Hx:  Diagnosed in 2010. Difficulty with focusing at work.  Lots of racing thoughts.  Had an assessment through [facility] in [place]. Has been on adderral. no side effects with adderral. Good dose with 15 mg XR every day and 10 mg short acting as needed. Has been on higher doses but felt more anxiety and crash on these more.    Today:  He tells me that things are going well.  He still likes his current dosing of Adderall XR 15 mg daily and 10 mg short acting as needed which he takes almost every day.  Denies any side effects with his medication such as weight loss, high blood pressure, sleep changes.    Wt Readings from Last 2 Encounters:   02/23/23 87.2 kg (192 lb 3 oz)   08/18/22 83 kg (183 lb)     Urine drug screen: today 2/23  CSA 8/22    Answers for HPI/ROS submitted by the patient on 2/23/2023  If you checked off any problems, how difficult have these problems made it for you to do your work, take care of things at home, or get along with other people?: Not difficult at all  PHQ9 TOTAL SCORE: 0  What is the reason for your visit today? : Need to come in every 6 months for medication  How many servings of fruits and vegetables do you eat daily?:  "2-3  On average, how many sweetened beverages do you drink each day (Examples: soda, juice, sweet tea, etc.  Do NOT count diet or artificially sweetened beverages)?: 1  How many minutes a day do you exercise enough to make your heart beat faster?: 30 to 60  How many days a week do you exercise enough to make your heart beat faster?: 5  How many days per week do you miss taking your medication?: 0           PMHX:     Patient Active Problem List   Diagnosis     ADHD (attention deficit hyperactivity disorder)     Chronic back pain     Current mild episode of major depressive disorder without prior episode (H)       Social History     Tobacco Use     Smoking status: Never     Smokeless tobacco: Never   Substance Use Topics     Alcohol use: Yes     Comment: Alcoholic Drinks/day: social 2-3 times a week, 3-6     Drug use: No       Social History     Social History Narrative     Not on file       Allergies   Allergen Reactions     Morphine Nausea and Vomiting     Penicillins Unknown       No results found for this or any previous visit (from the past 24 hour(s)).         Review of Systems:    ROS: 10 point ROS neg other than the symptoms noted above in the HPI.         Physical Exam:     Vitals:    02/23/23 1243   BP: 120/80   Pulse: 71   Temp: 97.8  F (36.6  C)   SpO2: 98%   Weight: 87.2 kg (192 lb 3 oz)   Height: 1.854 m (6' 1\")     Body mass index is 25.36 kg/m .    General appearance: Alert, cooperative, no distress, appears stated age  Head: Normocephalic, atraumatic, without obvious abnormality  Eyes: Pupils equal round, reactive.  Conjunctiva clear.  Nose: Nares normal, no drainage.  Throat: Lips, mucosa, tongue normal mucosa pink and moist  Neck: Supple, symmetric, trachea midline,          "

## 2023-02-24 NOTE — RESULT ENCOUNTER NOTE
Grady,  Your results from your recent clinic visit show:  Your urine drug screen was as expected    If you have more questions please call the clinic at [phone] or send me a [email or web address] message    Dr. Anuel Kan

## 2023-03-13 ENCOUNTER — MYC REFILL (OUTPATIENT)
Dept: FAMILY MEDICINE | Facility: CLINIC | Age: 38
End: 2023-03-13
Payer: COMMERCIAL

## 2023-03-13 DIAGNOSIS — F90.9 ATTENTION DEFICIT HYPERACTIVITY DISORDER (ADHD), UNSPECIFIED ADHD TYPE: ICD-10-CM

## 2023-03-13 RX ORDER — DEXTROAMPHETAMINE SACCHARATE, AMPHETAMINE ASPARTATE MONOHYDRATE, DEXTROAMPHETAMINE SULFATE AND AMPHETAMINE SULFATE 3.75; 3.75; 3.75; 3.75 MG/1; MG/1; MG/1; MG/1
15 CAPSULE, EXTENDED RELEASE ORAL DAILY
Qty: 30 CAPSULE | Refills: 0 | Status: SHIPPED | OUTPATIENT
Start: 2023-03-13 | End: 2023-04-14

## 2023-03-13 RX ORDER — DEXTROAMPHETAMINE SACCHARATE, AMPHETAMINE ASPARTATE, DEXTROAMPHETAMINE SULFATE AND AMPHETAMINE SULFATE 5; 5; 5; 5 MG/1; MG/1; MG/1; MG/1
10 TABLET ORAL DAILY
Qty: 15 TABLET | Refills: 0 | Status: SHIPPED | OUTPATIENT
Start: 2023-03-13 | End: 2023-04-14

## 2023-03-13 NOTE — TELEPHONE ENCOUNTER
Medication last filled:     Last clinic visit: 2/23/2023    Clinic visit frequency required: Q 6  months    Next clinic visit: N/A    Controlled substance agreement on file: Yes:  Date 8/18/2021.    Urine Drug Screen on file:  Yes     Pending Prescriptions:                       Disp   Refills    amphetamine-dextroamphetamine (ADDERALL X*30 cap*0            Sig: Take 1 capsule (15 mg) by mouth daily    amphetamine-dextroamphetamine (ADDERALL) *15 tab*0            Sig: Take 0.5 tablets (10 mg) by mouth daily

## 2023-04-14 ENCOUNTER — MYC REFILL (OUTPATIENT)
Dept: FAMILY MEDICINE | Facility: CLINIC | Age: 38
End: 2023-04-14
Payer: COMMERCIAL

## 2023-04-14 DIAGNOSIS — F90.9 ATTENTION DEFICIT HYPERACTIVITY DISORDER (ADHD), UNSPECIFIED ADHD TYPE: ICD-10-CM

## 2023-04-14 RX ORDER — DEXTROAMPHETAMINE SACCHARATE, AMPHETAMINE ASPARTATE MONOHYDRATE, DEXTROAMPHETAMINE SULFATE AND AMPHETAMINE SULFATE 3.75; 3.75; 3.75; 3.75 MG/1; MG/1; MG/1; MG/1
15 CAPSULE, EXTENDED RELEASE ORAL DAILY
Qty: 30 CAPSULE | Refills: 0 | Status: SHIPPED | OUTPATIENT
Start: 2023-04-14 | End: 2023-05-14

## 2023-04-14 RX ORDER — DEXTROAMPHETAMINE SACCHARATE, AMPHETAMINE ASPARTATE, DEXTROAMPHETAMINE SULFATE AND AMPHETAMINE SULFATE 5; 5; 5; 5 MG/1; MG/1; MG/1; MG/1
10 TABLET ORAL DAILY
Qty: 15 TABLET | Refills: 0 | Status: SHIPPED | OUTPATIENT
Start: 2023-04-14 | End: 2023-05-14

## 2023-04-24 ENCOUNTER — OFFICE VISIT (OUTPATIENT)
Dept: OTOLARYNGOLOGY | Facility: CLINIC | Age: 38
End: 2023-04-24
Payer: COMMERCIAL

## 2023-04-24 ENCOUNTER — OFFICE VISIT (OUTPATIENT)
Dept: AUDIOLOGY | Facility: CLINIC | Age: 38
End: 2023-04-24
Payer: COMMERCIAL

## 2023-04-24 DIAGNOSIS — H90.3 SENSORINEURAL HEARING LOSS (SNHL) OF BOTH EARS: Primary | ICD-10-CM

## 2023-04-24 DIAGNOSIS — Z98.890 HISTORY OF EAR SURGERY: ICD-10-CM

## 2023-04-24 DIAGNOSIS — H61.21 IMPACTED CERUMEN OF RIGHT EAR: Primary | ICD-10-CM

## 2023-04-24 PROCEDURE — 92550 TYMPANOMETRY & REFLEX THRESH: CPT | Performed by: AUDIOLOGIST

## 2023-04-24 PROCEDURE — 92557 COMPREHENSIVE HEARING TEST: CPT | Performed by: AUDIOLOGIST

## 2023-04-24 PROCEDURE — 69210 REMOVE IMPACTED EAR WAX UNI: CPT | Performed by: OTOLARYNGOLOGY

## 2023-04-24 NOTE — LETTER
4/24/2023         RE: Grady Koo  [address] [place] [id number]        Dear Colleague,    Thank you for referring your patient, Grady Koo, to the [facility]. Please see a copy of my visit note below.    CHIEF COMPLAINT:  Patient presents with:  Cerumen Impaction: Hearing test after          HISTORY OF PRESENT ILLNESS    Grady was seen in follow up after previous 10/19/2022 visit for ear cleaning. History of right tympanoplasty at [place].   Here for routine cleaning followed by audiogram        REVIEW OF SYSTEMS    Review of Systems: a 10-system review is reviewed at this encounter.  See scanned document.       Allergies   Allergen Reactions     Morphine Nausea and Vomiting     Penicillins Unknown           PHYSICAL EXAM:        HEAD: Normal appearance and symmetry:  No cutaneous lesions.      EARS:        RIGHT: slight cerumen impaction (removed with loop); TM      partially sclerotic   LEFT:   TM atelectatic with posterior/inferrior bleb     NOSE:    Dorsum:   straight       ORAL CAVITY/OROPHARYNX:    Lips:  Normal.     NECK:  Trachea:  midline     NEURO:   Alert and Oriented    GAIT AND STATION:  normal     RESPIRATORY:   Symmetry and Respiratory effort    PSYCH:   normal mood and affect    SKIN:  warm and dry         IMPRESSION:   Encounter Diagnoses   Name Primary?     Impacted cerumen of right ear Yes     History of ear surgery             RECOMMENDATIONS:    Orders Placed This Encounter   Procedures     Remove Cerumen     Audiogram today  Routine valsavla  Return annually      Again, thank you for allowing me to participate in the care of your patient.        Sincerely,        Melvin Low MD

## 2023-04-24 NOTE — PROGRESS NOTES
AUDIOLOGY REPORT     SUMMARY: Audiology visit completed. See audiogram for results.       RECOMMENDATIONS: Follow-up with ENT.    Coby Alaniz, CCC-A  [place] Licensed Audiologist #[id number]

## 2023-04-24 NOTE — PROGRESS NOTES
CHIEF COMPLAINT:  Patient presents with:  Cerumen Impaction: Hearing test after          HISTORY OF PRESENT ILLNESS    Grady was seen in follow up after previous 10/19/2022 visit for ear cleaning. History of right tympanoplasty at [place].   Here for routine cleaning followed by audiogram        REVIEW OF SYSTEMS    Review of Systems: a 10-system review is reviewed at this encounter.  See scanned document.       Allergies   Allergen Reactions     Morphine Nausea and Vomiting     Penicillins Unknown           PHYSICAL EXAM:        HEAD: Normal appearance and symmetry:  No cutaneous lesions.      EARS:        RIGHT: slight cerumen impaction (removed with loop); TM      partially sclerotic   LEFT:   TM atelectatic with posterior/inferrior bleb     NOSE:    Dorsum:   straight       ORAL CAVITY/OROPHARYNX:    Lips:  Normal.     NECK:  Trachea:  midline     NEURO:   Alert and Oriented    GAIT AND STATION:  normal     RESPIRATORY:   Symmetry and Respiratory effort    PSYCH:   normal mood and affect    SKIN:  warm and dry         IMPRESSION:   Encounter Diagnoses   Name Primary?     Impacted cerumen of right ear Yes     History of ear surgery             RECOMMENDATIONS:    Orders Placed This Encounter   Procedures     Remove Cerumen     Audiogram today  Routine valsavla  Return annually

## 2023-05-14 ENCOUNTER — MYC REFILL (OUTPATIENT)
Dept: FAMILY MEDICINE | Facility: CLINIC | Age: 38
End: 2023-05-14
Payer: COMMERCIAL

## 2023-05-14 DIAGNOSIS — F90.9 ATTENTION DEFICIT HYPERACTIVITY DISORDER (ADHD), UNSPECIFIED ADHD TYPE: ICD-10-CM

## 2023-05-15 ENCOUNTER — MYC REFILL (OUTPATIENT)
Dept: FAMILY MEDICINE | Facility: CLINIC | Age: 38
End: 2023-05-15
Payer: COMMERCIAL

## 2023-05-15 DIAGNOSIS — F90.9 ATTENTION DEFICIT HYPERACTIVITY DISORDER (ADHD), UNSPECIFIED ADHD TYPE: ICD-10-CM

## 2023-05-15 RX ORDER — DEXTROAMPHETAMINE SACCHARATE, AMPHETAMINE ASPARTATE, DEXTROAMPHETAMINE SULFATE AND AMPHETAMINE SULFATE 5; 5; 5; 5 MG/1; MG/1; MG/1; MG/1
10 TABLET ORAL DAILY
Qty: 15 TABLET | Refills: 0 | Status: SHIPPED | OUTPATIENT
Start: 2023-05-15 | End: 2023-06-12

## 2023-05-15 RX ORDER — DEXTROAMPHETAMINE SACCHARATE, AMPHETAMINE ASPARTATE, DEXTROAMPHETAMINE SULFATE AND AMPHETAMINE SULFATE 5; 5; 5; 5 MG/1; MG/1; MG/1; MG/1
10 TABLET ORAL DAILY
Qty: 15 TABLET | Refills: 0 | Status: CANCELLED | OUTPATIENT
Start: 2023-05-15

## 2023-05-15 RX ORDER — DEXTROAMPHETAMINE SACCHARATE, AMPHETAMINE ASPARTATE MONOHYDRATE, DEXTROAMPHETAMINE SULFATE AND AMPHETAMINE SULFATE 3.75; 3.75; 3.75; 3.75 MG/1; MG/1; MG/1; MG/1
15 CAPSULE, EXTENDED RELEASE ORAL DAILY
Qty: 30 CAPSULE | Refills: 0 | Status: CANCELLED | OUTPATIENT
Start: 2023-05-15

## 2023-05-15 RX ORDER — DEXTROAMPHETAMINE SACCHARATE, AMPHETAMINE ASPARTATE MONOHYDRATE, DEXTROAMPHETAMINE SULFATE AND AMPHETAMINE SULFATE 3.75; 3.75; 3.75; 3.75 MG/1; MG/1; MG/1; MG/1
15 CAPSULE, EXTENDED RELEASE ORAL DAILY
Qty: 30 CAPSULE | Refills: 0 | Status: SHIPPED | OUTPATIENT
Start: 2023-05-15 | End: 2023-06-12

## 2023-06-12 ENCOUNTER — MYC REFILL (OUTPATIENT)
Dept: FAMILY MEDICINE | Facility: CLINIC | Age: 38
End: 2023-06-12
Payer: COMMERCIAL

## 2023-06-12 DIAGNOSIS — F90.9 ATTENTION DEFICIT HYPERACTIVITY DISORDER (ADHD), UNSPECIFIED ADHD TYPE: ICD-10-CM

## 2023-06-12 RX ORDER — DEXTROAMPHETAMINE SACCHARATE, AMPHETAMINE ASPARTATE, DEXTROAMPHETAMINE SULFATE AND AMPHETAMINE SULFATE 5; 5; 5; 5 MG/1; MG/1; MG/1; MG/1
10 TABLET ORAL DAILY
Qty: 15 TABLET | Refills: 0 | Status: SHIPPED | OUTPATIENT
Start: 2023-06-12 | End: 2023-07-11

## 2023-06-12 RX ORDER — DEXTROAMPHETAMINE SACCHARATE, AMPHETAMINE ASPARTATE MONOHYDRATE, DEXTROAMPHETAMINE SULFATE AND AMPHETAMINE SULFATE 3.75; 3.75; 3.75; 3.75 MG/1; MG/1; MG/1; MG/1
15 CAPSULE, EXTENDED RELEASE ORAL DAILY
Qty: 30 CAPSULE | Refills: 0 | Status: SHIPPED | OUTPATIENT
Start: 2023-06-12 | End: 2023-07-11

## 2023-06-15 ENCOUNTER — MYC MEDICAL ADVICE (OUTPATIENT)
Dept: FAMILY MEDICINE | Facility: CLINIC | Age: 38
End: 2023-06-15
Payer: COMMERCIAL

## 2023-06-15 DIAGNOSIS — F41.9 ANXIETY: ICD-10-CM

## 2023-06-15 RX ORDER — BUPROPION HYDROCHLORIDE 150 MG/1
300 TABLET ORAL DAILY
Qty: 180 TABLET | Refills: 3 | Status: SHIPPED | OUTPATIENT
Start: 2023-06-15 | End: 2024-07-02

## 2023-08-03 ENCOUNTER — VIRTUAL VISIT (OUTPATIENT)
Dept: FAMILY MEDICINE | Facility: CLINIC | Age: 38
End: 2023-08-03
Payer: COMMERCIAL

## 2023-08-03 DIAGNOSIS — F90.9 ATTENTION DEFICIT HYPERACTIVITY DISORDER (ADHD), UNSPECIFIED ADHD TYPE: ICD-10-CM

## 2023-08-03 DIAGNOSIS — F41.9 ANXIETY: Primary | ICD-10-CM

## 2023-08-03 PROBLEM — F32.0 CURRENT MILD EPISODE OF MAJOR DEPRESSIVE DISORDER WITHOUT PRIOR EPISODE (H): Status: RESOLVED | Noted: 2022-08-18 | Resolved: 2023-08-03

## 2023-08-03 PROCEDURE — 96127 BRIEF EMOTIONAL/BEHAV ASSMT: CPT | Mod: 95 | Performed by: STUDENT IN AN ORGANIZED HEALTH CARE EDUCATION/TRAINING PROGRAM

## 2023-08-03 PROCEDURE — 99214 OFFICE O/P EST MOD 30 MIN: CPT | Mod: 95 | Performed by: STUDENT IN AN ORGANIZED HEALTH CARE EDUCATION/TRAINING PROGRAM

## 2023-08-03 RX ORDER — DEXTROAMPHETAMINE SACCHARATE, AMPHETAMINE ASPARTATE, DEXTROAMPHETAMINE SULFATE AND AMPHETAMINE SULFATE 3.75; 3.75; 3.75; 3.75 MG/1; MG/1; MG/1; MG/1
15 TABLET ORAL DAILY
Qty: 30 TABLET | Refills: 0 | Status: SHIPPED | OUTPATIENT
Start: 2023-08-11 | End: 2023-09-11

## 2023-08-03 RX ORDER — DEXTROAMPHETAMINE SACCHARATE, AMPHETAMINE ASPARTATE MONOHYDRATE, DEXTROAMPHETAMINE SULFATE AND AMPHETAMINE SULFATE 3.75; 3.75; 3.75; 3.75 MG/1; MG/1; MG/1; MG/1
15 CAPSULE, EXTENDED RELEASE ORAL DAILY
Qty: 30 CAPSULE | Refills: 0 | Status: SHIPPED | OUTPATIENT
Start: 2023-08-11 | End: 2023-09-11

## 2023-08-03 ASSESSMENT — PATIENT HEALTH QUESTIONNAIRE - PHQ9
5. POOR APPETITE OR OVEREATING: NOT AT ALL
SUM OF ALL RESPONSES TO PHQ QUESTIONS 1-9: 0

## 2023-08-03 ASSESSMENT — ANXIETY QUESTIONNAIRES
7. FEELING AFRAID AS IF SOMETHING AWFUL MIGHT HAPPEN: NOT AT ALL
5. BEING SO RESTLESS THAT IT IS HARD TO SIT STILL: NOT AT ALL
1. FEELING NERVOUS, ANXIOUS, OR ON EDGE: NOT AT ALL
3. WORRYING TOO MUCH ABOUT DIFFERENT THINGS: NOT AT ALL
GAD7 TOTAL SCORE: 0
IF YOU CHECKED OFF ANY PROBLEMS ON THIS QUESTIONNAIRE, HOW DIFFICULT HAVE THESE PROBLEMS MADE IT FOR YOU TO DO YOUR WORK, TAKE CARE OF THINGS AT HOME, OR GET ALONG WITH OTHER PEOPLE: NOT DIFFICULT AT ALL
GAD7 TOTAL SCORE: 0
2. NOT BEING ABLE TO STOP OR CONTROL WORRYING: NOT AT ALL
6. BECOMING EASILY ANNOYED OR IRRITABLE: NOT AT ALL

## 2023-08-03 NOTE — PROGRESS NOTES
Assessment & Plan   38-year-old male with past medical history of ADHD who presents for follow-up regarding ADHD.  Patient is doing well on his current medication Adderall 15 mg daily with a short acting dose in the afternoon.  Only change she would like is to go back up to 50 mg short acting from 10.  He is feeling less effectiveness on the 10.  We will increase his dose back up to this.  No side effects with the medication.  We also discussed his mood which had been a little worse lately and we increase his Wellbutrin from 150 back up to 300 mg daily.  He is feeling better on this higher dose.    1. Attention deficit hyperactivity disorder (ADHD), unspecified ADHD type  - amphetamine-dextroamphetamine (ADDERALL XR) 15 MG 24 hr capsule; Take 1 capsule (15 mg) by mouth daily  Dispense: 30 capsule; Refill: 0  - amphetamine-dextroamphetamine (ADDERALL) 15 MG tablet; Take 1 tablet (15 mg) by mouth daily  Dispense: 30 tablet; Refill: 0    2. Anxiety    Subjective   Chief Complaint   Patient presents with    Recheck Medication     HPI     Diagnosed in 2010. Difficulty with focusing at work.  Lots of racing thoughts.  Had an assessment through [facility] in [place]. Has been on adderral. no side effects with adderral. Good dose with 15 mg XR every day and 10 mg short acting as needed. Has kaushal on higher doses but felt more anxiety and crash on these more.    Urine drug screen: today 2/23  CSA 8/22 8/18/2022    11:47 AM 2/23/2023    11:50 AM 8/3/2023     5:06 PM   PHQ   PHQ-9 Total Score 1 0 0   Q9: Thoughts of better off dead/self-harm past 2 weeks Not at all Not at all Not at all         8/3/2023     5:07 PM   LUIZ-7 SCORE   Total Score 0             Objective           Vitals:  No vitals were obtained today due to virtual visit.    Physical Exam   GENERAL: Healthy, alert and no distress  EYES: Eyes grossly normal to inspection.  No discharge or erythema, or obvious scleral/conjunctival  abnormalities.  RESP: No audible wheeze, cough, or visible cyanosis.  No visible retractions or increased work of breathing.    SKIN: Visible skin clear. No significant rash, abnormal pigmentation or lesions.  NEURO: Cranial nerves grossly intact.  Mentation and speech appropriate for age.  PSYCH: Mentation appears normal, affect normal/bright, judgement and insight intact, normal speech and appearance well-groomed.    Video-Visit Details    Type of service:  Video Visit     Start time: 5:30 PM  End time: 5:44 PM    Originating Location (pt. Location): Home  Distant Location (provider location):  On-site  Platform used for Video Visit: [employer]

## 2023-09-11 ENCOUNTER — MYC REFILL (OUTPATIENT)
Dept: FAMILY MEDICINE | Facility: CLINIC | Age: 38
End: 2023-09-11
Payer: COMMERCIAL

## 2023-09-11 DIAGNOSIS — F90.9 ATTENTION DEFICIT HYPERACTIVITY DISORDER (ADHD), UNSPECIFIED ADHD TYPE: ICD-10-CM

## 2023-09-11 RX ORDER — DEXTROAMPHETAMINE SACCHARATE, AMPHETAMINE ASPARTATE, DEXTROAMPHETAMINE SULFATE AND AMPHETAMINE SULFATE 3.75; 3.75; 3.75; 3.75 MG/1; MG/1; MG/1; MG/1
15 TABLET ORAL DAILY
Qty: 30 TABLET | Refills: 0 | Status: SHIPPED | OUTPATIENT
Start: 2023-09-11 | End: 2023-10-10

## 2023-09-11 RX ORDER — DEXTROAMPHETAMINE SACCHARATE, AMPHETAMINE ASPARTATE MONOHYDRATE, DEXTROAMPHETAMINE SULFATE AND AMPHETAMINE SULFATE 3.75; 3.75; 3.75; 3.75 MG/1; MG/1; MG/1; MG/1
15 CAPSULE, EXTENDED RELEASE ORAL DAILY
Qty: 30 CAPSULE | Refills: 0 | Status: SHIPPED | OUTPATIENT
Start: 2023-09-11 | End: 2023-10-10

## 2023-09-11 NOTE — TELEPHONE ENCOUNTER
Medication last filled:     Last clinic visit: 8/3/2023    Clinic visit frequency required: Q 6  months    Next clinic visit: N/A    Controlled substance agreement on file: Yes:  Date 8/18/2021.    Urine Drug Screen on file:  Yes    Pending Prescriptions:                       Disp   Refills    amphetamine-dextroamphetamine (ADDERALL X*30 cap*0            Sig: Take 1 capsule (15 mg) by mouth daily    amphetamine-dextroamphetamine (ADDERALL) *30 tab*0            Sig: Take 1 tablet (15 mg) by mouth daily

## 2023-10-10 ENCOUNTER — MYC REFILL (OUTPATIENT)
Dept: FAMILY MEDICINE | Facility: CLINIC | Age: 38
End: 2023-10-10
Payer: COMMERCIAL

## 2023-10-10 DIAGNOSIS — F90.9 ATTENTION DEFICIT HYPERACTIVITY DISORDER (ADHD), UNSPECIFIED ADHD TYPE: ICD-10-CM

## 2023-10-10 RX ORDER — DEXTROAMPHETAMINE SACCHARATE, AMPHETAMINE ASPARTATE MONOHYDRATE, DEXTROAMPHETAMINE SULFATE AND AMPHETAMINE SULFATE 3.75; 3.75; 3.75; 3.75 MG/1; MG/1; MG/1; MG/1
15 CAPSULE, EXTENDED RELEASE ORAL DAILY
Qty: 30 CAPSULE | Refills: 0 | Status: SHIPPED | OUTPATIENT
Start: 2023-10-10 | End: 2023-11-07

## 2023-10-10 RX ORDER — DEXTROAMPHETAMINE SACCHARATE, AMPHETAMINE ASPARTATE, DEXTROAMPHETAMINE SULFATE AND AMPHETAMINE SULFATE 3.75; 3.75; 3.75; 3.75 MG/1; MG/1; MG/1; MG/1
15 TABLET ORAL DAILY
Qty: 30 TABLET | Refills: 0 | Status: SHIPPED | OUTPATIENT
Start: 2023-10-10 | End: 2023-11-07

## 2023-10-14 ENCOUNTER — HEALTH MAINTENANCE LETTER (OUTPATIENT)
Age: 38
End: 2023-10-14

## 2023-11-07 ENCOUNTER — MYC REFILL (OUTPATIENT)
Dept: FAMILY MEDICINE | Facility: CLINIC | Age: 38
End: 2023-11-07
Payer: COMMERCIAL

## 2023-11-07 DIAGNOSIS — F90.9 ATTENTION DEFICIT HYPERACTIVITY DISORDER (ADHD), UNSPECIFIED ADHD TYPE: ICD-10-CM

## 2023-11-07 RX ORDER — DEXTROAMPHETAMINE SACCHARATE, AMPHETAMINE ASPARTATE MONOHYDRATE, DEXTROAMPHETAMINE SULFATE AND AMPHETAMINE SULFATE 3.75; 3.75; 3.75; 3.75 MG/1; MG/1; MG/1; MG/1
15 CAPSULE, EXTENDED RELEASE ORAL DAILY
Qty: 30 CAPSULE | Refills: 0 | Status: SHIPPED | OUTPATIENT
Start: 2023-11-07 | End: 2023-12-07

## 2023-11-07 RX ORDER — DEXTROAMPHETAMINE SACCHARATE, AMPHETAMINE ASPARTATE, DEXTROAMPHETAMINE SULFATE AND AMPHETAMINE SULFATE 3.75; 3.75; 3.75; 3.75 MG/1; MG/1; MG/1; MG/1
15 TABLET ORAL DAILY
Qty: 30 TABLET | Refills: 0 | Status: SHIPPED | OUTPATIENT
Start: 2023-11-07 | End: 2023-12-07

## 2023-12-07 ENCOUNTER — MYC REFILL (OUTPATIENT)
Dept: FAMILY MEDICINE | Facility: CLINIC | Age: 38
End: 2023-12-07
Payer: COMMERCIAL

## 2023-12-07 DIAGNOSIS — F90.9 ATTENTION DEFICIT HYPERACTIVITY DISORDER (ADHD), UNSPECIFIED ADHD TYPE: ICD-10-CM

## 2023-12-07 RX ORDER — DEXTROAMPHETAMINE SACCHARATE, AMPHETAMINE ASPARTATE, DEXTROAMPHETAMINE SULFATE AND AMPHETAMINE SULFATE 3.75; 3.75; 3.75; 3.75 MG/1; MG/1; MG/1; MG/1
15 TABLET ORAL DAILY
Qty: 30 TABLET | Refills: 0 | Status: SHIPPED | OUTPATIENT
Start: 2023-12-07 | End: 2024-01-05

## 2023-12-07 RX ORDER — DEXTROAMPHETAMINE SACCHARATE, AMPHETAMINE ASPARTATE MONOHYDRATE, DEXTROAMPHETAMINE SULFATE AND AMPHETAMINE SULFATE 3.75; 3.75; 3.75; 3.75 MG/1; MG/1; MG/1; MG/1
15 CAPSULE, EXTENDED RELEASE ORAL DAILY
Qty: 30 CAPSULE | Refills: 0 | Status: SHIPPED | OUTPATIENT
Start: 2023-12-07 | End: 2024-01-05

## 2024-01-05 ENCOUNTER — MYC REFILL (OUTPATIENT)
Dept: FAMILY MEDICINE | Facility: CLINIC | Age: 39
End: 2024-01-05
Payer: COMMERCIAL

## 2024-01-05 DIAGNOSIS — F90.9 ATTENTION DEFICIT HYPERACTIVITY DISORDER (ADHD), UNSPECIFIED ADHD TYPE: ICD-10-CM

## 2024-01-05 RX ORDER — DEXTROAMPHETAMINE SACCHARATE, AMPHETAMINE ASPARTATE MONOHYDRATE, DEXTROAMPHETAMINE SULFATE AND AMPHETAMINE SULFATE 3.75; 3.75; 3.75; 3.75 MG/1; MG/1; MG/1; MG/1
15 CAPSULE, EXTENDED RELEASE ORAL DAILY
Qty: 30 CAPSULE | Refills: 0 | Status: SHIPPED | OUTPATIENT
Start: 2024-01-05 | End: 2024-02-08

## 2024-01-05 RX ORDER — DEXTROAMPHETAMINE SACCHARATE, AMPHETAMINE ASPARTATE, DEXTROAMPHETAMINE SULFATE AND AMPHETAMINE SULFATE 3.75; 3.75; 3.75; 3.75 MG/1; MG/1; MG/1; MG/1
15 TABLET ORAL DAILY
Qty: 30 TABLET | Refills: 0 | Status: SHIPPED | OUTPATIENT
Start: 2024-01-05 | End: 2024-02-08

## 2024-01-05 NOTE — TELEPHONE ENCOUNTER
This patient needs an in person follow up for ADHD in February. Please call and help him schedule this    Anuel Kan MD

## 2024-02-08 ENCOUNTER — VIRTUAL VISIT (OUTPATIENT)
Dept: FAMILY MEDICINE | Facility: CLINIC | Age: 39
End: 2024-02-08
Payer: COMMERCIAL

## 2024-02-08 DIAGNOSIS — E78.5 HYPERLIPIDEMIA LDL GOAL <130: ICD-10-CM

## 2024-02-08 DIAGNOSIS — F90.9 ATTENTION DEFICIT HYPERACTIVITY DISORDER (ADHD), UNSPECIFIED ADHD TYPE: Primary | ICD-10-CM

## 2024-02-08 PROCEDURE — 99213 OFFICE O/P EST LOW 20 MIN: CPT | Mod: 95 | Performed by: STUDENT IN AN ORGANIZED HEALTH CARE EDUCATION/TRAINING PROGRAM

## 2024-02-08 RX ORDER — DEXTROAMPHETAMINE SACCHARATE, AMPHETAMINE ASPARTATE, DEXTROAMPHETAMINE SULFATE AND AMPHETAMINE SULFATE 3.75; 3.75; 3.75; 3.75 MG/1; MG/1; MG/1; MG/1
15 TABLET ORAL DAILY
Qty: 30 TABLET | Refills: 0 | Status: SHIPPED | OUTPATIENT
Start: 2024-02-08 | End: 2024-03-06

## 2024-02-08 RX ORDER — DEXTROAMPHETAMINE SACCHARATE, AMPHETAMINE ASPARTATE MONOHYDRATE, DEXTROAMPHETAMINE SULFATE AND AMPHETAMINE SULFATE 3.75; 3.75; 3.75; 3.75 MG/1; MG/1; MG/1; MG/1
15 CAPSULE, EXTENDED RELEASE ORAL DAILY
Qty: 30 CAPSULE | Refills: 0 | Status: SHIPPED | OUTPATIENT
Start: 2024-02-08 | End: 2024-03-06

## 2024-02-08 ASSESSMENT — PATIENT HEALTH QUESTIONNAIRE - PHQ9
SUM OF ALL RESPONSES TO PHQ QUESTIONS 1-9: 0
10. IF YOU CHECKED OFF ANY PROBLEMS, HOW DIFFICULT HAVE THESE PROBLEMS MADE IT FOR YOU TO DO YOUR WORK, TAKE CARE OF THINGS AT HOME, OR GET ALONG WITH OTHER PEOPLE: NOT DIFFICULT AT ALL
SUM OF ALL RESPONSES TO PHQ QUESTIONS 1-9: 0

## 2024-02-08 ASSESSMENT — ANXIETY QUESTIONNAIRES
GAD7 TOTAL SCORE: 0
7. FEELING AFRAID AS IF SOMETHING AWFUL MIGHT HAPPEN: NOT AT ALL
6. BECOMING EASILY ANNOYED OR IRRITABLE: NOT AT ALL
5. BEING SO RESTLESS THAT IT IS HARD TO SIT STILL: NOT AT ALL
2. NOT BEING ABLE TO STOP OR CONTROL WORRYING: NOT AT ALL
3. WORRYING TOO MUCH ABOUT DIFFERENT THINGS: NOT AT ALL
7. FEELING AFRAID AS IF SOMETHING AWFUL MIGHT HAPPEN: NOT AT ALL
GAD7 TOTAL SCORE: 0
GAD7 TOTAL SCORE: 0
4. TROUBLE RELAXING: NOT AT ALL
1. FEELING NERVOUS, ANXIOUS, OR ON EDGE: NOT AT ALL

## 2024-02-08 NOTE — PROGRESS NOTES
Assessment & Plan   38-year-old male with history history of ADHD and anxiety.  Presents in follow-up regarding ADHD.  Patient doing well on his Adderall 15 mg in the morning and 15 mg short acting in the afternoon.  No side effects.  Was supposed to be a in person appointment and we will have him come in for a urine drug screen, CSA, blood pressure check.  Nursing called and left message for him to do this.    1. Attention deficit hyperactivity disorder (ADHD), unspecified ADHD type  - Urine Drug Screen; Future  - amphetamine-dextroamphetamine (ADDERALL XR) 15 MG 24 hr capsule; Take 1 capsule (15 mg) by mouth daily  Dispense: 30 capsule; Refill: 0  - amphetamine-dextroamphetamine (ADDERALL) 15 MG tablet; Take 1 tablet (15 mg) by mouth daily  Dispense: 30 tablet; Refill: 0    2. Hyperlipidemia LDL goal <130  - Lipid panel reflex to direct LDL Fasting; Future    Subjective   Chief Complaint   Patient presents with    Recheck Medication       HPI     ADD Overview  Hx:  Diagnosed in 2010. Difficulty with focusing at work.  Lots of racing thoughts.  Had an assessment through [facility] in [place]. Has been on adderral. no side effects with adderral. Good dose with 15 mg XR every day and 10 mg short acting as needed. Has kaushal on higher doses but felt more anxiety and crash on these more.    8/23: went back up on short acting to 15 mg daily as he felt less effectiveness on 10 mg    2/24:  Patient feels like his medication is going well. He noticed a subtle difference in the short acting dose from 10 to 15 mg daily. No side effects. He takes the long acting adderral at about 7 am and short acting dose at 2-3 pm in afternoon.     Weight:  Wt Readings from Last 2 Encounters:   02/23/23 87.2 kg (192 lb 3 oz)   08/18/22 83 kg (183 lb)        BP:  BP Readings from Last 6 Encounters:   02/23/23 120/80   08/18/22 108/82   03/10/22 122/84   08/05/21 110/74   07/29/21 (!) 131/93   07/23/21 116/82        Medication:  Adderral 15 mg XR daily  Adderral 15 daily    Urine Drug Screen:  Urine drug screen: today 2/23    CSA 8/22  CSA:  CSA -- Encounter Level:    CSA: None found at the encounter level.       CSA -- Patient Level:     [Media Unavailable] Controlled Substance Agreement - Non - Opioid - Scan on 8/18/2021  3:07 PM: NON-OPIOID CONTROLLED SUBSTANCE AGREEMENT   [Media Unavailable] Controlled Substance Agreement - Non - Opioid - Scan on 10/30/2020   [Media Unavailable] Controlled Substance Agreement - Non - Opioid - Scan on 9/13/2019              Objective       Vitals:  No vitals were obtained today due to virtual visit.    Physical Exam   EYES: Eyes grossly normal to inspection.  No discharge or erythema, or obvious scleral/conjunctival abnormalities.  SKIN: Visible skin clear. No significant rash, abnormal pigmentation or lesions.  NEURO: Cranial nerves grossly intact.  Mentation and speech appropriate for age.  GENERAL: Healthy, alert and no distress  RESP: No audible wheeze, cough, or visible cyanosis.  No visible retractions or increased work of breathing.    PSYCH: Mentation appears normal, affect normal/bright, judgement and insight intact, normal speech and appearance well-groomed.      Video-Visit Details    Type of service:  Video Visit     Start time: 4:35 PM  End time: 4:55 PM    Originating Location (pt. Location): Home  Distant Location (provider location):  On-site  Platform used for Video Visit: [employer]

## 2024-02-15 ENCOUNTER — LAB (OUTPATIENT)
Dept: LAB | Facility: CLINIC | Age: 39
End: 2024-02-15
Payer: COMMERCIAL

## 2024-02-15 DIAGNOSIS — E78.5 HYPERLIPIDEMIA LDL GOAL <130: ICD-10-CM

## 2024-02-15 DIAGNOSIS — F90.9 ATTENTION DEFICIT HYPERACTIVITY DISORDER (ADHD), UNSPECIFIED ADHD TYPE: ICD-10-CM

## 2024-02-15 LAB
AMPHETAMINES UR QL SCN: ABNORMAL
BARBITURATES UR QL SCN: ABNORMAL
BENZODIAZ UR QL SCN: ABNORMAL
BZE UR QL SCN: ABNORMAL
CANNABINOIDS UR QL SCN: ABNORMAL
FENTANYL UR QL: ABNORMAL
OPIATES UR QL SCN: ABNORMAL
PCP QUAL URINE (ROCHE): ABNORMAL

## 2024-02-15 PROCEDURE — 36415 COLL VENOUS BLD VENIPUNCTURE: CPT

## 2024-02-15 PROCEDURE — 80307 DRUG TEST PRSMV CHEM ANLYZR: CPT

## 2024-02-15 PROCEDURE — 80061 LIPID PANEL: CPT

## 2024-02-16 LAB
CHOLEST SERPL-MCNC: 251 MG/DL
FASTING STATUS PATIENT QL REPORTED: ABNORMAL
HDLC SERPL-MCNC: 69 MG/DL
LDLC SERPL CALC-MCNC: 160 MG/DL
NONHDLC SERPL-MCNC: 182 MG/DL
TRIGL SERPL-MCNC: 108 MG/DL

## 2024-02-16 NOTE — RESULT ENCOUNTER NOTE
Grady,  Your results from your recent clinic visit show:  Your urine drug screen was as expected  Your lipids look ok and I used these as well as other factors from your history to calculate your 10 year risk of having something like a heart attack (ASCVD risk) and it was low risk. Just continue to work on exercise and diet to maintain this low risk.    The 10-year ASCVD risk score (Diego HEARD, et al., 2019) is: 1%    Values used to calculate the score:      Age: 38 years      Sex: Male      Is Non- : No      Diabetic: No      Tobacco smoker: No      Systolic Blood Pressure: 120 mmHg      Is BP treated: No      HDL Cholesterol: 69 mg/dL      Total Cholesterol: 251 mg/dL        If you have more questions please call the clinic at [phone] or send me a [email or web address] message    Dr. Anuel Kan

## 2024-03-06 ENCOUNTER — MYC REFILL (OUTPATIENT)
Dept: FAMILY MEDICINE | Facility: CLINIC | Age: 39
End: 2024-03-06
Payer: COMMERCIAL

## 2024-03-06 DIAGNOSIS — F90.9 ATTENTION DEFICIT HYPERACTIVITY DISORDER (ADHD), UNSPECIFIED ADHD TYPE: ICD-10-CM

## 2024-03-07 RX ORDER — DEXTROAMPHETAMINE SACCHARATE, AMPHETAMINE ASPARTATE, DEXTROAMPHETAMINE SULFATE AND AMPHETAMINE SULFATE 3.75; 3.75; 3.75; 3.75 MG/1; MG/1; MG/1; MG/1
15 TABLET ORAL DAILY
Qty: 30 TABLET | Refills: 0 | Status: SHIPPED | OUTPATIENT
Start: 2024-03-07 | End: 2024-04-06

## 2024-03-07 RX ORDER — DEXTROAMPHETAMINE SACCHARATE, AMPHETAMINE ASPARTATE MONOHYDRATE, DEXTROAMPHETAMINE SULFATE AND AMPHETAMINE SULFATE 3.75; 3.75; 3.75; 3.75 MG/1; MG/1; MG/1; MG/1
15 CAPSULE, EXTENDED RELEASE ORAL DAILY
Qty: 30 CAPSULE | Refills: 0 | Status: SHIPPED | OUTPATIENT
Start: 2024-03-07 | End: 2024-04-06

## 2024-04-06 ENCOUNTER — MYC REFILL (OUTPATIENT)
Dept: FAMILY MEDICINE | Facility: CLINIC | Age: 39
End: 2024-04-06
Payer: COMMERCIAL

## 2024-04-06 DIAGNOSIS — F90.9 ATTENTION DEFICIT HYPERACTIVITY DISORDER (ADHD), UNSPECIFIED ADHD TYPE: ICD-10-CM

## 2024-04-08 RX ORDER — DEXTROAMPHETAMINE SACCHARATE, AMPHETAMINE ASPARTATE, DEXTROAMPHETAMINE SULFATE AND AMPHETAMINE SULFATE 3.75; 3.75; 3.75; 3.75 MG/1; MG/1; MG/1; MG/1
15 TABLET ORAL DAILY
Qty: 30 TABLET | Refills: 0 | Status: SHIPPED | OUTPATIENT
Start: 2024-04-08 | End: 2024-05-07

## 2024-04-08 RX ORDER — DEXTROAMPHETAMINE SACCHARATE, AMPHETAMINE ASPARTATE MONOHYDRATE, DEXTROAMPHETAMINE SULFATE AND AMPHETAMINE SULFATE 3.75; 3.75; 3.75; 3.75 MG/1; MG/1; MG/1; MG/1
15 CAPSULE, EXTENDED RELEASE ORAL DAILY
Qty: 30 CAPSULE | Refills: 0 | Status: SHIPPED | OUTPATIENT
Start: 2024-04-08 | End: 2024-05-07

## 2024-04-25 NOTE — TELEPHONE ENCOUNTER
----- Message from Anya Ryan MD sent at 4/25/2024  8:43 AM CDT -----  Please notify patient of urine culture results.  Recollect specimen if pt is still having symptoms.   Last clinic visit: 8/5/2021 [place]    Clinic visit frequency required: Q 6  months    Next clinic visit: 8/18/2022 [place]    Pending Prescriptions:                       Disp   Refills    amphetamine-dextroamphetamine (ADDERALL X*30 cap*0            Sig: Take 1 capsule (15 mg) by mouth daily    amphetamine-dextroamphetamine (ADDERALL) *30 tab*0            Sig: Take 1 tablet (10 mg) by mouth daily

## 2024-05-07 ENCOUNTER — MYC REFILL (OUTPATIENT)
Dept: FAMILY MEDICINE | Facility: CLINIC | Age: 39
End: 2024-05-07
Payer: COMMERCIAL

## 2024-05-07 DIAGNOSIS — F90.9 ATTENTION DEFICIT HYPERACTIVITY DISORDER (ADHD), UNSPECIFIED ADHD TYPE: ICD-10-CM

## 2024-05-07 RX ORDER — DEXTROAMPHETAMINE SACCHARATE, AMPHETAMINE ASPARTATE, DEXTROAMPHETAMINE SULFATE AND AMPHETAMINE SULFATE 3.75; 3.75; 3.75; 3.75 MG/1; MG/1; MG/1; MG/1
15 TABLET ORAL DAILY
Qty: 30 TABLET | Refills: 0 | Status: SHIPPED | OUTPATIENT
Start: 2024-05-07 | End: 2024-06-03

## 2024-05-07 RX ORDER — DEXTROAMPHETAMINE SACCHARATE, AMPHETAMINE ASPARTATE MONOHYDRATE, DEXTROAMPHETAMINE SULFATE AND AMPHETAMINE SULFATE 3.75; 3.75; 3.75; 3.75 MG/1; MG/1; MG/1; MG/1
15 CAPSULE, EXTENDED RELEASE ORAL DAILY
Qty: 30 CAPSULE | Refills: 0 | Status: SHIPPED | OUTPATIENT
Start: 2024-05-07 | End: 2024-06-03

## 2024-06-03 ENCOUNTER — MYC REFILL (OUTPATIENT)
Dept: FAMILY MEDICINE | Facility: CLINIC | Age: 39
End: 2024-06-03
Payer: COMMERCIAL

## 2024-06-03 DIAGNOSIS — F90.9 ATTENTION DEFICIT HYPERACTIVITY DISORDER (ADHD), UNSPECIFIED ADHD TYPE: ICD-10-CM

## 2024-06-04 RX ORDER — DEXTROAMPHETAMINE SACCHARATE, AMPHETAMINE ASPARTATE MONOHYDRATE, DEXTROAMPHETAMINE SULFATE AND AMPHETAMINE SULFATE 3.75; 3.75; 3.75; 3.75 MG/1; MG/1; MG/1; MG/1
15 CAPSULE, EXTENDED RELEASE ORAL DAILY
Qty: 30 CAPSULE | Refills: 0 | Status: SHIPPED | OUTPATIENT
Start: 2024-06-04 | End: 2024-07-02

## 2024-06-04 RX ORDER — DEXTROAMPHETAMINE SACCHARATE, AMPHETAMINE ASPARTATE, DEXTROAMPHETAMINE SULFATE AND AMPHETAMINE SULFATE 3.75; 3.75; 3.75; 3.75 MG/1; MG/1; MG/1; MG/1
15 TABLET ORAL DAILY
Qty: 30 TABLET | Refills: 0 | Status: SHIPPED | OUTPATIENT
Start: 2024-06-04 | End: 2024-07-02

## 2024-07-02 ENCOUNTER — MYC REFILL (OUTPATIENT)
Dept: FAMILY MEDICINE | Facility: CLINIC | Age: 39
End: 2024-07-02
Payer: COMMERCIAL

## 2024-07-02 DIAGNOSIS — F41.9 ANXIETY: ICD-10-CM

## 2024-07-02 DIAGNOSIS — F90.9 ATTENTION DEFICIT HYPERACTIVITY DISORDER (ADHD), UNSPECIFIED ADHD TYPE: ICD-10-CM

## 2024-07-02 RX ORDER — DEXTROAMPHETAMINE SACCHARATE, AMPHETAMINE ASPARTATE MONOHYDRATE, DEXTROAMPHETAMINE SULFATE AND AMPHETAMINE SULFATE 3.75; 3.75; 3.75; 3.75 MG/1; MG/1; MG/1; MG/1
15 CAPSULE, EXTENDED RELEASE ORAL DAILY
Qty: 30 CAPSULE | Refills: 0 | Status: SHIPPED | OUTPATIENT
Start: 2024-07-02 | End: 2024-08-01

## 2024-07-02 RX ORDER — BUPROPION HYDROCHLORIDE 150 MG/1
300 TABLET ORAL DAILY
Qty: 180 TABLET | Refills: 3 | Status: SHIPPED | OUTPATIENT
Start: 2024-07-02

## 2024-07-02 RX ORDER — DEXTROAMPHETAMINE SACCHARATE, AMPHETAMINE ASPARTATE, DEXTROAMPHETAMINE SULFATE AND AMPHETAMINE SULFATE 3.75; 3.75; 3.75; 3.75 MG/1; MG/1; MG/1; MG/1
15 TABLET ORAL DAILY
Qty: 30 TABLET | Refills: 0 | Status: SHIPPED | OUTPATIENT
Start: 2024-07-02 | End: 2024-08-01

## 2024-07-02 NOTE — TELEPHONE ENCOUNTER
Refilled. Please call and inform patient he needs an in person ADHD visit in August.     Anuel Kan MD

## 2024-07-02 NOTE — TELEPHONE ENCOUNTER
Appointments in Next Year      Aug 01, 2024 12:00 PM  (Arrive by 11:40 AM)  Provider Visit with Anuel Wang MD  [facility] ([facility]) [phone]

## 2024-08-01 ENCOUNTER — OFFICE VISIT (OUTPATIENT)
Dept: FAMILY MEDICINE | Facility: CLINIC | Age: 39
End: 2024-08-01
Payer: COMMERCIAL

## 2024-08-01 VITALS
OXYGEN SATURATION: 96 % | HEIGHT: 73 IN | TEMPERATURE: 97 F | WEIGHT: 200 LBS | HEART RATE: 64 BPM | RESPIRATION RATE: 20 BRPM | SYSTOLIC BLOOD PRESSURE: 110 MMHG | BODY MASS INDEX: 26.51 KG/M2 | DIASTOLIC BLOOD PRESSURE: 80 MMHG

## 2024-08-01 DIAGNOSIS — F41.9 ANXIETY: Primary | ICD-10-CM

## 2024-08-01 DIAGNOSIS — F90.9 ATTENTION DEFICIT HYPERACTIVITY DISORDER (ADHD), UNSPECIFIED ADHD TYPE: ICD-10-CM

## 2024-08-01 PROCEDURE — G0480 DRUG TEST DEF 1-7 CLASSES: HCPCS | Mod: 90 | Performed by: STUDENT IN AN ORGANIZED HEALTH CARE EDUCATION/TRAINING PROGRAM

## 2024-08-01 PROCEDURE — 99000 SPECIMEN HANDLING OFFICE-LAB: CPT | Performed by: STUDENT IN AN ORGANIZED HEALTH CARE EDUCATION/TRAINING PROGRAM

## 2024-08-01 PROCEDURE — 99214 OFFICE O/P EST MOD 30 MIN: CPT | Performed by: STUDENT IN AN ORGANIZED HEALTH CARE EDUCATION/TRAINING PROGRAM

## 2024-08-01 PROCEDURE — 80307 DRUG TEST PRSMV CHEM ANLYZR: CPT | Performed by: STUDENT IN AN ORGANIZED HEALTH CARE EDUCATION/TRAINING PROGRAM

## 2024-08-01 RX ORDER — DEXTROAMPHETAMINE SACCHARATE, AMPHETAMINE ASPARTATE MONOHYDRATE, DEXTROAMPHETAMINE SULFATE AND AMPHETAMINE SULFATE 3.75; 3.75; 3.75; 3.75 MG/1; MG/1; MG/1; MG/1
15 CAPSULE, EXTENDED RELEASE ORAL DAILY
Qty: 30 CAPSULE | Refills: 0 | Status: SHIPPED | OUTPATIENT
Start: 2024-08-01 | End: 2024-09-09

## 2024-08-01 RX ORDER — DEXTROAMPHETAMINE SACCHARATE, AMPHETAMINE ASPARTATE, DEXTROAMPHETAMINE SULFATE AND AMPHETAMINE SULFATE 3.75; 3.75; 3.75; 3.75 MG/1; MG/1; MG/1; MG/1
15 TABLET ORAL DAILY
Qty: 30 TABLET | Refills: 0 | Status: SHIPPED | OUTPATIENT
Start: 2024-08-01 | End: 2024-09-09

## 2024-08-01 ASSESSMENT — PATIENT HEALTH QUESTIONNAIRE - PHQ9: SUM OF ALL RESPONSES TO PHQ QUESTIONS 1-9: 0

## 2024-08-01 ASSESSMENT — PAIN SCALES - GENERAL: PAINLEVEL: NO PAIN (0)

## 2024-08-01 NOTE — LETTER
[facility]  08/01/24  Patient: Grady Koo  YOB: 1985  Medical Record Number: [id number]                                                                                  Non-Opioid Controlled Substance Agreement    This is an agreement between you and your provider regarding safe and appropriate use of controlled substances prescribed by your care team. Controlled substances are?medicines that can cause physical and mental dependence (abuse).     There are strict laws about having and using these medicines. We here at [facility] are  committed to working with you in your efforts to get better. To support you in this work, we'll help you schedule regular office appointments for medicine refills. If we must cancel or change your appointment for any reason, we'll make sure you have enough medicine to last until your next appointment.     As a Provider, I will:   Listen carefully to your concerns while treating you with respect.   Recommend a treatment plan that I believe is in your best interest and may involve therapies other than medicine.    Talk with you often about the possible benefits and the risk of harm of any medicine that we prescribe for you.  Assess the safety of this medicine and check how well it works.    Provide a plan on how to taper (discontinue or go off) using this medicine if the decision is made to stop its use.      ::  As a Patient, I understand controlled substances:     Are prescribed by my care provider to help me function or work and manage my condition(s).?  Are strong medicines and can cause serious side effects.     Need to be taken exactly as prescribed.?Combining controlled substances with certain medicines or chemicals (such as illegal drugs, alcohol, sedatives, sleeping pills, and benzodiazepines) can be dangerous or even fatal.? If I stop taking my medicines suddenly, I may have severe withdrawal symptoms.     The risks, benefits, and  side effects of these medicine(s) were explained to me. I agree that:    I will take part in other treatments as advised by my care team. This may be psychiatry or counseling, physical therapy, behavioral therapy, group treatment or a referral to specialist.    I will keep all my appointments and understand this is part of the monitoring of controlled substances.?My care team may require an office visit for EVERY controlled substance refill. If I miss appointments or don t follow instructions, my care team may stop my medicine    I will take my medicines as prescribed. I will not change the dose or schedule unless my care team tells me to. There will be no refills if I run out early.      I may be asked to come to the clinic and complete a urine drug test or complete a pill count. If I don t give a urine sample or participate in a pill count, the care team may stop my medicine.    I will only receive controlled substance prescriptions from this clinic. If I am treated by another provider, I will tell them that I am taking controlled substances and that I have a treatment agreement with this provider. I will inform my [facility] care team within one business day if I am given a prescription for any controlled substance by another healthcare provider. My [facility] care team can contact other providers and pharmacists about my use of any medicines.    It is up to me to make sure that I don't run out of my medicines on weekends or holidays.?If my care team is willing to refill my prescription without a visit, I must request refills only during office hours. Refills may take up to 3 business days to process. I will use one pharmacy to fill all my controlled substance prescriptions. I will notify the clinic about any changes to my insurance or medicine availability.    I am responsible for my prescriptions. If the medicine/prescription is lost, stolen or destroyed, it will not be replaced.?I also agree not  to share controlled substance medicines with anyone.     I am aware I should not use any illegal or recreational drugs. I agree not to drink alcohol unless my care team says I can.     If I enroll in the [place] Medical Cannabis program, I will tell my care team before my next refill.    I will tell my care team right away if I become pregnant, have a new medical problem treated outside of my regular clinic, or have a change in my medicines.     I understand that this medicine can affect my thinking, judgment and reaction time.? Alcohol and drugs affect the brain and body, which can affect the safety of my driving. Being under the influence of alcohol or drugs can affect my decision-making, behaviors, personal safety and the safety of others. Driving while impaired (DWI) can occur if a person is driving, operating or in physical control of a car, motorcycle, boat, snowmobile, ATV, motorbike, off-road vehicle or any other motor vehicle ([place] Statute 169A.20). I understand the risk if I choose to drive or operate any vehicle or machinery.    I understand that if I do not follow any of the conditions above, my prescriptions or treatment may be stopped or changed.   I agree that my provider, clinic care team and pharmacy may work with any city, state or federal law enforcement agency that investigates the misuse, sale or other diversion of my controlled medicine. I will allow my provider to discuss my care with, or share a copy of, this agreement with any other treating provider, pharmacy or emergency room where I receive care.     I have read this agreement and have asked questions about anything I did not understand.    ________________________________________________________  Patient Signature - Grady Koo     ___________________                   Date     ________________________________________________________  Provider Signature - Anuel Wang MD       ___________________                   Date      ________________________________________________________  Witness Signature (required if provider not present while patient signing)          ___________________                   Date

## 2024-08-01 NOTE — PROGRESS NOTES
Assessment/ Plan   39-year-old male who presents for follow-up regarding ADHD and anxiety.  Patient is doing well.  No concerns with his regimen and no side effects.  He will continue on this as below.    1. Attention deficit hyperactivity disorder (ADHD), unspecified ADHD type  - amphetamine-dextroamphetamine (ADDERALL XR) 15 MG 24 hr capsule; Take 1 capsule (15 mg) by mouth daily  Dispense: 30 capsule; Refill: 0  - amphetamine-dextroamphetamine (ADDERALL) 15 MG tablet; Take 1 tablet (15 mg) by mouth daily  Dispense: 30 tablet; Refill: 0  - Urine Drug Screen; Future    2. Anxiety  Depression/LUIZ HX:  7/24:  Wellbutrin 300 mg daily. Feels this helps his anxiety well. No side effects. LUIZ 7 is 0 today.    Current Treatment:  Wellbutrin 300 mg daily.        2/23/2023    11:50 AM 8/3/2023     5:06 PM 2/8/2024    10:31 AM   PHQ   PHQ-9 Total Score 0 0 0   Q9: Thoughts of better off dead/self-harm past 2 weeks Not at all Not at all Not at all           8/3/2023     5:07 PM 2/8/2024     4:38 PM   LUIZ-7 SCORE   Total Score  0 (minimal anxiety)   Total Score 0 0         Follow-up in:     Anuel Kan MD    Counseling  Appropriate preventive services were discussed with this patient, including applicable screening as appropriate for fall prevention, nutrition, physical activity, Tobacco-use cessation, weight loss and cognition    Subjective:     Grady Koo is a 39 year old male who presents for an annual exam.     Chief Complaint   Patient presents with    Recheck Medication     ADD Overview  Hx:  Diagnosed in 2010. Difficulty with focusing at work.  Lots of racing thoughts.  Had an assessment through [facility] in [place]. Has been on adderral. no side effects with adderral. Good dose with 15 mg XR every day and 10 mg short acting as needed. Has kaushal on higher doses but felt more anxiety and crash on these more.    8/23: went back up on short acting to 15 mg daily as he felt less effectiveness on 10  mg    2/24:  Patient feels like his medication is going well. He noticed a subtle difference in the short acting dose from 10 to 15 mg daily. No side effects. He takes the long acting adderral at about 7 am and short acting dose at 2-3 pm in afternoon. Visit scheduled as virtual. He will come in to do urine drug screen, CSA, and HTN check    8/24:  Patient will take long acting dose at 8 am and then take his short acting acting dose are 2-3 pm. He likes this regimen. Feels dosage is right. Has not lost weight. No problems with sleep.     Weight:  Wt Readings from Last 2 Encounters:   08/01/24 90.7 kg (200 lb)   02/23/23 87.2 kg (192 lb 3 oz)     BP:  BP Readings from Last 6 Encounters:   08/01/24 110/80   02/23/23 120/80   08/18/22 108/82   03/10/22 122/84   08/05/21 110/74   07/29/21 (!) 131/93       Medication:  Adderral 15 mg XR daily  Adderral 15 daily    Urine Drug Screen:  Urine drug screen: 2/24    CSA 8/22  CSA:  CSA -- Encounter Level:    CSA: None found at the encounter level.       CSA -- Patient Level:     [Media Unavailable] Controlled Substance Agreement - Non - Opioid - Scan on 8/18/2021  3:07 PM: NON-OPIOID CONTROLLED SUBSTANCE AGREEMENT   [Media Unavailable] Controlled Substance Agreement - Non - Opioid - Scan on 10/30/2020   [Media Unavailable] Controlled Substance Agreement - Non - Opioid - Scan on 9/13/2019            Immunization History   Administered Date(s) Administered    COVID-19 MONOVALENT 12+ (Pfizer) 11/10/2021, 12/01/2021    Flu, Unspecified 10/26/2017    Influenza Vaccine, 6+MO IM (QUADRIVALENT W/PRESERVATIVES) 10/26/2017, 11/20/2018, 09/06/2019    TDAP (Adacel,Boostrix) 10/26/2017     Immunization status: up to date and documented.     Current Outpatient Medications   Medication Sig Dispense Refill    amphetamine-dextroamphetamine (ADDERALL XR) 15 MG 24 hr capsule Take 1 capsule (15 mg) by mouth daily 30 capsule 0    amphetamine-dextroamphetamine (ADDERALL) 15 MG tablet Take 1 tablet  (15 mg) by mouth daily 30 tablet 0    buPROPion (WELLBUTRIN XL) 150 MG 24 hr tablet Take 2 tablets (300 mg) by mouth daily 180 tablet 3     Past Medical History:   Diagnosis Date    ADHD (attention deficit hyperactivity disorder) 9/23/2016    Anxiety 9/23/2016     Past Surgical History:   Procedure Laterality Date    CYST REMOVAL Left     behind left eye    REPAIR TENDON BICEPS DISTAL UPPER EXTREMITY Right      Morphine and Penicillins  Family History   Problem Relation Age of Onset    Hyperlipidemia Mother     Hyperlipidemia Father     Cancer No family hx of     Heart Disease No family hx of      Social History     Socioeconomic History    Marital status:      Spouse name: Not on file    Number of children: Not on file    Years of education: Not on file    Highest education level: Not on file   Occupational History    Not on file   Tobacco Use    Smoking status: Never    Smokeless tobacco: Never   Substance and Sexual Activity    Alcohol use: Yes     Comment: Alcoholic Drinks/day: social 2-3 times a week, 3-6    Drug use: No    Sexual activity: Not Currently     Partners: Female   Other Topics Concern    Not on file   Social History Narrative    Not on file     Social Determinants of Health     Financial Resource Strain: Not on file   Food Insecurity: Not on file   Transportation Needs: Not on file   Physical Activity: Not on file   Stress: Not on file   Social Connections: Not on file   Interpersonal Safety: Low Risk  (8/1/2024)    Interpersonal Safety     Do you feel physically and emotionally safe where you currently live?: Yes     Within the past 12 months, have you been hit, slapped, kicked or otherwise physically hurt by someone?: No     Within the past 12 months, have you been humiliated or emotionally abused in other ways by your partner or ex-partner?: No   Housing Stability: Not on file       Review of Systems  Complete ROS negative except as noted in the HPI    Objective:      Vitals:    08/01/24  "1141   BP: 110/80   Pulse: 64   Resp: 20   Temp: 97  F (36.1  C)   SpO2: 96%   Weight: 90.7 kg (200 lb)   Height: 1.854 m (6' 1\")       General appearance: Alert, cooperative, no distress, appears stated age  Head: Normocephalic, atraumatic, without obvious abnormality  EARS: TM's gray dull with structures seen bilaterally  Eyes: Pupils equal round, reactive.  Conjunctiva clear.  Nose: Nares normal, no drainage.  Throat: Lips, mucosa, tongue normal mucosa pink and moist  Neck: Supple, symmetric, trachea midline, no adenopathy.  No thyroid enlargement, tenderness or nodules.    Lungs: Clear to auscultation bilaterally, no wheezing or crackles present.  Respirations unlabored  Heart: Regular rate and rhythm, normal S1 and S2, no murmur, rub or gallop.    Anuel Wang MD    "

## 2024-08-05 LAB
AMPHET UR-MCNC: 4364 NG/ML
MDA UR-MCNC: <200 NG/ML
MDEA UR-MCNC: <200 NG/ML
MDMA UR-MCNC: <200 NG/ML
METHAMPHET UR-MCNC: <200 NG/ML
PHENTERMINE UR CFM-MCNC: <200 NG/ML

## 2024-08-06 NOTE — RESULT ENCOUNTER NOTE
Grady Koo  Your results from your recent clinic visit show:  Your urine drug screen was as expected    If you have more questions please call the clinic at [phone] or send me a [employer] message    Dr. Anuel Kan

## 2024-08-19 ENCOUNTER — MYC MEDICAL ADVICE (OUTPATIENT)
Dept: FAMILY MEDICINE | Facility: CLINIC | Age: 39
End: 2024-08-19
Payer: COMMERCIAL

## 2024-08-19 DIAGNOSIS — Z00.00 ROUTINE HISTORY AND PHYSICAL EXAMINATION OF ADULT: ICD-10-CM

## 2024-08-19 DIAGNOSIS — R73.9 HYPERGLYCEMIA: Primary | ICD-10-CM

## 2024-08-22 ENCOUNTER — LAB (OUTPATIENT)
Dept: LAB | Facility: CLINIC | Age: 39
End: 2024-08-22
Payer: COMMERCIAL

## 2024-08-22 DIAGNOSIS — Z00.00 ROUTINE HISTORY AND PHYSICAL EXAMINATION OF ADULT: ICD-10-CM

## 2024-08-22 DIAGNOSIS — R73.9 HYPERGLYCEMIA: ICD-10-CM

## 2024-08-22 LAB
CHOLEST SERPL-MCNC: 251 MG/DL
FASTING STATUS PATIENT QL REPORTED: NO
HBA1C MFR BLD: 4.9 % (ref 0–5.6)
HDLC SERPL-MCNC: 58 MG/DL
LDLC SERPL CALC-MCNC: 133 MG/DL
NONHDLC SERPL-MCNC: 193 MG/DL
TRIGL SERPL-MCNC: 298 MG/DL

## 2024-08-22 PROCEDURE — 36415 COLL VENOUS BLD VENIPUNCTURE: CPT

## 2024-08-22 PROCEDURE — 83036 HEMOGLOBIN GLYCOSYLATED A1C: CPT

## 2024-08-22 PROCEDURE — 80061 LIPID PANEL: CPT

## 2024-08-22 NOTE — TELEPHONE ENCOUNTER
Pt need to come in for lab work in order to get forms sign for [employer]   Needs     A1c  Triglyceride   Cholesterol

## 2024-08-23 NOTE — RESULT ENCOUNTER NOTE
Grady Koo  Your results from your recent clinic visit show:  Your lipids look ok and I used these as well as other factors from your history to calculate your 10 year risk of having something like a heart attack (ASCVD risk) and it was low risk. Just continue to work on exercise and diet to maintain this low risk.    The 10-year ASCVD risk score (Diego HEARD, et al., 2019) is: 1.2%    Values used to calculate the score:      Age: 39 years      Sex: Male      Is Non- : No      Diabetic: No      Tobacco smoker: No      Systolic Blood Pressure: 110 mmHg      Is BP treated: No      HDL Cholesterol: 58 mg/dL      Total Cholesterol: 251 mg/dL    Your hemoglobin A1c was normal.  This is a test looking for diabetes and measures your average blood sugars over the last 3 months.    If you have more questions please call the clinic at [phone] or send me a [employer] message    Dr. Anuel Kan

## 2024-09-09 ENCOUNTER — MYC MEDICAL ADVICE (OUTPATIENT)
Dept: FAMILY MEDICINE | Facility: CLINIC | Age: 39
End: 2024-09-09
Payer: COMMERCIAL

## 2024-09-09 ENCOUNTER — MYC REFILL (OUTPATIENT)
Dept: FAMILY MEDICINE | Facility: CLINIC | Age: 39
End: 2024-09-09
Payer: COMMERCIAL

## 2024-09-09 DIAGNOSIS — F90.9 ATTENTION DEFICIT HYPERACTIVITY DISORDER (ADHD), UNSPECIFIED ADHD TYPE: ICD-10-CM

## 2024-09-09 RX ORDER — DEXTROAMPHETAMINE SACCHARATE, AMPHETAMINE ASPARTATE, DEXTROAMPHETAMINE SULFATE AND AMPHETAMINE SULFATE 3.75; 3.75; 3.75; 3.75 MG/1; MG/1; MG/1; MG/1
15 TABLET ORAL DAILY
Qty: 30 TABLET | Refills: 0 | Status: SHIPPED | OUTPATIENT
Start: 2024-09-09

## 2024-09-09 RX ORDER — DEXTROAMPHETAMINE SACCHARATE, AMPHETAMINE ASPARTATE MONOHYDRATE, DEXTROAMPHETAMINE SULFATE AND AMPHETAMINE SULFATE 3.75; 3.75; 3.75; 3.75 MG/1; MG/1; MG/1; MG/1
15 CAPSULE, EXTENDED RELEASE ORAL DAILY
Qty: 30 CAPSULE | Refills: 0 | Status: SHIPPED | OUTPATIENT
Start: 2024-09-09

## 2024-09-09 NOTE — TELEPHONE ENCOUNTER
Called pt and inform him that I will be mailing the from to his address. A copy is on my desk if needed

## 2024-10-07 ENCOUNTER — MYC REFILL (OUTPATIENT)
Dept: FAMILY MEDICINE | Facility: CLINIC | Age: 39
End: 2024-10-07
Payer: COMMERCIAL

## 2024-10-07 DIAGNOSIS — F90.9 ATTENTION DEFICIT HYPERACTIVITY DISORDER (ADHD), UNSPECIFIED ADHD TYPE: ICD-10-CM

## 2024-10-08 RX ORDER — DEXTROAMPHETAMINE SACCHARATE, AMPHETAMINE ASPARTATE MONOHYDRATE, DEXTROAMPHETAMINE SULFATE AND AMPHETAMINE SULFATE 3.75; 3.75; 3.75; 3.75 MG/1; MG/1; MG/1; MG/1
15 CAPSULE, EXTENDED RELEASE ORAL DAILY
Qty: 30 CAPSULE | Refills: 0 | Status: SHIPPED | OUTPATIENT
Start: 2024-10-08 | End: 2024-11-05

## 2024-10-08 RX ORDER — DEXTROAMPHETAMINE SACCHARATE, AMPHETAMINE ASPARTATE, DEXTROAMPHETAMINE SULFATE AND AMPHETAMINE SULFATE 3.75; 3.75; 3.75; 3.75 MG/1; MG/1; MG/1; MG/1
15 TABLET ORAL DAILY
Qty: 30 TABLET | Refills: 0 | Status: SHIPPED | OUTPATIENT
Start: 2024-10-08 | End: 2024-11-05

## 2024-11-05 ENCOUNTER — MYC REFILL (OUTPATIENT)
Dept: FAMILY MEDICINE | Facility: CLINIC | Age: 39
End: 2024-11-05
Payer: COMMERCIAL

## 2024-11-05 DIAGNOSIS — F90.9 ATTENTION DEFICIT HYPERACTIVITY DISORDER (ADHD), UNSPECIFIED ADHD TYPE: ICD-10-CM

## 2024-11-06 RX ORDER — DEXTROAMPHETAMINE SACCHARATE, AMPHETAMINE ASPARTATE MONOHYDRATE, DEXTROAMPHETAMINE SULFATE AND AMPHETAMINE SULFATE 3.75; 3.75; 3.75; 3.75 MG/1; MG/1; MG/1; MG/1
15 CAPSULE, EXTENDED RELEASE ORAL DAILY
Qty: 30 CAPSULE | Refills: 0 | Status: SHIPPED | OUTPATIENT
Start: 2024-11-06

## 2024-11-06 RX ORDER — DEXTROAMPHETAMINE SACCHARATE, AMPHETAMINE ASPARTATE, DEXTROAMPHETAMINE SULFATE AND AMPHETAMINE SULFATE 3.75; 3.75; 3.75; 3.75 MG/1; MG/1; MG/1; MG/1
15 TABLET ORAL DAILY
Qty: 30 TABLET | Refills: 0 | Status: SHIPPED | OUTPATIENT
Start: 2024-11-06

## 2024-12-05 ENCOUNTER — MYC REFILL (OUTPATIENT)
Dept: FAMILY MEDICINE | Facility: CLINIC | Age: 39
End: 2024-12-05
Payer: COMMERCIAL

## 2024-12-05 DIAGNOSIS — F90.9 ATTENTION DEFICIT HYPERACTIVITY DISORDER (ADHD), UNSPECIFIED ADHD TYPE: ICD-10-CM

## 2024-12-05 RX ORDER — DEXTROAMPHETAMINE SACCHARATE, AMPHETAMINE ASPARTATE, DEXTROAMPHETAMINE SULFATE AND AMPHETAMINE SULFATE 3.75; 3.75; 3.75; 3.75 MG/1; MG/1; MG/1; MG/1
15 TABLET ORAL DAILY
Qty: 30 TABLET | Refills: 0 | Status: SHIPPED | OUTPATIENT
Start: 2024-12-05

## 2024-12-05 RX ORDER — DEXTROAMPHETAMINE SACCHARATE, AMPHETAMINE ASPARTATE MONOHYDRATE, DEXTROAMPHETAMINE SULFATE AND AMPHETAMINE SULFATE 3.75; 3.75; 3.75; 3.75 MG/1; MG/1; MG/1; MG/1
15 CAPSULE, EXTENDED RELEASE ORAL DAILY
Qty: 30 CAPSULE | Refills: 0 | Status: SHIPPED | OUTPATIENT
Start: 2024-12-05

## 2024-12-07 ENCOUNTER — HEALTH MAINTENANCE LETTER (OUTPATIENT)
Age: 39
End: 2024-12-07

## 2025-01-06 ENCOUNTER — MYC REFILL (OUTPATIENT)
Dept: FAMILY MEDICINE | Facility: CLINIC | Age: 40
End: 2025-01-06
Payer: COMMERCIAL

## 2025-01-06 DIAGNOSIS — F90.9 ATTENTION DEFICIT HYPERACTIVITY DISORDER (ADHD), UNSPECIFIED ADHD TYPE: ICD-10-CM

## 2025-01-06 RX ORDER — DEXTROAMPHETAMINE SACCHARATE, AMPHETAMINE ASPARTATE MONOHYDRATE, DEXTROAMPHETAMINE SULFATE AND AMPHETAMINE SULFATE 3.75; 3.75; 3.75; 3.75 MG/1; MG/1; MG/1; MG/1
15 CAPSULE, EXTENDED RELEASE ORAL DAILY
Qty: 30 CAPSULE | Refills: 0 | Status: SHIPPED | OUTPATIENT
Start: 2025-01-06

## 2025-01-06 RX ORDER — DEXTROAMPHETAMINE SACCHARATE, AMPHETAMINE ASPARTATE, DEXTROAMPHETAMINE SULFATE AND AMPHETAMINE SULFATE 3.75; 3.75; 3.75; 3.75 MG/1; MG/1; MG/1; MG/1
15 TABLET ORAL DAILY
Qty: 30 TABLET | Refills: 0 | Status: SHIPPED | OUTPATIENT
Start: 2025-01-06

## 2025-02-04 ENCOUNTER — MYC REFILL (OUTPATIENT)
Dept: FAMILY MEDICINE | Facility: CLINIC | Age: 40
End: 2025-02-04
Payer: COMMERCIAL

## 2025-02-04 DIAGNOSIS — F90.9 ATTENTION DEFICIT HYPERACTIVITY DISORDER (ADHD), UNSPECIFIED ADHD TYPE: ICD-10-CM

## 2025-02-04 RX ORDER — DEXTROAMPHETAMINE SACCHARATE, AMPHETAMINE ASPARTATE MONOHYDRATE, DEXTROAMPHETAMINE SULFATE AND AMPHETAMINE SULFATE 3.75; 3.75; 3.75; 3.75 MG/1; MG/1; MG/1; MG/1
15 CAPSULE, EXTENDED RELEASE ORAL DAILY
Qty: 30 CAPSULE | Refills: 0 | Status: SHIPPED | OUTPATIENT
Start: 2025-02-04

## 2025-02-04 RX ORDER — DEXTROAMPHETAMINE SACCHARATE, AMPHETAMINE ASPARTATE, DEXTROAMPHETAMINE SULFATE AND AMPHETAMINE SULFATE 3.75; 3.75; 3.75; 3.75 MG/1; MG/1; MG/1; MG/1
15 TABLET ORAL DAILY
Qty: 30 TABLET | Refills: 0 | Status: SHIPPED | OUTPATIENT
Start: 2025-02-04

## 2025-02-17 ENCOUNTER — OFFICE VISIT (OUTPATIENT)
Dept: FAMILY MEDICINE | Facility: CLINIC | Age: 40
End: 2025-02-17
Payer: COMMERCIAL

## 2025-02-17 VITALS
DIASTOLIC BLOOD PRESSURE: 74 MMHG | WEIGHT: 211 LBS | RESPIRATION RATE: 18 BRPM | HEART RATE: 55 BPM | OXYGEN SATURATION: 93 % | HEIGHT: 73 IN | BODY MASS INDEX: 27.96 KG/M2 | TEMPERATURE: 98.5 F | SYSTOLIC BLOOD PRESSURE: 124 MMHG

## 2025-02-17 DIAGNOSIS — K11.20 PAROTITIS: Primary | ICD-10-CM

## 2025-02-17 PROCEDURE — 99214 OFFICE O/P EST MOD 30 MIN: CPT | Performed by: FAMILY MEDICINE

## 2025-02-17 RX ORDER — LEVOFLOXACIN 500 MG/1
500 TABLET, FILM COATED ORAL DAILY
Qty: 7 TABLET | Refills: 0 | Status: SHIPPED | OUTPATIENT
Start: 2025-02-17

## 2025-02-17 RX ORDER — METRONIDAZOLE 500 MG/1
500 TABLET ORAL 2 TIMES DAILY
Qty: 14 TABLET | Refills: 0 | Status: SHIPPED | OUTPATIENT
Start: 2025-02-17 | End: 2025-02-24

## 2025-02-17 ASSESSMENT — PAIN SCALES - GENERAL: PAINLEVEL_OUTOF10: NO PAIN (0)

## 2025-02-17 NOTE — PROGRESS NOTES
"Grady Koo  /74   Pulse 55   Temp 98.5  F (36.9  C) (Oral)   Resp 18   Ht 1.854 m (6' 1\")   Wt 95.7 kg (211 lb)   SpO2 93%   BMI 27.84 kg/m       Assessment/Plan:                Grady was seen today for uri.    Diagnoses and all orders for this visit:    Parotitis  -     levofloxacin (LEVAQUIN) 500 MG tablet; Take 1 tablet (500 mg) by mouth daily.  -     metroNIDAZOLE (FLAGYL) 500 MG tablet; Take 1 tablet (500 mg) by mouth 2 times daily for 7 days.         DISCUSSION  He has focal swelling that seems to involve the left side parotid gland.  There is no intraoral abnormality to suggest that there is purulence or that there would be a dental infection as a cause of the symptoms.  I do not palpate any distinct lymphadenopathy to suggest lymphadenitis or a more specific lymph node process.  There is nothing in the ear or canal to suggest an infection process.  I do think this is representative of a parotid gland infection.  He is allergic to penicillin.  For this reason we have elected to try empiric treatment with Levaquin and metronidazole.  There is nothing to culture.  I discussed with him my rationale for making the diagnosis.  Discussed that if he is having worsening in any time he needs to contact us to consider additional evaluation which might include imaging.  If symptoms are not resolved upon the completion of the antibiotic course he should call and we would consider extending the course of the antibiotic treatment if indicated.  Subjective:     HPI:    Grady Koo is a 39 year old male is concerned with just not feeling well and having pain and swelling on the left side of his face near the angle of the jaw on the left side.    States he had a upper respiratory infection in early January.  He thought he had fairly quick symptomatic improvement without much thought but never felt he completely recovered.  About 2 weeks ago he states he started developing symptoms of feeling like there " was discomfort and some swelling on the left side of his face near the angle of the jaw.  He has noted some generalized discomfort in his throat but not any focal sore throat.  Does not feel like he has pain inside his ear.  Has not noted any bad taste in his mouth.  Reports having similar symptoms of swelling and pain in the left area of the jaw and was told he needed a root canal about 2 years ago.  States he had to be on a prolonged course of antibiotics after having the root canal before his symptoms finally improved.  He denies any other significant symptoms.  Feels he may get out of breath a little bit more easily but does not get chest pain.  Has not had unexpected weight loss night sweats or other more concerning symptoms.  Temperature is normal today.    ROS:  Complete review of systems is obtained.  Other than the specific considerations noted above complete review of systems is negative.      Objective:   Medications:  Current Outpatient Medications   Medication Sig Dispense Refill    amphetamine-dextroamphetamine (ADDERALL XR) 15 MG 24 hr capsule Take 1 capsule (15 mg) by mouth daily. 30 capsule 0    amphetamine-dextroamphetamine (ADDERALL) 15 MG tablet Take 1 tablet (15 mg) by mouth daily. 30 tablet 0    buPROPion (WELLBUTRIN XL) 150 MG 24 hr tablet Take 2 tablets (300 mg) by mouth daily 180 tablet 3    levofloxacin (LEVAQUIN) 500 MG tablet Take 1 tablet (500 mg) by mouth daily. 7 tablet 0    metroNIDAZOLE (FLAGYL) 500 MG tablet Take 1 tablet (500 mg) by mouth 2 times daily for 7 days. 14 tablet 0     No current facility-administered medications for this visit.        Allergies:     Allergies   Allergen Reactions    Morphine Nausea and Vomiting    Penicillins Unknown        Social History     Socioeconomic History    Marital status:      Spouse name: Not on file    Number of children: Not on file    Years of education: Not on file    Highest education level: Not on file   Occupational History     Not on file   Tobacco Use    Smoking status: Never    Smokeless tobacco: Never   Vaping Use    Vaping status: Never Used   Substance and Sexual Activity    Alcohol use: Yes     Comment: Alcoholic Drinks/day: social 2-3 times a week, 3-6    Drug use: No    Sexual activity: Not Currently     Partners: Female   Other Topics Concern    Not on file   Social History Narrative    Not on file     Social Drivers of Health     Financial Resource Strain: Not on file   Food Insecurity: Not on file   Transportation Needs: Not on file   Physical Activity: Not on file   Stress: Not on file   Social Connections: Not on file   Interpersonal Safety: Low Risk  (8/1/2024)    Interpersonal Safety     Do you feel physically and emotionally safe where you currently live?: Yes     Within the past 12 months, have you been hit, slapped, kicked or otherwise physically hurt by someone?: No     Within the past 12 months, have you been humiliated or emotionally abused in other ways by your partner or ex-partner?: No   Housing Stability: Not on file       Family History   Problem Relation Age of Onset    Hyperlipidemia Mother     Hyperlipidemia Father     Cancer No family hx of     Heart Disease No family hx of         Most Recent Immunizations   Administered Date(s) Administered    COVID-19 MONOVALENT 12+ (Pfizer) 12/01/2021    Flu, Unspecified 10/26/2017    Influenza Vaccine, 6+MO IM (QUADRIVALENT W/PRESERVATIVES) 09/06/2019    TDAP (Adacel,Boostrix) 10/26/2017        Wt Readings from Last 3 Encounters:   02/17/25 95.7 kg (211 lb)   08/01/24 90.7 kg (200 lb)   02/23/23 87.2 kg (192 lb 3 oz)        BP Readings from Last 6 Encounters:   02/17/25 124/74   08/01/24 110/80   02/23/23 120/80   08/18/22 108/82   03/10/22 122/84   08/05/21 110/74        Hemoglobin A1C   Date Value Ref Range Status   08/22/2024 4.9 0.0 - 5.6 % Final     Comment:     Normal <5.7%   Prediabetes 5.7-6.4%    Diabetes 6.5% or higher     Note: Adopted from ADA consensus  "guidelines.              PHYSICAL EXAM:    /74   Pulse 55   Temp 98.5  F (36.9  C) (Oral)   Resp 18   Ht 1.854 m (6' 1\")   Wt 95.7 kg (211 lb)   SpO2 93%   BMI 27.84 kg/m       General: Patient noted no signs of distress    HEENT: No conjunctival irritation or scleral icterus no lesions are noted inside the mouth or pharynx.  Does have noted dental fillings but no irritated or swelling in the region of the gums.  No intraoral soft tissue lesions.  Palpation of the area around the angle of the jaw reveals that there is a relative swelling seemingly within the parotid gland on the left-hand side as compared to the right.  Patient does report tenderness in this region as well as tenderness that extends below the mandible into the soft tissue of the neck.  I do not palpate any other discrete masses or soft tissue densities.  Examination of both ears reveals that no signs of infection within the ear canal or inside the middle ear cavity.    Heart and lungs: Good air movement throughout without wheeze or crackle heart regular rate and rhythm without murmur                          "

## 2025-03-05 ENCOUNTER — MYC REFILL (OUTPATIENT)
Dept: FAMILY MEDICINE | Facility: CLINIC | Age: 40
End: 2025-03-05
Payer: COMMERCIAL

## 2025-03-05 DIAGNOSIS — F90.9 ATTENTION DEFICIT HYPERACTIVITY DISORDER (ADHD), UNSPECIFIED ADHD TYPE: ICD-10-CM

## 2025-03-06 RX ORDER — DEXTROAMPHETAMINE SACCHARATE, AMPHETAMINE ASPARTATE MONOHYDRATE, DEXTROAMPHETAMINE SULFATE AND AMPHETAMINE SULFATE 3.75; 3.75; 3.75; 3.75 MG/1; MG/1; MG/1; MG/1
15 CAPSULE, EXTENDED RELEASE ORAL DAILY
Qty: 30 CAPSULE | Refills: 0 | Status: SHIPPED | OUTPATIENT
Start: 2025-03-06

## 2025-03-06 RX ORDER — DEXTROAMPHETAMINE SACCHARATE, AMPHETAMINE ASPARTATE, DEXTROAMPHETAMINE SULFATE AND AMPHETAMINE SULFATE 3.75; 3.75; 3.75; 3.75 MG/1; MG/1; MG/1; MG/1
15 TABLET ORAL DAILY
Qty: 30 TABLET | Refills: 0 | Status: SHIPPED | OUTPATIENT
Start: 2025-03-06

## 2025-03-20 ENCOUNTER — OFFICE VISIT (OUTPATIENT)
Dept: FAMILY MEDICINE | Facility: CLINIC | Age: 40
End: 2025-03-20
Payer: COMMERCIAL

## 2025-03-20 VITALS
HEIGHT: 73 IN | BODY MASS INDEX: 27.57 KG/M2 | WEIGHT: 208 LBS | DIASTOLIC BLOOD PRESSURE: 90 MMHG | HEART RATE: 64 BPM | SYSTOLIC BLOOD PRESSURE: 130 MMHG | OXYGEN SATURATION: 99 % | TEMPERATURE: 97 F | RESPIRATION RATE: 20 BRPM

## 2025-03-20 DIAGNOSIS — F90.9 ATTENTION DEFICIT HYPERACTIVITY DISORDER (ADHD), UNSPECIFIED ADHD TYPE: ICD-10-CM

## 2025-03-20 DIAGNOSIS — R03.0 ELEVATED BP WITHOUT DIAGNOSIS OF HYPERTENSION: ICD-10-CM

## 2025-03-20 DIAGNOSIS — Z00.00 ANNUAL PHYSICAL EXAM: Primary | ICD-10-CM

## 2025-03-20 DIAGNOSIS — F41.9 ANXIETY: ICD-10-CM

## 2025-03-20 LAB
ERYTHROCYTE [DISTWIDTH] IN BLOOD BY AUTOMATED COUNT: 12.1 % (ref 10–15)
HCT VFR BLD AUTO: 44.4 % (ref 40–53)
HGB BLD-MCNC: 15.5 G/DL (ref 13.3–17.7)
MCH RBC QN AUTO: 31.1 PG (ref 26.5–33)
MCHC RBC AUTO-ENTMCNC: 34.9 G/DL (ref 31.5–36.5)
MCV RBC AUTO: 89 FL (ref 78–100)
PLATELET # BLD AUTO: 203 10E3/UL (ref 150–450)
RBC # BLD AUTO: 4.98 10E6/UL (ref 4.4–5.9)
WBC # BLD AUTO: 7.7 10E3/UL (ref 4–11)

## 2025-03-20 RX ORDER — DEXTROAMPHETAMINE SACCHARATE, AMPHETAMINE ASPARTATE, DEXTROAMPHETAMINE SULFATE AND AMPHETAMINE SULFATE 3.75; 3.75; 3.75; 3.75 MG/1; MG/1; MG/1; MG/1
15 TABLET ORAL DAILY
Qty: 30 TABLET | Refills: 0 | Status: SHIPPED | OUTPATIENT
Start: 2025-04-03 | End: 2025-05-03

## 2025-03-20 RX ORDER — DEXTROAMPHETAMINE SACCHARATE, AMPHETAMINE ASPARTATE MONOHYDRATE, DEXTROAMPHETAMINE SULFATE AND AMPHETAMINE SULFATE 3.75; 3.75; 3.75; 3.75 MG/1; MG/1; MG/1; MG/1
15 CAPSULE, EXTENDED RELEASE ORAL DAILY
Qty: 30 CAPSULE | Refills: 0 | Status: SHIPPED | OUTPATIENT
Start: 2025-04-03 | End: 2025-05-03

## 2025-03-20 RX ORDER — DEXTROAMPHETAMINE SACCHARATE, AMPHETAMINE ASPARTATE, DEXTROAMPHETAMINE SULFATE AND AMPHETAMINE SULFATE 3.75; 3.75; 3.75; 3.75 MG/1; MG/1; MG/1; MG/1
15 TABLET ORAL DAILY
Qty: 30 TABLET | Refills: 0 | Status: SHIPPED | OUTPATIENT
Start: 2025-06-02 | End: 2025-07-02

## 2025-03-20 RX ORDER — DEXTROAMPHETAMINE SACCHARATE, AMPHETAMINE ASPARTATE, DEXTROAMPHETAMINE SULFATE AND AMPHETAMINE SULFATE 3.75; 3.75; 3.75; 3.75 MG/1; MG/1; MG/1; MG/1
15 TABLET ORAL DAILY
Qty: 30 TABLET | Refills: 0 | Status: SHIPPED | OUTPATIENT
Start: 2025-05-03 | End: 2025-06-02

## 2025-03-20 RX ORDER — DEXTROAMPHETAMINE SACCHARATE, AMPHETAMINE ASPARTATE MONOHYDRATE, DEXTROAMPHETAMINE SULFATE AND AMPHETAMINE SULFATE 3.75; 3.75; 3.75; 3.75 MG/1; MG/1; MG/1; MG/1
15 CAPSULE, EXTENDED RELEASE ORAL DAILY
Qty: 30 CAPSULE | Refills: 0 | Status: SHIPPED | OUTPATIENT
Start: 2025-05-03 | End: 2025-06-02

## 2025-03-20 RX ORDER — DEXTROAMPHETAMINE SACCHARATE, AMPHETAMINE ASPARTATE MONOHYDRATE, DEXTROAMPHETAMINE SULFATE AND AMPHETAMINE SULFATE 3.75; 3.75; 3.75; 3.75 MG/1; MG/1; MG/1; MG/1
15 CAPSULE, EXTENDED RELEASE ORAL DAILY
Qty: 30 CAPSULE | Refills: 0 | Status: SHIPPED | OUTPATIENT
Start: 2025-06-02 | End: 2025-07-02

## 2025-03-20 ASSESSMENT — PAIN SCALES - GENERAL: PAINLEVEL_OUTOF10: NO PAIN (0)

## 2025-03-20 NOTE — PROGRESS NOTES
Assessment/ Plan   40 year old male with PMH of ADHD and LUIZ who presents for annual physical and ADHD Follow-up.    1. Annual physical exam (Primary)  - Comprehensive metabolic panel (BMP + Alb, Alk Phos, ALT, AST, Total. Bili, TP); Future  - CBC with platelets; Future    2. Anxiety  Depression/LUIZ HX:  7/24:  Wellbutrin 300 mg daily. Feels this helps his anxiety well. No side effects. LUIZ 7 is 0 today.    3/25:  Anxiety controlled on the wellbutrin. No side effects.     Current Treatment:  Wellbutrin 300 mg daily.        2/8/2024    10:31 AM 8/1/2024    12:26 PM 3/19/2025     2:39 PM   PHQ   PHQ-9 Total Score 0 0 1    Q9: Thoughts of better off dead/self-harm past 2 weeks Not at all Not at all Not at all       Patient-reported           8/3/2023     5:07 PM 2/8/2024     4:38 PM   LUIZ-7 SCORE   Total Score  0 (minimal anxiety)   Total Score 0 0       3. Attention deficit hyperactivity disorder (ADHD), unspecified ADHD type  ADD Overview  Hx:  Diagnosed in 2010. Difficulty with focusing at work.  Lots of racing thoughts.  Had an assessment through [facility] in [place]. Has been on adderral. no side effects with adderral. Good dose with 15 mg XR every day and 10 mg short acting as needed. Has kaushal on higher doses but felt more anxiety and crash on these more.    8/23: went back up on short acting to 15 mg daily as he felt less effectiveness on 10 mg    2/24:  Patient feels like his medication is going well. He noticed a subtle difference in the short acting dose from 10 to 15 mg daily. No side effects. He takes the long acting adderral at about 7 am and short acting dose at 2-3 pm in afternoon. Visit scheduled as virtual. He will come in to do urine drug screen, CSA, and HTN check    8/24:  Patient will take long acting dose at 8 am and then take his short acting acting dose are 2-3 pm. He likes this regimen. Feels dosage is right. Has not lost weight. No problems with sleep.     3/25:  Patient feels  carlos flustered today with work. BP is borderline high 90 and 90 diastolic.  Weight is stable.     Weight:  Wt Readings from Last 2 Encounters:   03/20/25 94.3 kg (208 lb)   03/20/25 94.3 kg (208 lb)       BP:  BP Readings from Last 6 Encounters:   03/20/25 130/90   03/20/25 (!) 134/39   02/17/25 124/74   08/01/24 110/80   02/23/23 120/80   08/18/22 108/82     Medication:  Adderral 15 mg XR daily  Adderral 15 daily    Urine Drug Screen:  Urine drug screen: 8/24    CSA 8/24  CSA:  CSA -- Encounter Level:    CSA: None found at the encounter level.       CSA -- Patient Level:     [Media Unavailable] Controlled Substance Agreement - Non - Opioid - Scan on 8/18/2021  3:07 PM: NON-OPIOID CONTROLLED SUBSTANCE AGREEMENT   [Media Unavailable] Controlled Substance Agreement - Non - Opioid - Scan on 10/30/2020   [Media Unavailable] Controlled Substance Agreement - Non - Opioid - Scan on 9/13/2019         - amphetamine-dextroamphetamine (ADDERALL XR) 15 MG 24 hr capsule; Take 1 capsule (15 mg) by mouth daily.  Dispense: 30 capsule; Refill: 0  - amphetamine-dextroamphetamine (ADDERALL XR) 15 MG 24 hr capsule; Take 1 capsule (15 mg) by mouth daily.  Dispense: 30 capsule; Refill: 0  - amphetamine-dextroamphetamine (ADDERALL XR) 15 MG 24 hr capsule; Take 1 capsule (15 mg) by mouth daily.  Dispense: 30 capsule; Refill: 0  - amphetamine-dextroamphetamine (ADDERALL) 15 MG tablet; Take 1 tablet (15 mg) by mouth daily.  Dispense: 30 tablet; Refill: 0  - amphetamine-dextroamphetamine (ADDERALL) 15 MG tablet; Take 1 tablet (15 mg) by mouth daily.  Dispense: 30 tablet; Refill: 0  - amphetamine-dextroamphetamine (ADDERALL) 15 MG tablet; Take 1 tablet (15 mg) by mouth daily.  Dispense: 30 tablet; Refill: 0    4. Elevated BP without diagnosis of hypertension  BP elevated at 130/90 and 134/93.  First time this has happened. Possibly related to weight gain. He will check BP's at home and follow up via evisit in 2 weeks with BP's.    Follow-up  "in:  6 months virtually, 3 months for evisit for med refill discussed today    Anuel Kan MD    The longitudinal plan of care for the diagnosis(es)/condition(s) as documented were addressed during this visit. Due to the added complexity in care, I will continue to support Grady in the subsequent management and with ongoing continuity of care.    Counseling  Appropriate preventive services were discussed with this patient, including applicable screening as appropriate for fall prevention, nutrition, physical activity, Tobacco-use cessation, weight loss and cognition    Subjective:     Grady Koo is a 40 year old male who presents for an annual exam.     Chief Complaint   Patient presents with    Physical       Colonoscopy:  PSA:  No results found for: \"PSA\"    Immunization History   Administered Date(s) Administered    COVID-19 MONOVALENT 12+ (Pfizer) 11/10/2021, 12/01/2021    Flu, Unspecified 10/26/2017    Influenza Vaccine, 6+MO IM (QUADRIVALENT W/PRESERVATIVES) 10/26/2017, 11/20/2018, 09/06/2019    TDAP (Adacel,Boostrix) 10/26/2017     Immunization status: due today, Refuses Immunization.     Current Outpatient Medications   Medication Sig Dispense Refill    [START ON 4/3/2025] amphetamine-dextroamphetamine (ADDERALL XR) 15 MG 24 hr capsule Take 1 capsule (15 mg) by mouth daily. 30 capsule 0    [START ON 5/3/2025] amphetamine-dextroamphetamine (ADDERALL XR) 15 MG 24 hr capsule Take 1 capsule (15 mg) by mouth daily. 30 capsule 0    [START ON 6/2/2025] amphetamine-dextroamphetamine (ADDERALL XR) 15 MG 24 hr capsule Take 1 capsule (15 mg) by mouth daily. 30 capsule 0    amphetamine-dextroamphetamine (ADDERALL XR) 15 MG 24 hr capsule Take 1 capsule (15 mg) by mouth daily. 30 capsule 0    [START ON 4/3/2025] amphetamine-dextroamphetamine (ADDERALL) 15 MG tablet Take 1 tablet (15 mg) by mouth daily. 30 tablet 0    [START ON 5/3/2025] amphetamine-dextroamphetamine (ADDERALL) 15 MG tablet Take 1 tablet (15 mg) by " mouth daily. 30 tablet 0    [START ON 6/2/2025] amphetamine-dextroamphetamine (ADDERALL) 15 MG tablet Take 1 tablet (15 mg) by mouth daily. 30 tablet 0    amphetamine-dextroamphetamine (ADDERALL) 15 MG tablet Take 1 tablet (15 mg) by mouth daily. 30 tablet 0    buPROPion (WELLBUTRIN XL) 150 MG 24 hr tablet Take 2 tablets (300 mg) by mouth daily 180 tablet 3     Past Medical History:   Diagnosis Date    ADHD (attention deficit hyperactivity disorder) 9/23/2016    Anxiety 9/23/2016     Past Surgical History:   Procedure Laterality Date    CYST REMOVAL Left     behind left eye    REPAIR TENDON BICEPS DISTAL UPPER EXTREMITY Right      Morphine and Penicillins  Family History   Problem Relation Age of Onset    Hyperlipidemia Mother     Hyperlipidemia Father     Cancer No family hx of     Heart Disease No family hx of      Social History     Socioeconomic History    Marital status:      Spouse name: Not on file    Number of children: Not on file    Years of education: Not on file    Highest education level: Not on file   Occupational History    Not on file   Tobacco Use    Smoking status: Never    Smokeless tobacco: Never   Vaping Use    Vaping status: Never Used   Substance and Sexual Activity    Alcohol use: Yes     Comment: Alcoholic Drinks/day: social 2-3 times a week, 3-6    Drug use: No    Sexual activity: Not Currently     Partners: Female   Other Topics Concern    Not on file   Social History Narrative    Not on file     Social Drivers of Health     Financial Resource Strain: Not on file   Food Insecurity: Not on file   Transportation Needs: Not on file   Physical Activity: Not on file   Stress: Not on file   Social Connections: Not on file   Interpersonal Safety: Low Risk  (3/20/2025)    Interpersonal Safety     Do you feel physically and emotionally safe where you currently live?: Yes     Within the past 12 months, have you been hit, slapped, kicked or otherwise physically hurt by someone?: No     Within  "the past 12 months, have you been humiliated or emotionally abused in other ways by your partner or ex-partner?: No   Housing Stability: Not on file       Health Care Directive  Discussed advance care planning with patient; information given to patient to review.    Review of Systems  Complete ROS negative except as noted in the HPI    Objective:      Vitals:    03/20/25 1202 03/20/25 1203   BP: (!) 134/93 130/90   Pulse: 64    Resp: 20    Temp: 97  F (36.1  C)    SpO2: 99%    Weight: 94.3 kg (208 lb)    Height: 1.853 m (6' 0.96\")        General appearance: Alert, cooperative, no distress, appears stated age  Head: Normocephalic, atraumatic, without obvious abnormality  EARS: TM's gray dull with structures seen bilaterally  Eyes: Pupils equal round, reactive.  Conjunctiva clear.  Nose: Nares normal, no drainage.  Throat: Lips, mucosa, tongue normal mucosa pink and moist  Neck: Supple, symmetric, trachea midline, no adenopathy.  No thyroid enlargement, tenderness or nodules.    Lungs: Clear to auscultation bilaterally, no wheezing or crackles present.  Respirations unlabored  Heart: Regular rate and rhythm, normal S1 and S2, no murmur, rub or gallop.  Abdomen: Soft, nontender, nondistended.  No masses or organomegaly.  Extremities: Extremities normal, atraumatic.  No cyanosis or edema.  Skin: Skin color, texture, turgor normal no rashes or lesions on limited skin exam  Neurologic: CN II through XII intact, normal strength.      Anuel Wang MD    "

## 2025-03-31 ENCOUNTER — E-VISIT (OUTPATIENT)
Dept: FAMILY MEDICINE | Facility: CLINIC | Age: 40
End: 2025-03-31
Payer: COMMERCIAL

## 2025-03-31 DIAGNOSIS — R03.0 ELEVATED BP WITHOUT DIAGNOSIS OF HYPERTENSION: Primary | ICD-10-CM

## 2025-05-08 ENCOUNTER — MYC MEDICAL ADVICE (OUTPATIENT)
Dept: FAMILY MEDICINE | Facility: CLINIC | Age: 40
End: 2025-05-08
Payer: COMMERCIAL

## 2025-05-08 NOTE — TELEPHONE ENCOUNTER
Please call patient and pharmacy and see the issue.  Should be fine to be able to fill both prescriptions.      Anuel Kan MD

## 2025-05-08 NOTE — TELEPHONE ENCOUNTER
"Called [facility] pharmacy and spoke with Marsha pharmacy tech. Per pharmacy staff, Adderall 15mg tablets are already filled and ready for . Adderall Capsule has a note that states \"do not fill this medication until the 29th of this month\". Informed pharmacy staff that there are two separate prescriptions for the adderall capsule, one of which was placed on 5/3/25, and one of which was placed for the future, to start 6/2. Pharmacy tech states they missed that there were two prescriptions. Tech was able to fill 5/3/25 prescription for Adderall capsule prescription during call, and will have ready for  in 20 minutes.    Called and spoke with patient. Relayed pharmacy error, and information that adderall tablet and adderall capsules will both be ready for  in about 20 minutes. Patient endorses understanding. Denies further questions or concerns at this time.    Karon Torrez RN  [facility]    "

## 2025-06-09 ENCOUNTER — MYC MEDICAL ADVICE (OUTPATIENT)
Dept: FAMILY MEDICINE | Facility: CLINIC | Age: 40
End: 2025-06-09
Payer: COMMERCIAL

## 2025-06-09 DIAGNOSIS — F90.9 ATTENTION DEFICIT HYPERACTIVITY DISORDER (ADHD), UNSPECIFIED ADHD TYPE: Primary | ICD-10-CM

## 2025-06-09 RX ORDER — DEXTROAMPHETAMINE SACCHARATE, AMPHETAMINE ASPARTATE, DEXTROAMPHETAMINE SULFATE AND AMPHETAMINE SULFATE 3.75; 3.75; 3.75; 3.75 MG/1; MG/1; MG/1; MG/1
15 TABLET ORAL DAILY
Qty: 30 TABLET | Refills: 0 | Status: SHIPPED | OUTPATIENT
Start: 2025-06-09

## 2025-06-09 RX ORDER — DEXTROAMPHETAMINE SACCHARATE, AMPHETAMINE ASPARTATE MONOHYDRATE, DEXTROAMPHETAMINE SULFATE AND AMPHETAMINE SULFATE 3.75; 3.75; 3.75; 3.75 MG/1; MG/1; MG/1; MG/1
15 CAPSULE, EXTENDED RELEASE ORAL DAILY
Qty: 30 CAPSULE | Refills: 0 | Status: SHIPPED | OUTPATIENT
Start: 2025-06-09

## 2025-06-19 DIAGNOSIS — F41.9 ANXIETY: ICD-10-CM

## 2025-06-19 RX ORDER — BUPROPION HYDROCHLORIDE 150 MG/1
TABLET ORAL
Qty: 180 TABLET | Refills: 3 | Status: SHIPPED | OUTPATIENT
Start: 2025-06-19

## 2025-07-07 ENCOUNTER — MYC REFILL (OUTPATIENT)
Dept: FAMILY MEDICINE | Facility: CLINIC | Age: 40
End: 2025-07-07
Payer: COMMERCIAL

## 2025-07-07 DIAGNOSIS — F90.9 ATTENTION DEFICIT HYPERACTIVITY DISORDER (ADHD), UNSPECIFIED ADHD TYPE: ICD-10-CM

## 2025-07-07 RX ORDER — DEXTROAMPHETAMINE SACCHARATE, AMPHETAMINE ASPARTATE MONOHYDRATE, DEXTROAMPHETAMINE SULFATE AND AMPHETAMINE SULFATE 3.75; 3.75; 3.75; 3.75 MG/1; MG/1; MG/1; MG/1
15 CAPSULE, EXTENDED RELEASE ORAL DAILY
Qty: 30 CAPSULE | Refills: 0 | Status: SHIPPED | OUTPATIENT
Start: 2025-07-07

## 2025-07-07 RX ORDER — DEXTROAMPHETAMINE SACCHARATE, AMPHETAMINE ASPARTATE, DEXTROAMPHETAMINE SULFATE AND AMPHETAMINE SULFATE 3.75; 3.75; 3.75; 3.75 MG/1; MG/1; MG/1; MG/1
15 TABLET ORAL DAILY
Qty: 30 TABLET | Refills: 0 | Status: SHIPPED | OUTPATIENT
Start: 2025-07-07

## 2025-08-05 ENCOUNTER — MYC REFILL (OUTPATIENT)
Dept: FAMILY MEDICINE | Facility: CLINIC | Age: 40
End: 2025-08-05
Payer: COMMERCIAL

## 2025-08-05 DIAGNOSIS — F90.9 ATTENTION DEFICIT HYPERACTIVITY DISORDER (ADHD), UNSPECIFIED ADHD TYPE: ICD-10-CM

## 2025-08-05 RX ORDER — DEXTROAMPHETAMINE SACCHARATE, AMPHETAMINE ASPARTATE MONOHYDRATE, DEXTROAMPHETAMINE SULFATE AND AMPHETAMINE SULFATE 3.75; 3.75; 3.75; 3.75 MG/1; MG/1; MG/1; MG/1
15 CAPSULE, EXTENDED RELEASE ORAL DAILY
Qty: 30 CAPSULE | Refills: 0 | Status: SHIPPED | OUTPATIENT
Start: 2025-08-05

## 2025-08-05 RX ORDER — DEXTROAMPHETAMINE SACCHARATE, AMPHETAMINE ASPARTATE, DEXTROAMPHETAMINE SULFATE AND AMPHETAMINE SULFATE 3.75; 3.75; 3.75; 3.75 MG/1; MG/1; MG/1; MG/1
15 TABLET ORAL DAILY
Qty: 30 TABLET | Refills: 0 | Status: SHIPPED | OUTPATIENT
Start: 2025-08-05